# Patient Record
Sex: FEMALE | Race: WHITE | NOT HISPANIC OR LATINO | Employment: OTHER | URBAN - METROPOLITAN AREA
[De-identification: names, ages, dates, MRNs, and addresses within clinical notes are randomized per-mention and may not be internally consistent; named-entity substitution may affect disease eponyms.]

---

## 2017-04-05 ENCOUNTER — OFFICE VISIT (OUTPATIENT)
Dept: ORTHOPEDICS | Facility: CLINIC | Age: 57
End: 2017-04-05
Payer: COMMERCIAL

## 2017-04-05 VITALS
SYSTOLIC BLOOD PRESSURE: 140 MMHG | HEIGHT: 66 IN | DIASTOLIC BLOOD PRESSURE: 72 MMHG | HEART RATE: 84 BPM | WEIGHT: 180 LBS | BODY MASS INDEX: 28.93 KG/M2

## 2017-04-05 DIAGNOSIS — G89.29 CHRONIC PAIN OF LEFT KNEE: Primary | ICD-10-CM

## 2017-04-05 DIAGNOSIS — M25.562 LEFT KNEE PAIN, UNSPECIFIED CHRONICITY: Primary | ICD-10-CM

## 2017-04-05 DIAGNOSIS — M25.562 CHRONIC PAIN OF LEFT KNEE: Primary | ICD-10-CM

## 2017-04-05 PROCEDURE — 20610 DRAIN/INJ JOINT/BURSA W/O US: CPT | Mod: LT,S$GLB,, | Performed by: ORTHOPAEDIC SURGERY

## 2017-04-05 PROCEDURE — 1160F RVW MEDS BY RX/DR IN RCRD: CPT | Mod: S$GLB,,, | Performed by: ORTHOPAEDIC SURGERY

## 2017-04-05 PROCEDURE — 99213 OFFICE O/P EST LOW 20 MIN: CPT | Mod: 25,S$GLB,, | Performed by: ORTHOPAEDIC SURGERY

## 2017-04-05 RX ORDER — PRAVASTATIN SODIUM 40 MG/1
40 TABLET ORAL DAILY
COMMUNITY
End: 2018-04-09

## 2017-04-05 RX ORDER — TRIAMCINOLONE ACETONIDE 40 MG/ML
40 INJECTION, SUSPENSION INTRA-ARTICULAR; INTRAMUSCULAR
Status: DISCONTINUED | OUTPATIENT
Start: 2017-04-05 | End: 2017-04-05 | Stop reason: HOSPADM

## 2017-04-05 RX ADMIN — TRIAMCINOLONE ACETONIDE 40 MG: 40 INJECTION, SUSPENSION INTRA-ARTICULAR; INTRAMUSCULAR at 02:04

## 2017-04-05 NOTE — PROGRESS NOTES
Past Medical History:   Diagnosis Date    Arthritis     Hypertension     Thyroid disease        Past Surgical History:   Procedure Laterality Date    ANKLE SURGERY  2000    APPENDECTOMY  2015    HAND SURGERY  2012    finger     KNEE ARTHROSCOPY  2011    TONSILLECTOMY  1965       Current Outpatient Prescriptions   Medication Sig    levothyroxine (SYNTHROID) 150 MCG tablet Take 150 mcg by mouth once daily.    metoprolol succinate (TOPROL-XL) 100 MG 24 hr tablet Take 100 mg by mouth once daily.    pravastatin (PRAVACHOL) 40 MG tablet Take 40 mg by mouth once daily.    CALCIUM CARBONATE/VITAMIN D3 (VITAMIN D-3 ORAL) Take by mouth.    CITALOPRAM HYDROBROMIDE (CELEXA ORAL) Take by mouth.    DOCOSAHEXANOIC ACID/EPA (FISH OIL ORAL) Take by mouth.    FOLIC ACID ORAL Take by mouth.    PHYTONADIONE (VITAMIN K ORAL) Take by mouth.    rosuvastatin (CRESTOR) 40 MG Tab Take 10 mg by mouth every evening.    VITAMIN A ORAL Take by mouth.     No current facility-administered medications for this visit.        Review of patient's allergies indicates:  No Known Allergies    History reviewed. No pertinent family history.    Social History     Social History    Marital status:      Spouse name: N/A    Number of children: N/A    Years of education: N/A     Occupational History    Not on file.     Social History Main Topics    Smoking status: Former Smoker    Smokeless tobacco: Not on file    Alcohol use Not on file    Drug use: Not on file    Sexual activity: Not on file     Other Topics Concern    Not on file     Social History Narrative       Chief Complaint:   Chief Complaint   Patient presents with    Left Knee - Pain       Consulting Physician: No ref. provider found    History of present illness: This is a 55-year-old lady who has had knee pain on and off for the past year.  She puts her pain at a 8 out of 10.  She was scheduled for surgery but did not have  She reports popping clicking and  swelling.  She localizes her pain to the lateral aspect of the knee.  She states that is worse with activities.      Review of Systems:    Constitution: Denies chills, fever, and sweats.    HENT: Denies headaches or blurry vision.    Cardiovascular: Denies chest pain or irregular heart beat.    Respiratory: Denies cough or shortness of breath.    Gastrointestinal: Denies abdominal pain, nausea, or vomiting.    Musculoskeletal:  Denies muscle cramps.    Neurological: Denies dizziness or focal weakness.    Psychiatric/Behavioral: Normal mental status.    Hematologic/Lymphatic: Denies bleeding problem or easy bruising/bleeding.    Skin: Denies rash or suspicious lesions.      Examination:    Vital Signs:    Vitals:    04/05/17 1106   BP: (!) 140/72   Pulse: 84       Body mass index is 29.5 kg/(m^2).    This a well-developed, well nourished patient in no acute distress.    Alert and oriented and cooperative to examination.       Physical Exam: Left Knee Exam    Gait   Normal    Skin  Rash:   None  Scars:   None    Inspection  Erythema:  None  Ecchymosis:  None  Effusion:  None  Masses:  None  Lymphadenopathy: None    Range of Motion: 0 to 130° with pain    Medial Joint : Yes  Lateral Joint : Yes    Patellofemoral Tenderness: None  Patellofemoral Crepitus: None    Lachman:  Normal  Anterior Drawer: Normal  Posterior Drawer: Normal    Meghan's:  Positive  Apley's:  Positive    Varus Stress:  Stable  Valgus Stress:     Stable    Strength:  5/5    Pulses:  Palpable distal    Sensation:  Intact      Imaging: MRI shows a horizontal tear of the lateral meniscus.  It also shows an osteochondral defect underneath the anterior medial horn of the meniscus.  It appears to be a fluid-filled cyst in this area.      Assessment: Chronic pain of left knee  -     Large Joint Aspiration/Injection        Plan:  We discussed different treatment options.  She is painful in the area where she has her cyst.  She would  like to undergo a scope to do a partial lateral meniscectomy and a sub-chondroplasty to address the cyst under the cartilage on the anterior medial side.  She wants an injection today and will do surgery after her vacation.        DISCLAIMER: This note may have been dictated using voice recognition software and may contain grammatical errors.     NOTE: Consult report sent to referring provider via [employer]MR.

## 2017-04-05 NOTE — PROCEDURES
Large Joint Aspiration/Injection  Date/Time: 4/5/2017 2:30 PM  Performed by: URIEL CHRISTIE  Authorized by: URIEL CHRISTIE     Consent Done?:  Yes (Verbal)  Indications:  Pain  Timeout: Prior to procedure the correct patient, procedure, and site was verified      Location:  Knee  Site:  L knee  Prep: Patient was prepped and draped in usual sterile fashion    Approach:  Anteromedial  Medications:  40 mg triamcinolone acetonide 40 mg/mL

## 2017-05-03 ENCOUNTER — OFFICE VISIT (OUTPATIENT)
Dept: ORTHOPEDICS | Facility: CLINIC | Age: 57
End: 2017-05-03
Payer: COMMERCIAL

## 2017-05-03 VITALS
BODY MASS INDEX: 28.93 KG/M2 | HEIGHT: 66 IN | DIASTOLIC BLOOD PRESSURE: 65 MMHG | HEART RATE: 75 BPM | WEIGHT: 180 LBS | SYSTOLIC BLOOD PRESSURE: 121 MMHG

## 2017-05-03 DIAGNOSIS — S83.207D ACUTE MENISCAL TEAR OF KNEE, LEFT, SUBSEQUENT ENCOUNTER: Primary | ICD-10-CM

## 2017-05-03 PROCEDURE — 20610 DRAIN/INJ JOINT/BURSA W/O US: CPT | Mod: LT,S$GLB,, | Performed by: ORTHOPAEDIC SURGERY

## 2017-05-03 PROCEDURE — 99213 OFFICE O/P EST LOW 20 MIN: CPT | Mod: 25,S$GLB,, | Performed by: ORTHOPAEDIC SURGERY

## 2017-05-03 PROCEDURE — 1160F RVW MEDS BY RX/DR IN RCRD: CPT | Mod: S$GLB,,, | Performed by: ORTHOPAEDIC SURGERY

## 2017-05-03 RX ORDER — TRIAMCINOLONE ACETONIDE 40 MG/ML
40 INJECTION, SUSPENSION INTRA-ARTICULAR; INTRAMUSCULAR
Status: DISCONTINUED | OUTPATIENT
Start: 2017-05-03 | End: 2017-05-03 | Stop reason: HOSPADM

## 2017-05-03 RX ADMIN — TRIAMCINOLONE ACETONIDE 40 MG: 40 INJECTION, SUSPENSION INTRA-ARTICULAR; INTRAMUSCULAR at 01:05

## 2017-05-03 NOTE — PROGRESS NOTES
Past Medical History:   Diagnosis Date    Arthritis     Hypertension     Thyroid disease        Past Surgical History:   Procedure Laterality Date    ANKLE SURGERY  2000    APPENDECTOMY  2015    HAND SURGERY  2012    finger     KNEE ARTHROSCOPY  2011    TONSILLECTOMY  1965       Current Outpatient Prescriptions   Medication Sig    CALCIUM CARBONATE/VITAMIN D3 (VITAMIN D-3 ORAL) Take by mouth.    CITALOPRAM HYDROBROMIDE (CELEXA ORAL) Take by mouth.    DOCOSAHEXANOIC ACID/EPA (FISH OIL ORAL) Take by mouth.    FOLIC ACID ORAL Take by mouth.    levothyroxine (SYNTHROID) 150 MCG tablet Take 150 mcg by mouth once daily.    metoprolol succinate (TOPROL-XL) 100 MG 24 hr tablet Take 100 mg by mouth once daily.    PHYTONADIONE (VITAMIN K ORAL) Take by mouth.    pravastatin (PRAVACHOL) 40 MG tablet Take 40 mg by mouth once daily.    rosuvastatin (CRESTOR) 40 MG Tab Take 10 mg by mouth every evening.    VITAMIN A ORAL Take by mouth.     No current facility-administered medications for this visit.        Review of patient's allergies indicates:  No Known Allergies    History reviewed. No pertinent family history.    Social History     Social History    Marital status:      Spouse name: N/A    Number of children: N/A    Years of education: N/A     Occupational History    Not on file.     Social History Main Topics    Smoking status: Former Smoker    Smokeless tobacco: Not on file    Alcohol use Not on file    Drug use: Not on file    Sexual activity: Not on file     Other Topics Concern    Not on file     Social History Narrative       Chief Complaint:   Chief Complaint   Patient presents with    Left Knee - Pain       Consulting Physician: No ref. provider found    History of present illness: This is a 55-year-old lady who has had knee pain on and off for the past year.  She puts her pain at a 8 out of 10.  She was scheduled for surgery but did not have  She reports popping clicking and  swelling.  She localizes her pain to the lateral aspect of the knee.  She states that is worse with activities.      Review of Systems:    Constitution: Denies chills, fever, and sweats.    HENT: Denies headaches or blurry vision.    Cardiovascular: Denies chest pain or irregular heart beat.    Respiratory: Denies cough or shortness of breath.    Gastrointestinal: Denies abdominal pain, nausea, or vomiting.    Musculoskeletal:  Denies muscle cramps.    Neurological: Denies dizziness or focal weakness.    Psychiatric/Behavioral: Normal mental status.    Hematologic/Lymphatic: Denies bleeding problem or easy bruising/bleeding.    Skin: Denies rash or suspicious lesions.      Examination:    Vital Signs:    Vitals:    05/03/17 1102   BP: 121/65   Pulse: 75       Body mass index is 29.5 kg/(m^2).    This a well-developed, well nourished patient in no acute distress.    Alert and oriented and cooperative to examination.       Physical Exam: Left Knee Exam    Gait   Normal    Skin  Rash:   None  Scars:   None    Inspection  Erythema:  None  Ecchymosis:  None  Effusion:  None  Masses:  None  Lymphadenopathy: None    Range of Motion: 0 to 130° with pain    Medial Joint : Yes  Lateral Joint : Yes    Patellofemoral Tenderness: None  Patellofemoral Crepitus: None    Lachman:  Normal  Anterior Drawer: Normal  Posterior Drawer: Normal    Meghan's:  Positive  Apley's:  Positive    Varus Stress:  Stable  Valgus Stress:     Stable    Strength:  5/5    Pulses:  Palpable distal    Sensation:  Intact      Imaging: MRI shows a horizontal tear of the lateral meniscus.  It also shows an osteochondral defect underneath the anterior medial horn of the meniscus.  It appears to be a fluid-filled cyst in this area.      Assessment: Acute meniscal tear of knee, left, subsequent encounter  -     Large Joint Aspiration/Injection        Plan:  She wants an injection today and will do surgery after her  vacation.        DISCLAIMER: This note may have been dictated using voice recognition software and may contain grammatical errors.     NOTE: Consult report sent to referring provider via [employer] EMR.

## 2017-05-03 NOTE — PROCEDURES
Large Joint Aspiration/Injection  Date/Time: 5/3/2017 1:43 PM  Performed by: URIEL CHRISTIE  Authorized by: URIEL CHRISTIE     Consent Done?:  Yes (Verbal)  Indications:  Pain  Timeout: Prior to procedure the correct patient, procedure, and site was verified      Location:  Knee  Site:  L knee  Prep: Patient was prepped and draped in usual sterile fashion    Approach:  Anterolateral  Medications:  40 mg triamcinolone acetonide 40 mg/mL

## 2018-04-09 DIAGNOSIS — E78.5 HYPERLIPIDEMIA, UNSPECIFIED HYPERLIPIDEMIA TYPE: Primary | ICD-10-CM

## 2018-04-09 DIAGNOSIS — E03.9 HYPOTHYROIDISM, UNSPECIFIED TYPE: ICD-10-CM

## 2018-04-09 DIAGNOSIS — R00.0 TACHYCARDIA: ICD-10-CM

## 2018-04-09 RX ORDER — SIMVASTATIN 40 MG/1
40 TABLET, FILM COATED ORAL NIGHTLY
Qty: 90 TABLET | Refills: 3 | Status: SHIPPED | OUTPATIENT
Start: 2018-04-09 | End: 2018-08-06 | Stop reason: ALTCHOICE

## 2018-04-09 RX ORDER — LEVOTHYROXINE SODIUM 150 UG/1
150 TABLET ORAL DAILY
Qty: 90 TABLET | Refills: 3 | Status: SHIPPED | OUTPATIENT
Start: 2018-04-09 | End: 2018-05-08

## 2018-04-09 RX ORDER — METOPROLOL SUCCINATE 100 MG/1
100 TABLET, EXTENDED RELEASE ORAL DAILY
Qty: 90 TABLET | Refills: 3 | Status: SHIPPED | OUTPATIENT
Start: 2018-04-09 | End: 2018-05-08 | Stop reason: SDUPTHER

## 2018-05-08 DIAGNOSIS — R00.0 TACHYCARDIA: ICD-10-CM

## 2018-05-08 RX ORDER — METOPROLOL SUCCINATE 100 MG/1
100 TABLET, EXTENDED RELEASE ORAL DAILY
Qty: 90 TABLET | Refills: 3 | Status: SHIPPED | OUTPATIENT
Start: 2018-05-08 | End: 2019-07-15 | Stop reason: SDUPTHER

## 2018-05-08 RX ORDER — LEVOTHYROXINE SODIUM 137 UG/1
137 TABLET ORAL
Qty: 30 TABLET | Refills: 11 | Status: SHIPPED | OUTPATIENT
Start: 2018-05-08 | End: 2018-11-06 | Stop reason: DRUGHIGH

## 2018-06-15 DIAGNOSIS — I10 HYPERTENSION, UNSPECIFIED TYPE: Primary | ICD-10-CM

## 2018-06-15 RX ORDER — LOSARTAN POTASSIUM 50 MG/1
50 TABLET ORAL DAILY
Qty: 90 TABLET | Refills: 3 | Status: SHIPPED | OUTPATIENT
Start: 2018-06-15 | End: 2018-08-06

## 2018-07-16 ENCOUNTER — OFFICE VISIT (OUTPATIENT)
Dept: FAMILY MEDICINE | Facility: CLINIC | Age: 58
End: 2018-07-16
Payer: COMMERCIAL

## 2018-07-16 ENCOUNTER — HOSPITAL ENCOUNTER (EMERGENCY)
Facility: HOSPITAL | Age: 58
Discharge: HOME | End: 2018-07-16
Attending: EMERGENCY MEDICINE
Payer: COMMERCIAL

## 2018-07-16 VITALS
HEIGHT: 65 IN | HEART RATE: 86 BPM | SYSTOLIC BLOOD PRESSURE: 156 MMHG | RESPIRATION RATE: 18 BRPM | DIASTOLIC BLOOD PRESSURE: 100 MMHG | BODY MASS INDEX: 32.99 KG/M2 | OXYGEN SATURATION: 98 % | TEMPERATURE: 98 F | WEIGHT: 198 LBS

## 2018-07-16 VITALS
SYSTOLIC BLOOD PRESSURE: 180 MMHG | HEIGHT: 65 IN | BODY MASS INDEX: 32.99 KG/M2 | WEIGHT: 198 LBS | TEMPERATURE: 98 F | DIASTOLIC BLOOD PRESSURE: 98 MMHG | OXYGEN SATURATION: 100 % | HEART RATE: 100 BPM

## 2018-07-16 DIAGNOSIS — I10 ESSENTIAL HYPERTENSION: Primary | ICD-10-CM

## 2018-07-16 DIAGNOSIS — T78.40XA ALLERGIC REACTION, INITIAL ENCOUNTER: Primary | ICD-10-CM

## 2018-07-16 DIAGNOSIS — I10 ESSENTIAL HYPERTENSION: ICD-10-CM

## 2018-07-16 LAB
ALBUMIN SERPL BCP-MCNC: 4.6 G/DL
ALP SERPL-CCNC: 84 U/L
ALT SERPL W/O P-5'-P-CCNC: 23 U/L
ANION GAP SERPL CALC-SCNC: 12 MMOL/L
AST SERPL-CCNC: 20 U/L
BASOPHILS # BLD AUTO: 0.04 K/UL
BASOPHILS NFR BLD: 0.4 %
BILIRUB SERPL-MCNC: 0.4 MG/DL
BUN SERPL-MCNC: 29 MG/DL
CALCIUM SERPL-MCNC: 9.8 MG/DL
CHLORIDE SERPL-SCNC: 100 MMOL/L
CO2 SERPL-SCNC: 23 MMOL/L
CREAT SERPL-MCNC: 0.9 MG/DL
CRP SERPL-MCNC: 0.19 MG/DL
DIFFERENTIAL METHOD: ABNORMAL
EOSINOPHIL # BLD AUTO: 0.3 K/UL
EOSINOPHIL NFR BLD: 2.8 %
ERYTHROCYTE [DISTWIDTH] IN BLOOD BY AUTOMATED COUNT: 13.3 %
EST. GFR  (AFRICAN AMERICAN): >60 ML/MIN/1.73 M^2
EST. GFR  (NON AFRICAN AMERICAN): >60 ML/MIN/1.73 M^2
GLUCOSE SERPL-MCNC: 94 MG/DL
HCT VFR BLD AUTO: 40.6 %
HGB BLD-MCNC: 13.6 G/DL
IMM GRANULOCYTES # BLD AUTO: 0.06 K/UL
IMM GRANULOCYTES NFR BLD AUTO: 0.6 %
LYMPHOCYTES # BLD AUTO: 2.7 K/UL
LYMPHOCYTES NFR BLD: 29.1 %
MCH RBC QN AUTO: 30.4 PG
MCHC RBC AUTO-ENTMCNC: 33.5 G/DL
MCV RBC AUTO: 91 FL
MONOCYTES # BLD AUTO: 0.8 K/UL
MONOCYTES NFR BLD: 8.8 %
NEUTROPHILS # BLD AUTO: 5.5 K/UL
NEUTROPHILS NFR BLD: 58.3 %
NRBC BLD-RTO: 0 /100 WBC
PLATELET # BLD AUTO: 241 K/UL
PMV BLD AUTO: 9.7 FL
POTASSIUM SERPL-SCNC: 3.9 MMOL/L
PROT SERPL-MCNC: 7.7 G/DL
RBC # BLD AUTO: 4.47 M/UL
SODIUM SERPL-SCNC: 135 MMOL/L
WBC # BLD AUTO: 9.4 K/UL

## 2018-07-16 PROCEDURE — 3008F BODY MASS INDEX DOCD: CPT | Mod: S$GLB,ICN,, | Performed by: NURSE PRACTITIONER

## 2018-07-16 PROCEDURE — 86140 C-REACTIVE PROTEIN: CPT

## 2018-07-16 PROCEDURE — 80053 COMPREHEN METABOLIC PANEL: CPT

## 2018-07-16 PROCEDURE — 99213 OFFICE O/P EST LOW 20 MIN: CPT | Mod: S$GLB,ICN,, | Performed by: NURSE PRACTITIONER

## 2018-07-16 PROCEDURE — 85025 COMPLETE CBC W/AUTO DIFF WBC: CPT

## 2018-07-16 PROCEDURE — 25000003 PHARM REV CODE 250: Performed by: EMERGENCY MEDICINE

## 2018-07-16 PROCEDURE — 99283 EMERGENCY DEPT VISIT LOW MDM: CPT | Mod: 25

## 2018-07-16 PROCEDURE — 96374 THER/PROPH/DIAG INJ IV PUSH: CPT

## 2018-07-16 PROCEDURE — 3077F SYST BP >= 140 MM HG: CPT | Mod: S$GLB,ICN,, | Performed by: NURSE PRACTITIONER

## 2018-07-16 PROCEDURE — 63600175 PHARM REV CODE 636 W HCPCS: Performed by: EMERGENCY MEDICINE

## 2018-07-16 PROCEDURE — 3080F DIAST BP >= 90 MM HG: CPT | Mod: S$GLB,ICN,, | Performed by: NURSE PRACTITIONER

## 2018-07-16 RX ORDER — PREDNISONE 10 MG/1
30 TABLET ORAL DAILY
Qty: 9 TABLET | Refills: 0 | Status: SHIPPED | OUTPATIENT
Start: 2018-07-16 | End: 2018-07-19

## 2018-07-16 RX ORDER — DEXAMETHASONE SODIUM PHOSPHATE 10 MG/ML
INJECTION INTRAMUSCULAR; INTRAVENOUS
Status: DISCONTINUED
Start: 2018-07-16 | End: 2018-07-16 | Stop reason: HOSPADM

## 2018-07-16 RX ORDER — CLONIDINE HYDROCHLORIDE 0.1 MG/1
0.1 TABLET ORAL
Status: COMPLETED | OUTPATIENT
Start: 2018-07-16 | End: 2018-07-16

## 2018-07-16 RX ORDER — CLONIDINE HYDROCHLORIDE 0.1 MG/1
0.1 TABLET ORAL EVERY 8 HOURS PRN
Qty: 30 TABLET | Refills: 0 | Status: SHIPPED | OUTPATIENT
Start: 2018-07-16 | End: 2019-05-24

## 2018-07-16 RX ORDER — DEXAMETHASONE SODIUM PHOSPHATE 100 MG/10ML
10 INJECTION INTRAMUSCULAR; INTRAVENOUS
Status: COMPLETED | OUTPATIENT
Start: 2018-07-16 | End: 2018-07-16

## 2018-07-16 RX ADMIN — DEXAMETHASONE SODIUM PHOSPHATE 10 MG: 10 INJECTION, SOLUTION INTRAMUSCULAR; INTRAVENOUS at 11:07

## 2018-07-16 RX ADMIN — CLONIDINE HYDROCHLORIDE 0.1 MG: 0.1 TABLET ORAL at 12:07

## 2018-07-16 NOTE — DISCHARGE INSTRUCTIONS
Prednisone 3 d    Clonidine as needed    OTC Benadryl    ER if worsens     K Sharmila BRAR    [phone]

## 2018-07-18 NOTE — ED PROVIDER NOTES
Encounter Date: 7/16/2018       History     Chief Complaint   Patient presents with    Allergic Reaction     Started Thursday, was given a steroid injection by Dr. Mckenzie Haynes     58 y F with complaints of facial rash with redness and mild edema.  Symptoms again and passed a she underwent a steroid injection for treatment of symptoms and had mild improvement and then her re-emergence of the rash.    No known inflammatory state  No new medication  No known allergies to food or medications          Review of patient's allergies indicates:  No Known Allergies  Past Medical History:   Diagnosis Date    Arthritis     Hypertension     Thyroid disease      Past Surgical History:   Procedure Laterality Date    ANKLE SURGERY  2000    APPENDECTOMY  2015    HAND SURGERY  2012    finger     HYSTERECTOMY      KNEE ARTHROSCOPY  2011    TONSILLECTOMY  1965     History reviewed. No pertinent family history.  Social History   Substance Use Topics    Smoking status: Former Smoker    Smokeless tobacco: Not on file    Alcohol use Not on file     Review of Systems   Constitutional: Negative.    HENT: Positive for facial swelling. Negative for drooling and trouble swallowing.    Eyes: Negative for photophobia, pain, discharge, redness, itching and visual disturbance.   Respiratory: Negative for cough and shortness of breath.    Cardiovascular: Negative for chest pain.   Musculoskeletal: Negative.    Skin: Positive for rash.   Hematological: Negative.    All other systems reviewed and are negative.      Physical Exam     Initial Vitals [07/16/18 1031]   BP Pulse Resp Temp SpO2   (!) 160/100 79 20 97.9 °F (36.6 °C) 98 %      MAP       --         Physical Exam    Nursing note and vitals reviewed.  Constitutional: She appears well-developed and well-nourished. She is not diaphoretic. No distress.   HENT:   Head: Normocephalic and atraumatic.       Nose: Nose normal.   Mouth/Throat: Oropharynx is clear and moist. No  oropharyngeal exudate.   Eyes: Conjunctivae and EOM are normal. Pupils are equal, round, and reactive to light. Right eye exhibits no discharge. Left eye exhibits no discharge. No scleral icterus.   Neck: Normal range of motion. Neck supple.   Cardiovascular: Normal rate, regular rhythm, normal heart sounds and intact distal pulses. Exam reveals no gallop and no friction rub.    No murmur heard.  Pulmonary/Chest: Breath sounds normal. No respiratory distress. She has no wheezes. She has no rhonchi. She has no rales.   Abdominal: Soft. Bowel sounds are normal. She exhibits no distension and no mass. There is no tenderness. There is no rebound and no guarding.   Musculoskeletal: Normal range of motion. She exhibits no edema or tenderness.   Lymphadenopathy:     She has no cervical adenopathy.   Neurological: She is alert and oriented to person, place, and time. She has normal strength. No cranial nerve deficit or sensory deficit.   Skin: Skin is warm and dry. Capillary refill takes less than 2 seconds. Rash noted. There is erythema. No pallor.   Psychiatric: She has a normal mood and affect. Her behavior is normal. Judgment and thought content normal.         ED Course   Procedures  Labs Reviewed   CBC W/ AUTO DIFFERENTIAL - Abnormal; Notable for the following:        Result Value    Immature Granulocytes 0.6 (*)     Immature Grans (Abs) 0.06 (*)     All other components within normal limits   COMPREHENSIVE METABOLIC PANEL - Abnormal; Notable for the following:     Sodium 135 (*)     BUN, Bld 29 (*)     All other components within normal limits   C-REACTIVE PROTEIN          Imaging Results    None          Medical Decision Making:   Prednisone 3 d    Clonidine as needed    OTC Benadryl    ER if worsens                       Clinical Impression:   The primary encounter diagnosis was Allergic reaction, initial encounter. A diagnosis of Essential hypertension was also pertinent to this visit.                              Gabe Doll MD  07/18/18 5097

## 2018-07-19 RX ORDER — AMLODIPINE BESYLATE 5 MG/1
5 TABLET ORAL DAILY
Qty: 90 TABLET | Refills: 11 | Status: SHIPPED | OUTPATIENT
Start: 2018-07-19 | End: 2019-07-15 | Stop reason: SDUPTHER

## 2018-07-25 NOTE — PROGRESS NOTES
Chief Complaint  Chief Complaint   Patient presents with    Rash       HPI  Patrica Donato is a 58 y.o. female with medical diagnoses as listed within the medical history and problem list that presents for 3-4 day onset of facial redness and rash. Intense itching and burning. She was previously treated with IM injection of Celestone. She notes no significant improvement. She now feels that her tongue is tingling and swelling. Mouth is dry and having trouble swallowing.     PAST MEDICAL HISTORY:  Past Medical History:   Diagnosis Date    Arthritis     Hypertension     Thyroid disease        PAST SURGICAL HISTORY:  Past Surgical History:   Procedure Laterality Date    ANKLE SURGERY  2000    APPENDECTOMY  2015    HAND SURGERY  2012    finger     HYSTERECTOMY      KNEE ARTHROSCOPY  2011    TONSILLECTOMY  1965       SOCIAL HISTORY:  Social History     Social History    Marital status:      Spouse name: N/A    Number of children: N/A    Years of education: N/A     Occupational History    Not on file.     Social History Main Topics    Smoking status: Former Smoker    Smokeless tobacco: Not on file    Alcohol use Not on file    Drug use: No    Sexual activity: Not on file     Other Topics Concern    Not on file     Social History Narrative    No narrative on file       FAMILY HISTORY:  No family history on file.    ALLERGIES AND MEDICATIONS: updated and reviewed.  Review of patient's allergies indicates:  No Known Allergies  Current Outpatient Prescriptions   Medication Sig Dispense Refill    amLODIPine (NORVASC) 5 MG tablet Take 1 tablet (5 mg total) by mouth once daily. 90 tablet 11    CALCIUM CARBONATE/VITAMIN D3 (VITAMIN D-3 ORAL) Take by mouth.      CITALOPRAM HYDROBROMIDE (CELEXA ORAL) Take by mouth.      cloNIDine (CATAPRES) 0.1 MG tablet Take 1 tablet (0.1 mg total) by mouth every 8 (eight) hours as needed. 30 tablet 0    DOCOSAHEXANOIC ACID/EPA (FISH OIL ORAL) Take by mouth.    "   FOLIC ACID ORAL Take by mouth.      levothyroxine (SYNTHROID) 137 MCG Tab tablet Take 1 tablet (137 mcg total) by mouth before breakfast. 30 tablet 11    losartan (COZAAR) 50 MG tablet Take 1 tablet (50 mg total) by mouth once daily. 90 tablet 3    metoprolol succinate (TOPROL-XL) 100 MG 24 hr tablet Take 1 tablet (100 mg total) by mouth once daily. 90 tablet 3    PHYTONADIONE (VITAMIN K ORAL) Take by mouth.      simvastatin (ZOCOR) 40 MG tablet Take 1 tablet (40 mg total) by mouth every evening. 90 tablet 3    VITAMIN A ORAL Take by mouth.       No current facility-administered medications for this visit.          ROS  Review of Systems   Constitutional: Negative for chills and fever.   Respiratory: Negative for cough, shortness of breath and wheezing.    Cardiovascular: Negative for chest pain, palpitations and leg swelling.   Gastrointestinal: Negative for diarrhea, nausea and vomiting.   Musculoskeletal: Negative for back pain.   Skin: Positive for rash.   Neurological: Negative for dizziness and syncope.           PHYSICAL EXAM  Vitals:    07/16/18 1055   BP: (!) 180/98   BP Location: Left arm   Patient Position: Sitting   BP Method: Large (Automatic)   Pulse: 100   Temp: 98.3 °F (36.8 °C)   TempSrc: Tympanic   SpO2: 100%   Weight: 89.8 kg (198 lb)   Height: 5' 5" (1.651 m)    Body mass index is 32.95 kg/m².  Weight: 89.8 kg (198 lb)   Height: 5' 5" (165.1 cm)       Physical Exam   Constitutional: She is oriented to person, place, and time. She appears well-developed and well-nourished.   HENT:   Head: Normocephalic and atraumatic.   Eyes: EOM are normal. Pupils are equal, round, and reactive to light.   Neck: Normal range of motion. Neck supple.   Cardiovascular: Regular rhythm and normal heart sounds.  Tachycardia present.    No murmur heard.  Pulmonary/Chest: Effort normal and breath sounds normal.   Abdominal: Soft. There is no tenderness.   Musculoskeletal: Normal range of motion. "   Lymphadenopathy:     She has no cervical adenopathy.   Neurological: She is alert and oriented to person, place, and time.   Skin: Skin is warm. Rash noted. There is erythema.   Psychiatric: She has a normal mood and affect. Her behavior is normal. Thought content normal.         Health Maintenance       Date Due Completion Date    Hepatitis C Screening 1960 ---    Lipid Panel 1960 ---    TETANUS VACCINE 03/15/1977 ---    Mammogram 03/15/1999 ---    Colonoscopy 03/15/2009 ---    Influenza Vaccine 08/01/2018 ---               Assessment & Plan    Patrica was seen today for rash.    Diagnoses and all orders for this visit:    Essential hypertension  -     amLODIPine (NORVASC) 5 MG tablet; Take 1 tablet (5 mg total) by mouth once daily.      Sent to ER.     Follow-up: No Follow-up on file.

## 2018-08-06 DIAGNOSIS — E78.5 HYPERLIPIDEMIA, UNSPECIFIED HYPERLIPIDEMIA TYPE: Primary | ICD-10-CM

## 2018-08-06 RX ORDER — PRAVASTATIN SODIUM 20 MG/1
20 TABLET ORAL DAILY
Qty: 90 TABLET | Refills: 3 | Status: SHIPPED | OUTPATIENT
Start: 2018-08-06 | End: 2019-09-10 | Stop reason: SDUPTHER

## 2018-09-21 DIAGNOSIS — R10.13 ACUTE EPIGASTRIC PAIN: Primary | ICD-10-CM

## 2018-09-24 ENCOUNTER — HOSPITAL ENCOUNTER (OUTPATIENT)
Dept: RADIOLOGY | Facility: HOSPITAL | Age: 58
Discharge: HOME | End: 2018-09-24
Attending: SURGERY
Payer: COMMERCIAL

## 2018-09-24 DIAGNOSIS — R10.11 RUQ PAIN: Primary | ICD-10-CM

## 2018-09-24 DIAGNOSIS — R10.13 ACUTE EPIGASTRIC PAIN: ICD-10-CM

## 2018-09-24 PROCEDURE — 76705 ECHO EXAM OF ABDOMEN: CPT | Mod: TC

## 2018-09-24 PROCEDURE — 76705 ECHO EXAM OF ABDOMEN: CPT | Mod: 26,,, | Performed by: RADIOLOGY

## 2018-09-25 ENCOUNTER — HOSPITAL ENCOUNTER (OUTPATIENT)
Dept: RADIOLOGY | Facility: HOSPITAL | Age: 58
Discharge: HOME | End: 2018-09-25
Attending: SURGERY
Payer: COMMERCIAL

## 2018-09-25 DIAGNOSIS — K81.1 CHRONIC CHOLECYSTITIS: Primary | ICD-10-CM

## 2018-09-25 DIAGNOSIS — R10.11 RUQ PAIN: ICD-10-CM

## 2018-09-25 PROCEDURE — A9537 TC99M MEBROFENIN: HCPCS

## 2018-09-25 PROCEDURE — 78227 HEPATOBIL SYST IMAGE W/DRUG: CPT | Mod: 26,,, | Performed by: RADIOLOGY

## 2018-10-01 ENCOUNTER — OFFICE VISIT (OUTPATIENT)
Dept: SURGERY | Facility: CLINIC | Age: 58
End: 2018-10-01
Payer: COMMERCIAL

## 2018-10-01 VITALS
SYSTOLIC BLOOD PRESSURE: 166 MMHG | HEIGHT: 66 IN | WEIGHT: 185 LBS | DIASTOLIC BLOOD PRESSURE: 88 MMHG | OXYGEN SATURATION: 96 % | BODY MASS INDEX: 29.73 KG/M2 | HEART RATE: 82 BPM | TEMPERATURE: 97 F

## 2018-10-01 DIAGNOSIS — K81.1 CHRONIC CHOLECYSTITIS: Primary | ICD-10-CM

## 2018-10-01 PROCEDURE — 3008F BODY MASS INDEX DOCD: CPT | Mod: S$GLB,ICN,, | Performed by: SURGERY

## 2018-10-01 PROCEDURE — 3077F SYST BP >= 140 MM HG: CPT | Mod: S$GLB,ICN,, | Performed by: SURGERY

## 2018-10-01 PROCEDURE — 3079F DIAST BP 80-89 MM HG: CPT | Mod: S$GLB,ICN,, | Performed by: SURGERY

## 2018-10-01 PROCEDURE — 99203 OFFICE O/P NEW LOW 30 MIN: CPT | Mod: S$GLB,ICN,, | Performed by: SURGERY

## 2018-10-01 RX ORDER — LIDOCAINE HYDROCHLORIDE 10 MG/ML
1 INJECTION, SOLUTION EPIDURAL; INFILTRATION; INTRACAUDAL; PERINEURAL ONCE
Status: CANCELLED | OUTPATIENT
Start: 2018-10-01 | End: 2018-10-01

## 2018-10-01 RX ORDER — OXYCODONE AND ACETAMINOPHEN 10; 325 MG/1; MG/1
1 TABLET ORAL EVERY 4 HOURS PRN
Qty: 40 TABLET | Refills: 0 | Status: SHIPPED | OUTPATIENT
Start: 2018-10-01 | End: 2019-05-24

## 2018-10-01 RX ORDER — ACETAMINOPHEN 10 MG/ML
1000 INJECTION, SOLUTION INTRAVENOUS
Status: CANCELLED | OUTPATIENT
Start: 2018-10-01 | End: 2018-10-02

## 2018-10-01 RX ORDER — SODIUM CHLORIDE, SODIUM LACTATE, POTASSIUM CHLORIDE, CALCIUM CHLORIDE 600; 310; 30; 20 MG/100ML; MG/100ML; MG/100ML; MG/100ML
INJECTION, SOLUTION INTRAVENOUS CONTINUOUS
Status: CANCELLED | OUTPATIENT
Start: 2018-10-01

## 2018-10-01 RX ORDER — ONDANSETRON 4 MG/1
4 TABLET, FILM COATED ORAL EVERY 6 HOURS PRN
Qty: 30 TABLET | Refills: 0 | Status: SHIPPED | OUTPATIENT
Start: 2018-10-01 | End: 2019-05-24

## 2018-10-01 NOTE — H&P (VIEW-ONLY)
[facility] - General Surgery  General Surgery  H&P      Patient Name: Patrica Donato  MRN: [id number]     Chief Complaint: Consult and Gall Bladder Problem     HPI:   Ms. Donato presents today with complaints of right upper quadrant abdominal pain. She has had numerous episodes of similar pain over the past several weeks. Episodes are increasing in frequency and severity. This current episode began approximately 3 hr ago after eating chicken and dumplings for lunch. Pain is associated with nausea but no vomiting. No fevers, chills, jaundice, biliuria, acholia, diarrhea, constipation, melena, hematochezia, hematemesis, etc. No other associated symptoms. No other aggravating factors. No alleviating factors. Evaluation thus far has included a gallbladder ultrasound and a HIDA scan.   Gallbladder ultrasound films and report reviewed from 9/24/2018. No evidence of cholelithiasis, cholecystitis, choledocholithiasis, or dilated bile ducts. There is evidence of fatty infiltration involving the liver and pancreas. There is a hypoechoic nodule in the right lobe of the liver. HIDA scan films and report were reviewed from 9/25/2018. Gallbladder ejection fraction was calculated at 18%.     Allergies:   No Known Allergies       Current Medications          Medication Sig Dispense Refill    amLODIPine (NORVASC) 5 MG tablet Take 1 tablet (5 mg total) by mouth once daily. 90 tablet 11    CALCIUM CARBONATE/VITAMIN D3 (VITAMIN D-3 ORAL) Take by mouth.      cloNIDine (CATAPRES) 0.1 MG tablet Take 1 tablet (0.1 mg total) by mouth every 8 (eight) hours as needed. 30 tablet 0    levothyroxine (SYNTHROID) 137 MCG Tab tablet Take 1 tablet (137 mcg total) by mouth before breakfast. 30 tablet 11    metoprolol succinate (TOPROL-XL) 100 MG 24 hr tablet Take 1 tablet (100 mg total) by mouth once daily. 90 tablet 3    PHYTONADIONE (VITAMIN K ORAL) Take by mouth.      pravastatin (PRAVACHOL) 20 MG tablet Take 1 tablet  (20 mg total) by mouth once daily. 90 tablet 3    VITAMIN A ORAL Take by mouth.      ondansetron (ZOFRAN) 4 MG tablet Take 1 tablet (4 mg total) by mouth every 6 (six) hours as needed for Nausea. 30 tablet 0    oxyCODONE-acetaminophen (PERCOCET)  mg per tablet Take 1 tablet by mouth every 4 (four) hours as needed for Pain. 40 tablet 0         PMFSHx:        Past Medical History:   Diagnosis Date    Arthritis     Hypertension     Thyroid disease            Past Surgical History:   Procedure Laterality Date    ANKLE SURGERY  2000    APPENDECTOMY  2015    HAND SURGERY  2012    finger     HYSTERECTOMY      KNEE ARTHROSCOPY  2011    TONSILLECTOMY  1965     History reviewed. No pertinent family history.     Social History          Tobacco Use    Smoking status: Former Smoker   Substance Use Topics    Alcohol use: Not on file    Drug use: No     Review of Systems   Constitutional: Negative for appetite change, chills, fatigue, fever and unexpected weight change.   HENT: Negative for congestion, dental problem, ear pain, mouth sores, postnasal drip, rhinorrhea, sore throat, tinnitus, trouble swallowing and voice change.   Eyes: Negative for photophobia, pain, discharge and visual disturbance.   Respiratory: Negative for cough, chest tightness, shortness of breath and wheezing.   Cardiovascular: Negative for chest pain, palpitations and leg swelling.   Gastrointestinal: Negative for abdominal pain, blood in stool, constipation, diarrhea, nausea and vomiting.   Endocrine: Negative for cold intolerance, heat intolerance, polydipsia, polyphagia and polyuria.   Genitourinary: Negative for difficulty urinating, dysuria, flank pain, frequency, hematuria and urgency.   Musculoskeletal: Negative for arthralgias, joint swelling and myalgias.   Skin: Negative for color change and rash.   Allergic/Immunologic: Negative for immunocompromised state.   Neurological: Negative for dizziness, tremors, seizures, syncope,  speech difficulty, weakness, numbness and headaches.   Hematological: Negative for adenopathy. Does not bruise/bleed easily.   Psychiatric/Behavioral: Negative for agitation, confusion, hallucinations, self-injury and suicidal ideas. The patient is not nervous/anxious.       Physical Exam   Constitutional: She is oriented to person, place, and time. She appears well-developed and well-nourished. She is active. Non-toxic appearance. No distress.   HENT:   Head: Normocephalic and atraumatic.   Right Ear: Hearing and external ear normal. No drainage or tenderness.   Left Ear: Hearing and external ear normal. No drainage or tenderness.   Nose: Nose normal. No rhinorrhea. No epistaxis.   Mouth/Throat: Uvula is midline, oropharynx is clear and moist and mucous membranes are normal. Mucous membranes are not pale, not dry and not cyanotic. No oropharyngeal exudate.   Eyes: Conjunctivae and lids are normal. Pupils are equal, round, and reactive to light. Right eye exhibits no discharge and no exudate. Left eye exhibits no discharge and no exudate. Right conjunctiva is not injected. Right eye exhibits no nystagmus. Left eye exhibits no nystagmus.   Neck: Trachea normal, full passive range of motion without pain and phonation normal. No JVD present. Carotid bruit is not present. No tracheal deviation present. No thyroid mass and no thyromegaly present.   Cardiovascular: Normal rate, regular rhythm, S1 normal, S2 normal, normal heart sounds and intact distal pulses. PMI is not displaced. Exam reveals no gallop and no friction rub.   No murmur heard.   Pulmonary/Chest: Effort normal and breath sounds normal. No accessory muscle usage. No respiratory distress. She exhibits no mass, no tenderness and no crepitus. Right breast exhibits no inverted nipple, no mass, no nipple discharge, no skin change and no tenderness. Left breast exhibits no inverted nipple, no mass, no nipple discharge, no skin change and no tenderness. Breasts  are symmetrical.   Abdominal: Soft. Normal appearance and bowel sounds are normal. She exhibits no distension, no fluid wave, no abdominal bruit and no mass. There is no hepatosplenomegaly. There is no tenderness. There is no rebound, no guarding and negative Rust's sign. No hernia. Hernia confirmed negative in the right inguinal area and confirmed negative in the left inguinal area.   Genitourinary: Rectum normal and vagina normal. There is no rash or lesion on the right labia. There is no rash or lesion on the left labia. Right adnexum displays no mass. Left adnexum displays no mass. No vaginal discharge found.   Musculoskeletal:   Cervical back: Normal.   Thoracic back: Normal.   Lumbar back: Normal.   Right upper arm: Normal.   Left upper arm: Normal.   Right forearm: Normal.   Left forearm: Normal.   Right hand: Normal.   Left hand: Normal.   Right upper leg: Normal.   Left upper leg: Normal.   Right lower leg: Normal.   Left lower leg: Normal.   Right foot: Normal.   Left foot: Normal.   Lymphadenopathy:   Head (right side): No submental, no submandibular and no posterior auricular adenopathy present.   Head (left side): No submental, no submandibular and no posterior auricular adenopathy present.   She has no cervical adenopathy.   She has no axillary adenopathy.   Right: No inguinal and no supraclavicular adenopathy present.   Left: No inguinal and no supraclavicular adenopathy present.   Neurological: She is alert and oriented to person, place, and time. She has normal strength. No cranial nerve deficit or sensory deficit. Coordination and gait normal. GCS eye subscore is 4. GCS verbal subscore is 5. GCS motor subscore is 6.   Skin: Skin is warm, dry and intact. No rash noted. No cyanosis. Nails show no clubbing.   Psychiatric: She has a normal mood and affect. Her speech is normal. Judgment and thought content normal. Her mood appears not anxious. Her affect is not inappropriate. She is not actively  hallucinating. She does not exhibit a depressed mood.     Test Results: Pending       Assessment:     Postprandial right upper quadrant abdominal pain. Abnormal HIDA scan. Differential diagnosis includes but is not limited to cholecystitis, gallbladder dyskinesia, ampullary dysfunction, cholelithiasis, ulcer disease, gastritis, IBS, IBD, etc. Options include but not limited to laparoscopic cholecystectomy, laparotomy and cholecystectomy, endoscopy, radiologic studies, second opinion, observation, medical therapy, nonoperative therapy, symptomatic treatment, etc.       1. Chronic cholecystitis      Plan:   Chronic cholecystitis   - Case Request Operating Room: CHOLECYSTECTOMY-LAPAROSCOPIC   - Full code; Standing   - Place in Outpatient; Standing   - Vital signs; Standing   - Insert peripheral IV; Standing   - Verify beta-blocker dose taken within 24 hours if patient is prescribed beta-blocker; Standing   - Height and weight; Standing   - Verify discontinuation of antithrombotics; Standing   - Verify blood consent; Standing   - Verify consent; Standing   - Void on call to Operating Room; Standing   - Diet NPO; Standing   - Place FIGUEROA hose; Standing   - Place sequential compression device; Standing   - Comprehensive metabolic panel; Future; Expected date: 10/01/2018   - Amylase; Future; Expected date: 10/01/2018   - Lipase; Future; Expected date: 10/01/2018   - CBC auto differential; Future; Expected date: 10/01/2018   - EKG 12-lead; Future; Expected date: 10/01/2018   Other orders   - oxyCODONE-acetaminophen (PERCOCET)  mg per tablet; Take 1 tablet by mouth every 4 (four) hours as needed for Pain. Dispense: 40 tablet; Refill: 0   - ondansetron (ZOFRAN) 4 MG tablet; Take 1 tablet (4 mg total) by mouth every 6 (six) hours as needed for Nausea. Dispense: 30 tablet; Refill: 0   - lidocaine (PF) 10 mg/ml (1%) injection 10 mg; Inject 1 mL (10 mg total) into the skin once.   - lactated ringers infusion; Inject into the  vein continuous.   - IP VTE LOW RISK PATIENT; Standing   - acetaminophen (10 mg/mL) injection 1,000 mg; Inject 100 mLs (1,000 mg total) into the vein OR 1 Time.     Follow-up in about 2 weeks (around 10/15/2018) for routine postop evaluation.       Counseling/Medical Decision Making: Ms. Donato was counseled regarding the results of the evaluation thus far and the differential diagnosis including but not limited to: cholelithiasis, cholecystitis, choledocholithiasis, gastritis, duodenitis, ulcer disease, reflux disease, Sphincter of Oddi Dysfunction, pancreatitis, irritable bowel syndrome, inflammatory bowel disease, diverticular disease, neoplastic disease, infectious disease, ischemic disease, etc. We also discussed all diagnostic and therapeutic options as well as the risks, benefits, possible complications, details of, and indications for each option. Options discussed included but were not limited to: endoscopy, laparoscopic cholecystectomy, conventional cholecystectomy, radiologic studies, second opinion, observation, empiric therapy, symptomatic therapy, stone dissolving therapy, lithotripsy, referral elsewhere for treatment, etc. Possible complications of laparoscopic surgery discussed included but were not limited to: bleeding, infections, scars, chronic pain, nerve damage, internal injuries, bile duct injuries, inability to complete the operation laparoscopically / need to convert to an open technique, retained stones, persistent symptoms, need for additional operations or procedures, chronic diarrhea, weight gain, weight loss, etc. Questions were solicited and answered. [employer] publications were provided regarding endoscopy, gallbladder disease, laparoscopic surgery, etc. I read the entire consent form to her verbatim. A copy of the consent form was provided for her review outside the office and hospital prior to the procedure. Entire conversation was held in laymans terms. All unfamiliar terms defined.  At the conclusion of the conversation she voiced complete understanding of all we discussed, satisfaction that all questions were answered, and that she felt fully informed and completely capable of making an informed decision. She requests and desires to proceed with an attempted laparoscopic cholecystectomy.

## 2018-10-01 NOTE — PROGRESS NOTES
Subjective:       Patient ID: Patrica Donato is a 58 y.o. female.    Chief Complaint: Consult and Gall Bladder Problem      HPI:   Ms. Donato presents today with complaints of right upper quadrant abdominal pain. She has had numerous episodes of similar pain over the past several weeks.  Episodes are increasing in frequency and severity.  This current episode began approximately 3 hr ago after eating chicken and dumplings for lunch.  Pain is associated with nausea but no vomiting.  No fevers, chills, jaundice, biliuria, acholia, diarrhea, constipation, melena, hematochezia, hematemesis, etc.  No other associated symptoms.  No other aggravating factors.  No alleviating factors.  Evaluation thus far has included a gallbladder ultrasound and a HIDA scan.    Gallbladder ultrasound films and report reviewed from 9/24/2018.  No evidence of cholelithiasis, cholecystitis, choledocholithiasis, or dilated bile ducts.  There is evidence of fatty infiltration involving the liver and pancreas.  There is a hypoechoic nodule in the right lobe of the liver. HIDA scan films and report were reviewed from 9/25/2018.  Gallbladder ejection fraction was calculated at 18%.        Allergies & Meds:  Review of patient's allergies indicates:  No Known Allergies    Current Outpatient Medications   Medication Sig Dispense Refill    amLODIPine (NORVASC) 5 MG tablet Take 1 tablet (5 mg total) by mouth once daily. 90 tablet 11    CALCIUM CARBONATE/VITAMIN D3 (VITAMIN D-3 ORAL) Take by mouth.      cloNIDine (CATAPRES) 0.1 MG tablet Take 1 tablet (0.1 mg total) by mouth every 8 (eight) hours as needed. 30 tablet 0    levothyroxine (SYNTHROID) 137 MCG Tab tablet Take 1 tablet (137 mcg total) by mouth before breakfast. 30 tablet 11    metoprolol succinate (TOPROL-XL) 100 MG 24 hr tablet Take 1 tablet (100 mg total) by mouth once daily. 90 tablet 3    PHYTONADIONE (VITAMIN K ORAL) Take by mouth.      pravastatin (PRAVACHOL) 20 MG tablet Take  1 tablet (20 mg total) by mouth once daily. 90 tablet 3    VITAMIN A ORAL Take by mouth.      ondansetron (ZOFRAN) 4 MG tablet Take 1 tablet (4 mg total) by mouth every 6 (six) hours as needed for Nausea. 30 tablet 0    oxyCODONE-acetaminophen (PERCOCET)  mg per tablet Take 1 tablet by mouth every 4 (four) hours as needed for Pain. 40 tablet 0     No current facility-administered medications for this visit.        PMFSHx:  Past Medical History:   Diagnosis Date    Arthritis     Hypertension     Thyroid disease        Past Surgical History:   Procedure Laterality Date    ANKLE SURGERY  2000    APPENDECTOMY  2015    HAND SURGERY  2012    finger     HYSTERECTOMY      KNEE ARTHROSCOPY  2011    TONSILLECTOMY  1965       History reviewed. No pertinent family history.    Social History     Tobacco Use    Smoking status: Former Smoker   Substance Use Topics    Alcohol use: Not on file    Drug use: No       Review of Systems   Constitutional: Negative for appetite change, chills, fatigue, fever and unexpected weight change.   HENT: Negative for congestion, dental problem, ear pain, mouth sores, postnasal drip, rhinorrhea, sore throat, tinnitus, trouble swallowing and voice change.    Eyes: Negative for photophobia, pain, discharge and visual disturbance.   Respiratory: Negative for cough, chest tightness, shortness of breath and wheezing.    Cardiovascular: Negative for chest pain, palpitations and leg swelling.   Gastrointestinal: Negative for abdominal pain, blood in stool, constipation, diarrhea, nausea and vomiting.   Endocrine: Negative for cold intolerance, heat intolerance, polydipsia, polyphagia and polyuria.   Genitourinary: Negative for difficulty urinating, dysuria, flank pain, frequency, hematuria and urgency.   Musculoskeletal: Negative for arthralgias, joint swelling and myalgias.   Skin: Negative for color change and rash.   Allergic/Immunologic: Negative for immunocompromised state.    Neurological: Negative for dizziness, tremors, seizures, syncope, speech difficulty, weakness, numbness and headaches.   Hematological: Negative for adenopathy. Does not bruise/bleed easily.   Psychiatric/Behavioral: Negative for agitation, confusion, hallucinations, self-injury and suicidal ideas. The patient is not nervous/anxious.        Objective:      Physical Exam   Constitutional: She is oriented to person, place, and time. She appears well-developed and well-nourished. She is active.  Non-toxic appearance. No distress.   HENT:   Head: Normocephalic and atraumatic.   Right Ear: Hearing and external ear normal. No drainage or tenderness.   Left Ear: Hearing and external ear normal. No drainage or tenderness.   Nose: Nose normal. No rhinorrhea. No epistaxis.   Mouth/Throat: Uvula is midline, oropharynx is clear and moist and mucous membranes are normal. Mucous membranes are not pale, not dry and not cyanotic. No oropharyngeal exudate.   Eyes: Conjunctivae and lids are normal. Pupils are equal, round, and reactive to light. Right eye exhibits no discharge and no exudate. Left eye exhibits no discharge and no exudate. Right conjunctiva is not injected. Right eye exhibits no nystagmus. Left eye exhibits no nystagmus.   Neck: Trachea normal, full passive range of motion without pain and phonation normal. No JVD present. Carotid bruit is not present. No tracheal deviation present. No thyroid mass and no thyromegaly present.   Cardiovascular: Normal rate, regular rhythm, S1 normal, S2 normal, normal heart sounds and intact distal pulses. PMI is not displaced. Exam reveals no gallop and no friction rub.   No murmur heard.  Pulmonary/Chest: Effort normal and breath sounds normal. No accessory muscle usage. No respiratory distress. She exhibits no mass, no tenderness and no crepitus. Right breast exhibits no inverted nipple, no mass, no nipple discharge, no skin change and no tenderness. Left breast exhibits no  inverted nipple, no mass, no nipple discharge, no skin change and no tenderness. Breasts are symmetrical.   Abdominal: Soft. Normal appearance and bowel sounds are normal. She exhibits no distension, no fluid wave, no abdominal bruit and no mass. There is no hepatosplenomegaly. There is no tenderness. There is no rebound, no guarding and negative Rust's sign. No hernia. Hernia confirmed negative in the right inguinal area and confirmed negative in the left inguinal area.   Genitourinary: Rectum normal and vagina normal. There is no rash or lesion on the right labia. There is no rash or lesion on the left labia. Right adnexum displays no mass. Left adnexum displays no mass. No vaginal discharge found.   Musculoskeletal:        Cervical back: Normal.        Thoracic back: Normal.        Lumbar back: Normal.        Right upper arm: Normal.        Left upper arm: Normal.        Right forearm: Normal.        Left forearm: Normal.        Right hand: Normal.        Left hand: Normal.        Right upper leg: Normal.        Left upper leg: Normal.        Right lower leg: Normal.        Left lower leg: Normal.        Right foot: Normal.        Left foot: Normal.   Lymphadenopathy:        Head (right side): No submental, no submandibular and no posterior auricular adenopathy present.        Head (left side): No submental, no submandibular and no posterior auricular adenopathy present.     She has no cervical adenopathy.     She has no axillary adenopathy.        Right: No inguinal and no supraclavicular adenopathy present.        Left: No inguinal and no supraclavicular adenopathy present.   Neurological: She is alert and oriented to person, place, and time. She has normal strength. No cranial nerve deficit or sensory deficit. Coordination and gait normal. GCS eye subscore is 4. GCS verbal subscore is 5. GCS motor subscore is 6.   Skin: Skin is warm, dry and intact. No rash noted. No cyanosis. Nails show no clubbing.    Psychiatric: She has a normal mood and affect. Her speech is normal. Judgment and thought content normal. Her mood appears not anxious. Her affect is not inappropriate. She is not actively hallucinating. She does not exhibit a depressed mood.         Test Results:   Pending    Assessment:       Postprandial right upper quadrant abdominal pain.  Abnormal HIDA scan.  Differential diagnosis includes but is not limited to cholecystitis, gallbladder dyskinesia, ampullary dysfunction, cholelithiasis, ulcer disease, gastritis, IBS, IBD, etc.  Options include but not limited to laparoscopic cholecystectomy, laparotomy and cholecystectomy, endoscopy, radiologic studies, second opinion, observation, medical therapy, nonoperative therapy, symptomatic treatment, etc.      1. Chronic cholecystitis        Plan:   Chronic cholecystitis  -     Case Request Operating Room: CHOLECYSTECTOMY-LAPAROSCOPIC  -     Full code; Standing  -     Place in Outpatient; Standing  -     Vital signs; Standing  -     Insert peripheral IV; Standing  -     Verify beta-blocker dose taken within 24 hours if patient is prescribed beta-blocker; Standing  -     Height and weight; Standing  -     Verify discontinuation of antithrombotics; Standing  -     Verify blood consent; Standing  -     Verify consent; Standing  -     Void on call to Operating Room; Standing  -     Diet NPO; Standing  -     Place FIGUEROA hose; Standing  -     Place sequential compression device; Standing  -     Comprehensive metabolic panel; Future; Expected date: 10/01/2018  -     Amylase; Future; Expected date: 10/01/2018  -     Lipase; Future; Expected date: 10/01/2018  -     CBC auto differential; Future; Expected date: 10/01/2018  -     EKG 12-lead; Future; Expected date: 10/01/2018    Other orders  -     oxyCODONE-acetaminophen (PERCOCET)  mg per tablet; Take 1 tablet by mouth every 4 (four) hours as needed for Pain.  Dispense: 40 tablet; Refill: 0  -     ondansetron (ZOFRAN)  4 MG tablet; Take 1 tablet (4 mg total) by mouth every 6 (six) hours as needed for Nausea.  Dispense: 30 tablet; Refill: 0  -     lidocaine (PF) 10 mg/ml (1%) injection 10 mg; Inject 1 mL (10 mg total) into the skin once.  -     lactated ringers infusion; Inject into the vein continuous.  -     IP VTE LOW RISK PATIENT; Standing  -     acetaminophen (10 mg/mL) injection 1,000 mg; Inject 100 mLs (1,000 mg total) into the vein OR 1 Time.        Follow-up in about 2 weeks (around 10/15/2018) for routine postop evaluation.    Counseling/Medical Decision Making:  Ms. Donato was counseled regarding the results of the evaluation thus far and the differential diagnosis including but not limited to: cholelithiasis, cholecystitis, choledocholithiasis, gastritis, duodenitis, ulcer disease, reflux disease, Sphincter of Oddi Dysfunction, pancreatitis, irritable bowel syndrome, inflammatory bowel disease, diverticular disease, neoplastic disease, infectious disease, ischemic disease, etc.  We also discussed all diagnostic and therapeutic options as well as the risks, benefits, possible complications, details of, and indications for each option.  Options discussed included but were not limited to: endoscopy, laparoscopic cholecystectomy, conventional cholecystectomy, radiologic studies, second opinion, observation, empiric therapy, symptomatic therapy, stone dissolving therapy, lithotripsy, referral elsewhere for treatment, etc.  Possible complications of laparoscopic surgery discussed included but were not limited to: bleeding, infections, scars, chronic pain, nerve damage, internal injuries, bile duct injuries, inability to complete the operation laparoscopically / need to convert to an open technique, retained stones, persistent symptoms, need for additional operations or procedures, chronic diarrhea, weight gain, weight loss, etc.  Questions were solicited and answered.  [employer] publications were provided regarding endoscopy,  gallbladder disease, laparoscopic surgery, etc.  I read the entire consent form to her verbatim. A copy of the consent form was provided for her review outside the office and hospital prior to the procedure.  Entire conversation was held in laymans terms.  All unfamiliar terms defined.  At the conclusion of the conversation she voiced complete understanding of all we discussed, satisfaction that all questions were answered, and that she felt fully informed and completely capable of making an informed decision.  She requests and desires to proceed with an attempted laparoscopic cholecystectomy.

## 2018-10-01 NOTE — H&P
[facility] - General Surgery  General Surgery  H&P      Patient Name: Patrica Donato  MRN: [id number]     Chief Complaint: Consult and Gall Bladder Problem     HPI:   Ms. Donato presents today with complaints of right upper quadrant abdominal pain. She has had numerous episodes of similar pain over the past several weeks. Episodes are increasing in frequency and severity. This current episode began approximately 3 hr ago after eating chicken and dumplings for lunch. Pain is associated with nausea but no vomiting. No fevers, chills, jaundice, biliuria, acholia, diarrhea, constipation, melena, hematochezia, hematemesis, etc. No other associated symptoms. No other aggravating factors. No alleviating factors. Evaluation thus far has included a gallbladder ultrasound and a HIDA scan.   Gallbladder ultrasound films and report reviewed from 9/24/2018. No evidence of cholelithiasis, cholecystitis, choledocholithiasis, or dilated bile ducts. There is evidence of fatty infiltration involving the liver and pancreas. There is a hypoechoic nodule in the right lobe of the liver. HIDA scan films and report were reviewed from 9/25/2018. Gallbladder ejection fraction was calculated at 18%.     Allergies:   No Known Allergies       Current Medications          Medication Sig Dispense Refill    amLODIPine (NORVASC) 5 MG tablet Take 1 tablet (5 mg total) by mouth once daily. 90 tablet 11    CALCIUM CARBONATE/VITAMIN D3 (VITAMIN D-3 ORAL) Take by mouth.      cloNIDine (CATAPRES) 0.1 MG tablet Take 1 tablet (0.1 mg total) by mouth every 8 (eight) hours as needed. 30 tablet 0    levothyroxine (SYNTHROID) 137 MCG Tab tablet Take 1 tablet (137 mcg total) by mouth before breakfast. 30 tablet 11    metoprolol succinate (TOPROL-XL) 100 MG 24 hr tablet Take 1 tablet (100 mg total) by mouth once daily. 90 tablet 3    PHYTONADIONE (VITAMIN K ORAL) Take by mouth.      pravastatin (PRAVACHOL) 20 MG tablet Take 1 tablet  (20 mg total) by mouth once daily. 90 tablet 3    VITAMIN A ORAL Take by mouth.      ondansetron (ZOFRAN) 4 MG tablet Take 1 tablet (4 mg total) by mouth every 6 (six) hours as needed for Nausea. 30 tablet 0    oxyCODONE-acetaminophen (PERCOCET)  mg per tablet Take 1 tablet by mouth every 4 (four) hours as needed for Pain. 40 tablet 0         PMFSHx:        Past Medical History:   Diagnosis Date    Arthritis     Hypertension     Thyroid disease            Past Surgical History:   Procedure Laterality Date    ANKLE SURGERY  2000    APPENDECTOMY  2015    HAND SURGERY  2012    finger     HYSTERECTOMY      KNEE ARTHROSCOPY  2011    TONSILLECTOMY  1965     History reviewed. No pertinent family history.     Social History          Tobacco Use    Smoking status: Former Smoker   Substance Use Topics    Alcohol use: Not on file    Drug use: No     Review of Systems   Constitutional: Negative for appetite change, chills, fatigue, fever and unexpected weight change.   HENT: Negative for congestion, dental problem, ear pain, mouth sores, postnasal drip, rhinorrhea, sore throat, tinnitus, trouble swallowing and voice change.   Eyes: Negative for photophobia, pain, discharge and visual disturbance.   Respiratory: Negative for cough, chest tightness, shortness of breath and wheezing.   Cardiovascular: Negative for chest pain, palpitations and leg swelling.   Gastrointestinal: Negative for abdominal pain, blood in stool, constipation, diarrhea, nausea and vomiting.   Endocrine: Negative for cold intolerance, heat intolerance, polydipsia, polyphagia and polyuria.   Genitourinary: Negative for difficulty urinating, dysuria, flank pain, frequency, hematuria and urgency.   Musculoskeletal: Negative for arthralgias, joint swelling and myalgias.   Skin: Negative for color change and rash.   Allergic/Immunologic: Negative for immunocompromised state.   Neurological: Negative for dizziness, tremors, seizures, syncope,  speech difficulty, weakness, numbness and headaches.   Hematological: Negative for adenopathy. Does not bruise/bleed easily.   Psychiatric/Behavioral: Negative for agitation, confusion, hallucinations, self-injury and suicidal ideas. The patient is not nervous/anxious.       Physical Exam   Constitutional: She is oriented to person, place, and time. She appears well-developed and well-nourished. She is active. Non-toxic appearance. No distress.   HENT:   Head: Normocephalic and atraumatic.   Right Ear: Hearing and external ear normal. No drainage or tenderness.   Left Ear: Hearing and external ear normal. No drainage or tenderness.   Nose: Nose normal. No rhinorrhea. No epistaxis.   Mouth/Throat: Uvula is midline, oropharynx is clear and moist and mucous membranes are normal. Mucous membranes are not pale, not dry and not cyanotic. No oropharyngeal exudate.   Eyes: Conjunctivae and lids are normal. Pupils are equal, round, and reactive to light. Right eye exhibits no discharge and no exudate. Left eye exhibits no discharge and no exudate. Right conjunctiva is not injected. Right eye exhibits no nystagmus. Left eye exhibits no nystagmus.   Neck: Trachea normal, full passive range of motion without pain and phonation normal. No JVD present. Carotid bruit is not present. No tracheal deviation present. No thyroid mass and no thyromegaly present.   Cardiovascular: Normal rate, regular rhythm, S1 normal, S2 normal, normal heart sounds and intact distal pulses. PMI is not displaced. Exam reveals no gallop and no friction rub.   No murmur heard.   Pulmonary/Chest: Effort normal and breath sounds normal. No accessory muscle usage. No respiratory distress. She exhibits no mass, no tenderness and no crepitus. Right breast exhibits no inverted nipple, no mass, no nipple discharge, no skin change and no tenderness. Left breast exhibits no inverted nipple, no mass, no nipple discharge, no skin change and no tenderness. Breasts  are symmetrical.   Abdominal: Soft. Normal appearance and bowel sounds are normal. She exhibits no distension, no fluid wave, no abdominal bruit and no mass. There is no hepatosplenomegaly. There is no tenderness. There is no rebound, no guarding and negative Rust's sign. No hernia. Hernia confirmed negative in the right inguinal area and confirmed negative in the left inguinal area.   Genitourinary: Rectum normal and vagina normal. There is no rash or lesion on the right labia. There is no rash or lesion on the left labia. Right adnexum displays no mass. Left adnexum displays no mass. No vaginal discharge found.   Musculoskeletal:   Cervical back: Normal.   Thoracic back: Normal.   Lumbar back: Normal.   Right upper arm: Normal.   Left upper arm: Normal.   Right forearm: Normal.   Left forearm: Normal.   Right hand: Normal.   Left hand: Normal.   Right upper leg: Normal.   Left upper leg: Normal.   Right lower leg: Normal.   Left lower leg: Normal.   Right foot: Normal.   Left foot: Normal.   Lymphadenopathy:   Head (right side): No submental, no submandibular and no posterior auricular adenopathy present.   Head (left side): No submental, no submandibular and no posterior auricular adenopathy present.   She has no cervical adenopathy.   She has no axillary adenopathy.   Right: No inguinal and no supraclavicular adenopathy present.   Left: No inguinal and no supraclavicular adenopathy present.   Neurological: She is alert and oriented to person, place, and time. She has normal strength. No cranial nerve deficit or sensory deficit. Coordination and gait normal. GCS eye subscore is 4. GCS verbal subscore is 5. GCS motor subscore is 6.   Skin: Skin is warm, dry and intact. No rash noted. No cyanosis. Nails show no clubbing.   Psychiatric: She has a normal mood and affect. Her speech is normal. Judgment and thought content normal. Her mood appears not anxious. Her affect is not inappropriate. She is not actively  hallucinating. She does not exhibit a depressed mood.     Test Results: Pending       Assessment:     Postprandial right upper quadrant abdominal pain. Abnormal HIDA scan. Differential diagnosis includes but is not limited to cholecystitis, gallbladder dyskinesia, ampullary dysfunction, cholelithiasis, ulcer disease, gastritis, IBS, IBD, etc. Options include but not limited to laparoscopic cholecystectomy, laparotomy and cholecystectomy, endoscopy, radiologic studies, second opinion, observation, medical therapy, nonoperative therapy, symptomatic treatment, etc.       1. Chronic cholecystitis      Plan:   Chronic cholecystitis   - Case Request Operating Room: CHOLECYSTECTOMY-LAPAROSCOPIC   - Full code; Standing   - Place in Outpatient; Standing   - Vital signs; Standing   - Insert peripheral IV; Standing   - Verify beta-blocker dose taken within 24 hours if patient is prescribed beta-blocker; Standing   - Height and weight; Standing   - Verify discontinuation of antithrombotics; Standing   - Verify blood consent; Standing   - Verify consent; Standing   - Void on call to Operating Room; Standing   - Diet NPO; Standing   - Place FIGUEROA hose; Standing   - Place sequential compression device; Standing   - Comprehensive metabolic panel; Future; Expected date: 10/01/2018   - Amylase; Future; Expected date: 10/01/2018   - Lipase; Future; Expected date: 10/01/2018   - CBC auto differential; Future; Expected date: 10/01/2018   - EKG 12-lead; Future; Expected date: 10/01/2018   Other orders   - oxyCODONE-acetaminophen (PERCOCET)  mg per tablet; Take 1 tablet by mouth every 4 (four) hours as needed for Pain. Dispense: 40 tablet; Refill: 0   - ondansetron (ZOFRAN) 4 MG tablet; Take 1 tablet (4 mg total) by mouth every 6 (six) hours as needed for Nausea. Dispense: 30 tablet; Refill: 0   - lidocaine (PF) 10 mg/ml (1%) injection 10 mg; Inject 1 mL (10 mg total) into the skin once.   - lactated ringers infusion; Inject into the  vein continuous.   - IP VTE LOW RISK PATIENT; Standing   - acetaminophen (10 mg/mL) injection 1,000 mg; Inject 100 mLs (1,000 mg total) into the vein OR 1 Time.     Follow-up in about 2 weeks (around 10/15/2018) for routine postop evaluation.       Counseling/Medical Decision Making: Ms. Donato was counseled regarding the results of the evaluation thus far and the differential diagnosis including but not limited to: cholelithiasis, cholecystitis, choledocholithiasis, gastritis, duodenitis, ulcer disease, reflux disease, Sphincter of Oddi Dysfunction, pancreatitis, irritable bowel syndrome, inflammatory bowel disease, diverticular disease, neoplastic disease, infectious disease, ischemic disease, etc. We also discussed all diagnostic and therapeutic options as well as the risks, benefits, possible complications, details of, and indications for each option. Options discussed included but were not limited to: endoscopy, laparoscopic cholecystectomy, conventional cholecystectomy, radiologic studies, second opinion, observation, empiric therapy, symptomatic therapy, stone dissolving therapy, lithotripsy, referral elsewhere for treatment, etc. Possible complications of laparoscopic surgery discussed included but were not limited to: bleeding, infections, scars, chronic pain, nerve damage, internal injuries, bile duct injuries, inability to complete the operation laparoscopically / need to convert to an open technique, retained stones, persistent symptoms, need for additional operations or procedures, chronic diarrhea, weight gain, weight loss, etc. Questions were solicited and answered. [employer] publications were provided regarding endoscopy, gallbladder disease, laparoscopic surgery, etc. I read the entire consent form to her verbatim. A copy of the consent form was provided for her review outside the office and hospital prior to the procedure. Entire conversation was held in laymans terms. All unfamiliar terms defined.  At the conclusion of the conversation she voiced complete understanding of all we discussed, satisfaction that all questions were answered, and that she felt fully informed and completely capable of making an informed decision. She requests and desires to proceed with an attempted laparoscopic cholecystectomy.

## 2018-10-02 ENCOUNTER — ANESTHESIA EVENT (OUTPATIENT)
Dept: SURGERY | Facility: HOSPITAL | Age: 58
End: 2018-10-02
Payer: COMMERCIAL

## 2018-10-02 ENCOUNTER — HOSPITAL ENCOUNTER (OUTPATIENT)
Facility: HOSPITAL | Age: 58
Discharge: HOME | End: 2018-10-02
Attending: SURGERY | Admitting: SURGERY
Payer: COMMERCIAL

## 2018-10-02 ENCOUNTER — ANESTHESIA (OUTPATIENT)
Dept: SURGERY | Facility: HOSPITAL | Age: 58
End: 2018-10-02
Payer: COMMERCIAL

## 2018-10-02 VITALS
TEMPERATURE: 99 F | RESPIRATION RATE: 10 BRPM | SYSTOLIC BLOOD PRESSURE: 129 MMHG | DIASTOLIC BLOOD PRESSURE: 63 MMHG | HEIGHT: 66 IN | OXYGEN SATURATION: 95 % | WEIGHT: 185 LBS | HEART RATE: 76 BPM | BODY MASS INDEX: 29.73 KG/M2

## 2018-10-02 DIAGNOSIS — E03.9 ACQUIRED HYPOTHYROIDISM: Primary | ICD-10-CM

## 2018-10-02 DIAGNOSIS — Z01.818 PREOP TESTING: ICD-10-CM

## 2018-10-02 DIAGNOSIS — K81.1 CHRONIC CHOLECYSTITIS: ICD-10-CM

## 2018-10-02 LAB
ALBUMIN SERPL BCP-MCNC: 4.5 G/DL
ALP SERPL-CCNC: 76 U/L
ALT SERPL W/O P-5'-P-CCNC: 30 U/L
AMYLASE SERPL-CCNC: 30 U/L
ANION GAP SERPL CALC-SCNC: 9 MMOL/L
AST SERPL-CCNC: 28 U/L
BASOPHILS # BLD AUTO: 0.01 K/UL
BASOPHILS NFR BLD: 0.2 %
BILIRUB SERPL-MCNC: 0.6 MG/DL
BUN SERPL-MCNC: 23 MG/DL
CALCIUM SERPL-MCNC: 9.2 MG/DL
CHLORIDE SERPL-SCNC: 100 MMOL/L
CO2 SERPL-SCNC: 27 MMOL/L
CREAT SERPL-MCNC: 0.7 MG/DL
DIFFERENTIAL METHOD: NORMAL
EOSINOPHIL # BLD AUTO: 0.1 K/UL
EOSINOPHIL NFR BLD: 2.2 %
ERYTHROCYTE [DISTWIDTH] IN BLOOD BY AUTOMATED COUNT: 12.7 %
EST. GFR  (AFRICAN AMERICAN): >60 ML/MIN/1.73 M^2
EST. GFR  (NON AFRICAN AMERICAN): >60 ML/MIN/1.73 M^2
GLUCOSE SERPL-MCNC: 105 MG/DL
HCT VFR BLD AUTO: 38.7 %
HGB BLD-MCNC: 12.7 G/DL
IMM GRANULOCYTES # BLD AUTO: 0.02 K/UL
IMM GRANULOCYTES NFR BLD AUTO: 0.4 %
LIPASE SERPL-CCNC: 22 U/L
LYMPHOCYTES # BLD AUTO: 1.5 K/UL
LYMPHOCYTES NFR BLD: 26.6 %
MCH RBC QN AUTO: 30.4 PG
MCHC RBC AUTO-ENTMCNC: 32.8 G/DL
MCV RBC AUTO: 93 FL
MONOCYTES # BLD AUTO: 0.3 K/UL
MONOCYTES NFR BLD: 6.1 %
NEUTROPHILS # BLD AUTO: 3.6 K/UL
NEUTROPHILS NFR BLD: 64.5 %
NRBC BLD-RTO: 0 /100 WBC
PLATELET # BLD AUTO: 198 K/UL
PMV BLD AUTO: 9.5 FL
POTASSIUM SERPL-SCNC: 4.1 MMOL/L
PROT SERPL-MCNC: 7.6 G/DL
RBC # BLD AUTO: 4.18 M/UL
SODIUM SERPL-SCNC: 136 MMOL/L
T3FREE SERPL-MCNC: 2.3 PG/ML
T4 FREE SERPL-MCNC: 0.71 NG/DL
TSH SERPL DL<=0.005 MIU/L-ACNC: 6.35 UIU/ML
WBC # BLD AUTO: 5.57 K/UL

## 2018-10-02 PROCEDURE — 80053 COMPREHEN METABOLIC PANEL: CPT

## 2018-10-02 PROCEDURE — 71000039 HC RECOVERY, EACH ADD'L HOUR: Performed by: SURGERY

## 2018-10-02 PROCEDURE — 37000009 HC ANESTHESIA EA ADD 15 MINS: Performed by: SURGERY

## 2018-10-02 PROCEDURE — 71000033 HC RECOVERY, INTIAL HOUR: Performed by: SURGERY

## 2018-10-02 PROCEDURE — 84481 FREE ASSAY (FT-3): CPT

## 2018-10-02 PROCEDURE — 85025 COMPLETE CBC W/AUTO DIFF WBC: CPT

## 2018-10-02 PROCEDURE — 36000708 HC OR TIME LEV III 1ST 15 MIN: Performed by: SURGERY

## 2018-10-02 PROCEDURE — 88304 TISSUE EXAM BY PATHOLOGIST: CPT | Performed by: PATHOLOGY

## 2018-10-02 PROCEDURE — 71000015 HC POSTOP RECOV 1ST HR: Performed by: SURGERY

## 2018-10-02 PROCEDURE — 36000709 HC OR TIME LEV III EA ADD 15 MIN: Performed by: SURGERY

## 2018-10-02 PROCEDURE — 27201423 OPTIME MED/SURG SUP & DEVICES STERILE SUPPLY: Performed by: SURGERY

## 2018-10-02 PROCEDURE — D9220A PRA ANESTHESIA: Mod: CRNA,,, | Performed by: NURSE ANESTHETIST, CERTIFIED REGISTERED

## 2018-10-02 PROCEDURE — 47562 LAPAROSCOPIC CHOLECYSTECTOMY: CPT | Mod: ,,, | Performed by: SURGERY

## 2018-10-02 PROCEDURE — 84439 ASSAY OF FREE THYROXINE: CPT

## 2018-10-02 PROCEDURE — 63600175 PHARM REV CODE 636 W HCPCS: Performed by: SURGERY

## 2018-10-02 PROCEDURE — 25000003 PHARM REV CODE 250: Performed by: SURGERY

## 2018-10-02 PROCEDURE — 83690 ASSAY OF LIPASE: CPT

## 2018-10-02 PROCEDURE — 25000003 PHARM REV CODE 250: Performed by: NURSE ANESTHETIST, CERTIFIED REGISTERED

## 2018-10-02 PROCEDURE — 36415 COLL VENOUS BLD VENIPUNCTURE: CPT

## 2018-10-02 PROCEDURE — 82150 ASSAY OF AMYLASE: CPT

## 2018-10-02 PROCEDURE — D9220A PRA ANESTHESIA: Mod: ANES,,, | Performed by: ANESTHESIOLOGY

## 2018-10-02 PROCEDURE — 84443 ASSAY THYROID STIM HORMONE: CPT

## 2018-10-02 PROCEDURE — 88304 TISSUE EXAM BY PATHOLOGIST: CPT | Mod: 26,,, | Performed by: PATHOLOGY

## 2018-10-02 PROCEDURE — 63600175 PHARM REV CODE 636 W HCPCS: Performed by: NURSE ANESTHETIST, CERTIFIED REGISTERED

## 2018-10-02 PROCEDURE — 37000008 HC ANESTHESIA 1ST 15 MINUTES: Performed by: SURGERY

## 2018-10-02 PROCEDURE — 63600175 PHARM REV CODE 636 W HCPCS: Performed by: ANESTHESIOLOGY

## 2018-10-02 RX ORDER — MIDAZOLAM HYDROCHLORIDE 1 MG/ML
INJECTION INTRAMUSCULAR; INTRAVENOUS
Status: DISCONTINUED
Start: 2018-10-02 | End: 2018-10-02 | Stop reason: HOSPADM

## 2018-10-02 RX ORDER — KETOROLAC TROMETHAMINE 30 MG/ML
15 INJECTION, SOLUTION INTRAMUSCULAR; INTRAVENOUS ONCE
Status: DISCONTINUED | OUTPATIENT
Start: 2018-10-02 | End: 2018-10-02 | Stop reason: HOSPADM

## 2018-10-02 RX ORDER — KETOROLAC TROMETHAMINE 30 MG/ML
INJECTION, SOLUTION INTRAMUSCULAR; INTRAVENOUS
Status: DISCONTINUED | OUTPATIENT
Start: 2018-10-02 | End: 2018-10-02

## 2018-10-02 RX ORDER — ONDANSETRON 2 MG/ML
INJECTION INTRAMUSCULAR; INTRAVENOUS
Status: DISCONTINUED | OUTPATIENT
Start: 2018-10-02 | End: 2018-10-02

## 2018-10-02 RX ORDER — ONDANSETRON 2 MG/ML
4 INJECTION INTRAMUSCULAR; INTRAVENOUS DAILY PRN
Status: DISCONTINUED | OUTPATIENT
Start: 2018-10-02 | End: 2018-10-02 | Stop reason: HOSPADM

## 2018-10-02 RX ORDER — GLYCOPYRROLATE 0.2 MG/ML
INJECTION INTRAMUSCULAR; INTRAVENOUS
Status: DISCONTINUED | OUTPATIENT
Start: 2018-10-02 | End: 2018-10-02

## 2018-10-02 RX ORDER — MIDAZOLAM HYDROCHLORIDE 1 MG/ML
2 INJECTION INTRAMUSCULAR; INTRAVENOUS ONCE
Status: DISCONTINUED | OUTPATIENT
Start: 2018-10-02 | End: 2018-10-02 | Stop reason: HOSPADM

## 2018-10-02 RX ORDER — SODIUM CHLORIDE, SODIUM LACTATE, POTASSIUM CHLORIDE, CALCIUM CHLORIDE 600; 310; 30; 20 MG/100ML; MG/100ML; MG/100ML; MG/100ML
INJECTION, SOLUTION INTRAVENOUS CONTINUOUS
Status: DISCONTINUED | OUTPATIENT
Start: 2018-10-02 | End: 2018-10-02 | Stop reason: HOSPADM

## 2018-10-02 RX ORDER — PROPOFOL 10 MG/ML
VIAL (ML) INTRAVENOUS
Status: DISCONTINUED | OUTPATIENT
Start: 2018-10-02 | End: 2018-10-02

## 2018-10-02 RX ORDER — ACETAMINOPHEN 10 MG/ML
1000 INJECTION, SOLUTION INTRAVENOUS
Status: COMPLETED | OUTPATIENT
Start: 2018-10-02 | End: 2018-10-02

## 2018-10-02 RX ORDER — SODIUM CHLORIDE, SODIUM LACTATE, POTASSIUM CHLORIDE, CALCIUM CHLORIDE 600; 310; 30; 20 MG/100ML; MG/100ML; MG/100ML; MG/100ML
125 INJECTION, SOLUTION INTRAVENOUS CONTINUOUS
Status: DISCONTINUED | OUTPATIENT
Start: 2018-10-02 | End: 2018-10-02 | Stop reason: HOSPADM

## 2018-10-02 RX ORDER — ERTAPENEM 1 G/1
INJECTION, POWDER, LYOPHILIZED, FOR SOLUTION INTRAMUSCULAR; INTRAVENOUS
Status: COMPLETED
Start: 2018-10-02 | End: 2018-10-02

## 2018-10-02 RX ORDER — LIDOCAINE HYDROCHLORIDE 10 MG/ML
1 INJECTION, SOLUTION EPIDURAL; INFILTRATION; INTRACAUDAL; PERINEURAL ONCE
Status: DISCONTINUED | OUTPATIENT
Start: 2018-10-02 | End: 2018-10-02 | Stop reason: HOSPADM

## 2018-10-02 RX ORDER — BUPIVACAINE HYDROCHLORIDE AND EPINEPHRINE 5; 5 MG/ML; UG/ML
INJECTION, SOLUTION EPIDURAL; INTRACAUDAL; PERINEURAL
Status: DISCONTINUED | OUTPATIENT
Start: 2018-10-02 | End: 2018-10-02 | Stop reason: HOSPADM

## 2018-10-02 RX ORDER — SUCCINYLCHOLINE CHLORIDE 20 MG/ML
INJECTION INTRAMUSCULAR; INTRAVENOUS
Status: DISCONTINUED | OUTPATIENT
Start: 2018-10-02 | End: 2018-10-02

## 2018-10-02 RX ORDER — MORPHINE SULFATE 4 MG/ML
2 INJECTION, SOLUTION INTRAMUSCULAR; INTRAVENOUS EVERY 5 MIN PRN
Status: DISCONTINUED | OUTPATIENT
Start: 2018-10-02 | End: 2018-10-02 | Stop reason: HOSPADM

## 2018-10-02 RX ORDER — DIPHENHYDRAMINE HYDROCHLORIDE 50 MG/ML
12.5 INJECTION INTRAMUSCULAR; INTRAVENOUS
Status: DISCONTINUED | OUTPATIENT
Start: 2018-10-02 | End: 2018-10-02 | Stop reason: HOSPADM

## 2018-10-02 RX ORDER — CISATRACURIUM BESYLATE 2 MG/ML
INJECTION, SOLUTION INTRAVENOUS
Status: DISCONTINUED | OUTPATIENT
Start: 2018-10-02 | End: 2018-10-02

## 2018-10-02 RX ORDER — MEPERIDINE HYDROCHLORIDE 50 MG/ML
INJECTION INTRAMUSCULAR; INTRAVENOUS; SUBCUTANEOUS
Status: DISCONTINUED | OUTPATIENT
Start: 2018-10-02 | End: 2018-10-02

## 2018-10-02 RX ORDER — METOCLOPRAMIDE HYDROCHLORIDE 5 MG/ML
10 INJECTION INTRAMUSCULAR; INTRAVENOUS ONCE
Status: COMPLETED | OUTPATIENT
Start: 2018-10-02 | End: 2018-10-02

## 2018-10-02 RX ADMIN — GLYCOPYRROLATE 0.4 MG: 0.2 INJECTION INTRAMUSCULAR; INTRAVENOUS at 12:10

## 2018-10-02 RX ADMIN — ACETAMINOPHEN 1000 MG: 10 INJECTION, SOLUTION INTRAVENOUS at 01:10

## 2018-10-02 RX ADMIN — CISATRACURIUM BESYLATE 3 MG: 2 INJECTION INTRAVENOUS at 12:10

## 2018-10-02 RX ADMIN — KETOROLAC TROMETHAMINE 30 MG: 30 INJECTION, SOLUTION INTRAMUSCULAR at 01:10

## 2018-10-02 RX ADMIN — SODIUM CHLORIDE, POTASSIUM CHLORIDE, SODIUM LACTATE AND CALCIUM CHLORIDE: 600; 310; 30; 20 INJECTION, SOLUTION INTRAVENOUS at 11:10

## 2018-10-02 RX ADMIN — METOCLOPRAMIDE HYDROCHLORIDE 10 MG: 10 INJECTION, SOLUTION INTRAMUSCULAR; INTRAVENOUS at 11:10

## 2018-10-02 RX ADMIN — PROPOFOL 70 MG: 10 INJECTION, EMULSION INTRAVENOUS at 01:10

## 2018-10-02 RX ADMIN — ONDANSETRON 4 MG: 2 INJECTION INTRAMUSCULAR; INTRAVENOUS at 01:10

## 2018-10-02 RX ADMIN — MEPERIDINE HYDROCHLORIDE 25 MG: 50 INJECTION INTRAMUSCULAR; INTRAVENOUS; SUBCUTANEOUS at 12:10

## 2018-10-02 RX ADMIN — ERTAPENEM SODIUM 1 G: 1 INJECTION, POWDER, LYOPHILIZED, FOR SOLUTION INTRAMUSCULAR; INTRAVENOUS at 12:10

## 2018-10-02 RX ADMIN — PROPOFOL 130 MG: 10 INJECTION, EMULSION INTRAVENOUS at 12:10

## 2018-10-02 RX ADMIN — SUCCINYLCHOLINE CHLORIDE 120 MG: 20 INJECTION, SOLUTION INTRAMUSCULAR; INTRAVENOUS at 12:10

## 2018-10-02 RX ADMIN — MEPERIDINE HYDROCHLORIDE 25 MG: 50 INJECTION INTRAMUSCULAR; INTRAVENOUS; SUBCUTANEOUS at 01:10

## 2018-10-02 RX ADMIN — CISATRACURIUM BESYLATE 2 MG: 2 INJECTION INTRAVENOUS at 12:10

## 2018-10-02 NOTE — PLAN OF CARE
Dr. Carrillo at bedside spoke to pt about surgery and outcome. Pt verbalize understanding. Pt ride called. Pt up to bathroom dressed, voided x1. No signs of distress noted. Pt up at bedside talking on phone. Will continue to monitor. FRIEDA ORTA

## 2018-10-02 NOTE — TRANSFER OF CARE
"Anesthesia Transfer of Care Note    Patient: Patrica Donato    Procedure(s) Performed: Procedure(s) (LRB):  CHOLECYSTECTOMY-LAPAROSCOPIC (N/A)    Patient location: PACU    Anesthesia Type: general    Transport from OR: Transported from OR on room air with adequate spontaneous ventilation    Post pain: adequate analgesia    Post assessment: no apparent anesthetic complications and tolerated procedure well    Post vital signs: stable    Level of consciousness: awake, alert and oriented    Nausea/Vomiting: no nausea/vomiting    Complications: none    Transfer of care protocol was followed      Last vitals:   Visit Vitals  /62   Pulse 92   Temp 36.6 °C (97.8 °F) (Oral)   Resp 16   Ht 5' 6" (1.676 m)   Wt 83.9 kg (185 lb)   SpO2 (!) 85%   Breastfeeding? No   BMI 29.86 kg/m²     "

## 2018-10-02 NOTE — DISCHARGE SUMMARY
[facility]  Discharge Note  Short Stay    Admit Date: 10/2/2018    Discharge Date and Time: No discharge date for patient encounter.     Attending Physician: Ovidio Carrillo MD     Discharge Provider: Ovidio Carrillo    Diagnoses:  Active Hospital Problems    Diagnosis  POA    *Chronic cholecystitis [K81.1]  Yes      Resolved Hospital Problems   No resolved problems to display.       Discharged Condition: good    Hospital Course: Patient was admitted for an outpatient procedure and tolerated the procedure well with no complications.    Final Diagnoses: Same as principal problem.    Disposition: Home or Self Care    Follow up/Patient Instructions:    Medications:  Reconciled Home Medications:      Medication List      CONTINUE taking these medications    amLODIPine 5 MG tablet  Commonly known as:  NORVASC  Take 1 tablet (5 mg total) by mouth once daily.     cloNIDine 0.1 MG tablet  Commonly known as:  CATAPRES  Take 1 tablet (0.1 mg total) by mouth every 8 (eight) hours as needed.     levothyroxine 137 MCG Tab tablet  Commonly known as:  SYNTHROID  Take 1 tablet (137 mcg total) by mouth before breakfast.     metoprolol succinate 100 MG 24 hr tablet  Commonly known as:  TOPROL-XL  Take 1 tablet (100 mg total) by mouth once daily.     ondansetron 4 MG tablet  Commonly known as:  ZOFRAN  Take 1 tablet (4 mg total) by mouth every 6 (six) hours as needed for Nausea.     oxyCODONE-acetaminophen  mg per tablet  Commonly known as:  PERCOCET  Take 1 tablet by mouth every 4 (four) hours as needed for Pain.     pravastatin 20 MG tablet  Commonly known as:  PRAVACHOL  Take 1 tablet (20 mg total) by mouth once daily.     VITAMIN A ORAL  Take by mouth.     VITAMIN D-3 ORAL  Take by mouth.     VITAMIN K ORAL  Take by mouth.          Discharge Procedure Orders   Diet Adult Regular   Scheduling Instructions: Low Fat [place]     Other restrictions (specify):   Scheduling Instructions: No bending, lifting, pushing,  pulling, climbing, driving, etc.  Nothing even the least bit exertional or strenuous.     Follow-up Information     Ovidio Carrillo MD. Schedule an appointment as soon as possible for a visit in 2 weeks.    Specialty:  General Surgery  Why:  Routine Postop Visit  Contact information:  [address]  [place] [facility] [address] [phone]                   Discharge Procedure Orders (must include Diet, Follow-up, Activity):   Discharge Procedure Orders (must include Diet, Follow-up, Activity)   Diet Adult Regular   Scheduling Instructions: Low Fat [place]     Other restrictions (specify):   Scheduling Instructions: No bending, lifting, pushing, pulling, climbing, driving, etc.  Nothing even the least bit exertional or strenuous.

## 2018-10-02 NOTE — INTERVAL H&P NOTE
The patient has been examined and the H&P has been reviewed:    I concur with the findings and no changes have occurred since H&P was written.     Lab results were reviewed from 10/2/2018.  CBC showed no evidence of leukocytosis, anemia, or thrombocytopenia.  Electrolytes were all in the range of normal.  BUN and creatinine showed a mild pre renal azotemia with a BUN of 23 mg/dL, creatinine of 0.7 mg/dL and an estimated GFR greater than 60 mL/min.  Liver profile showed no evidence of hepatocellular disease or biliary obstruction. Glucose showed no suspicion diabetes.  Amylase and lipase showed no evidence of pancreatitis.  TSH was slightly elevated at 6.35.  T4 was at the lower limits of normal, 0.71.  Free T3 is pending.    EKG was reviewed from 10/2/2018.  Tracing showed a normal sinus rhythm and no evidence of any ischemic changes      Surgery risks, benefits and alternative options discussed and understood by patient/family.          Active Hospital Problems    Diagnosis  POA    Chronic cholecystitis [K81.1]  Yes      Resolved Hospital Problems   No resolved problems to display.

## 2018-10-02 NOTE — PLAN OF CARE
Pt A, A, OX3. PT SITTING TALKING ON PHONE. C/O TRAP AIR, NO SIGNS OF DISTRESS. WILL CONTINUE TO MONITOR. FRIEDA ORTA

## 2018-10-02 NOTE — ANESTHESIA PREPROCEDURE EVALUATION
10/02/2018  Patrica Donato is a 58 y.o., female.    Pre-op Assessment    I have reviewed the Patient Summary Reports.    I have reviewed the Nursing Notes.   I have reviewed the Medications.     Review of Systems  Anesthesia Hx:  Neg history of prior surgery. Denies Family Hx of Anesthesia complications.   Denies Personal Hx of Anesthesia complications.   Hematology/Oncology:  Hematology Normal   Oncology Normal     EENT/Dental:EENT/Dental Normal   Cardiovascular:   Hypertension    Pulmonary:  Pulmonary Normal    Renal/:  Renal/ Normal     Hepatic/GI:   GERD, well controlled    Musculoskeletal:  Musculoskeletal Normal    Neurological:  Neurology Normal    Endocrine:   Hypothyroidism    Dermatological:  Skin Normal    Psych:  Psychiatric Normal           Physical Exam  General:  Well nourished    Airway/Jaw/Neck:  AIRWAY FINDINGS: Normal      Eyes/Ears/Nose:  EYES/EARS/NOSE FINDINGS: Normal   Dental:  DENTAL FINDINGS: Normal   Chest/Lungs:  Chest/Lungs Clear    Heart/Vascular:  Heart Findings: Normal Heart murmur: negative Vascular Findings: Normal    Abdomen:  Abdomen Findings: Normal    Musculoskeletal:  Musculoskeletal Findings: Normal   Skin:  Skin Findings: Normal         Anesthesia Plan  Type of Anesthesia, risks & benefits discussed:  Anesthesia Type:  general  Patient's Preference:   Intra-op Monitoring Plan: standard ASA monitors  Intra-op Monitoring Plan Comments:   Post Op Pain Control Plan:   Post Op Pain Control Plan Comments:   Induction:   IV  Beta Blocker:  Patient is on a Beta-Blocker and has received one dose within the past 24 hours (No further documentation required).       Informed Consent: Patient understands risks and agrees with Anesthesia plan.  Questions answered. Anesthesia consent signed with patient.  ASA Score: 2     Day of Surgery Review of History & Physical:    H&P  update referred to the provider.

## 2018-10-02 NOTE — DISCHARGE INSTRUCTIONS
Discharge Instructions: After Your Surgery  Youve just had surgery. During surgery, you were given medicine called anesthesia to keep you relaxed and free of pain. After surgery, you may have some pain or nausea. This is common. Here are some tips for feeling better and getting well after surgery.     Stay on schedule with your medicine.   Going home  Your healthcare provider will show you how to take care of yourself when you go home. He or she will also answer your questions. Have an adult family member or friend drive you home. For the first 24 hours after your surgery:  · Do not drive or use heavy equipment.  · Do not make important decisions or sign legal papers.  · Do not drink alcohol.  · Have someone stay with you, if needed. He or she can watch for problems and help keep you safe.  Be sure to go to all follow-up visits with your healthcare provider. And rest after your surgery for as long as your healthcare provider tells you to.  Coping with pain  If you have pain after surgery, pain medicine will help you feel better. Take it as told, before pain becomes severe. Also, ask your healthcare provider or pharmacist about other ways to control pain. This might be with heat, ice, or relaxation. And follow any other instructions your surgeon or nurse gives you.  Tips for taking pain medicine  To get the best relief possible, remember these points:  · Pain medicines can upset your stomach. Taking them with a little food may help.  · Most pain relievers taken by mouth need at least 20 to 30 minutes to start to work.  · Taking medicine on a schedule can help you remember to take it. Try to time your medicine so that you can take it before starting an activity. This might be before you get dressed, go for a walk, or sit down for dinner.  · Constipation is a common side effect of pain medicines. Call your healthcare provider before taking any medicines such as laxatives or stool softeners to help ease constipation.  Also ask if you should skip any foods. Drinking lots of fluids and eating foods such as fruits and vegetables that are high in fiber can also help. Remember, do not take laxatives unless your surgeon has prescribed them.  · Drinking alcohol and taking pain medicine can cause dizziness and slow your breathing. It can even be deadly. Do not drink alcohol while taking pain medicine.  · Pain medicine can make you react more slowly to things. Do not drive or run machinery while taking pain medicine.  Your healthcare provider may tell you to take acetaminophen to help ease your pain. Ask him or her how much you are supposed to take each day. Acetaminophen or other pain relievers may interact with your prescription medicines or other over-the-counter (OTC) medicines. Some prescription medicines have acetaminophen and other ingredients. Using both prescription and OTC acetaminophen for pain can cause you to overdose. Read the labels on your OTC medicines with care. This will help you to clearly know the list of ingredients, how much to take, and any warnings. It may also help you not take too much acetaminophen. If you have questions or do not understand the information, ask your pharmacist or healthcare provider to explain it to you before you take the OTC medicine.  Managing nausea  Some people have an upset stomach after surgery. This is often because of anesthesia, pain, or pain medicine, or the stress of surgery. These tips will help you handle nausea and eat healthy foods as you get better. If you were on a special food plan before surgery, ask your healthcare provider if you should follow it while you get better. These tips may help:  · Do not push yourself to eat. Your body will tell you when to eat and how much.  · Start off with clear liquids and soup. They are easier to digest.  · Next try semi-solid foods, such as mashed potatoes, applesauce, and gelatin, as you feel ready.  · Slowly move to solid foods. Dont  eat fatty, rich, or spicy foods at first.  · Do not force yourself to have 3 large meals a day. Instead eat smaller amounts more often.  · Take pain medicines with a small amount of solid food, such as crackers or toast, to avoid nausea.     Call your surgeon if  · You still have pain an hour after taking medicine. The medicine may not be strong enough.  · You feel too sleepy, dizzy, or groggy. The medicine may be too strong.  · You have side effects like nausea, vomiting, or skin changes, such as rash, itching, or hives.       If you have obstructive sleep apnea  You were given anesthesia medicine during surgery to keep you comfortable and free of pain. After surgery, you may have more apnea spells because of this medicine and other medicines you were given. The spells may last longer than usual.   At home:  · Keep using the continuous positive airway pressure (CPAP) device when you sleep. Unless your healthcare provider tells you not to, use it when you sleep, day or night. CPAP is a common device used to treat obstructive sleep apnea.  · Talk with your provider before taking any pain medicine, muscle relaxants, or sedatives. Your provider will tell you about the possible dangers of taking these medicines.  Date Last Reviewed: 12/1/2016 © 2000-2017 [employer]. [address] [place] [address]. All rights reserved. This information is not intended as a substitute for professional medical care. Always follow your healthcare professional's instructions.        Cholecystectomy     Clips close off the duct connecting the gallbladder to the bile duct. The gallbladder is then removed.     Youve had painful attacks caused by gallstones. To treat the problem, your healthcare provider wants to remove your gallbladder. This surgery is called cholecystectomy. Removing the gallbladder can relieve pain. It will also prevent future attacks. You can live a healthy life without your gallbladder. You may  also be able to go back to eating foods you enjoyed before your gallbladder problems started.  Before your surgery  Be prepared:  · Tell your provider what medicines you take. Include those bought over the counter. Also include herbs or supplements. Be sure to mention if you take prescription blood thinners. This includes warfarin, clopidogrel, and aspirin.  · Have any tests your provider asks for, such as blood tests.  · Dont eat or drink after midnight, the night before your surgery. This includes water, coffee, and mints. However, you may need to take some medicine with sips of water--talk with your healthcare provider.  The day of surgery  When you arrive, you will prepare for surgery:  · An IV line will be put into a vein in your arm or hand. This gives you fluids and medicine.  · An anesthesiologist will talk with you about anesthesia. This is medicine used to prevent pain. You will receive general anesthesia. This puts you into a state like deep sleep through the procedure.  During surgery  There are 2 methods for removing the gallbladder. Your healthcare provider will choose which method is best for you:  · Laparoscopic cholecystectomy. This is most common. During surgery, 2 to 4 small incisions are made. A thin tube with a camera is used. This is called a laparoscope. The scope is put through one of the incisions. It sends images to a video screen. Surgical tools are put through other incisions. The gallbladder is removed using the scope and these tools.  · Open cholecystectomy. One larger incision is made. The surgeon sees and works through this incision. Open surgery is most often used when scarring or other factors make it a better choice for you.  In some cases, safety requires a change from laparoscopic to open surgery during the procedure.  After surgery  You will be sent to a room to wake up from the anesthesia. You will likely go home the same day. In some cases, an overnight stay is needed. If you  had open cholecystectomy, you may need to stay in the hospital for a few days. When you are released to go home, have a family member or friend ready to drive you.  Risks and possible complications of gallbladder surgery  All surgeries have risks. The risks of gallbladder surgery include:  · Bleeding  · Infection  · Injury to the common bile duct or nearby organs  · Blood clots in the legs  · Bile leaks  · Hernia at incision site  · Pnemonia   Date Last Reviewed: 7/1/2016 © 2000-2017 [employer]. [address] [place] [address], [place] [address]. All rights reserved. This information is not intended as a substitute for professional medical care. Always follow your healthcare professional's instructions.

## 2018-10-02 NOTE — OP NOTE
DATE OF PROCEDURE:  10/02/2018.    SURGEON:  Ovidio Carrillo M.D.    ASSISTANT:  None.    PREOPERATIVE DIAGNOSIS:  Chronic cholecystitis.    POSTOPERATIVE DIAGNOSIS:  Chronic cholecystitis.    SURGICAL PROCEDURE PERFORMED:  Laparoscopic cholecystectomy.    ANESTHESIA:  General endotracheal.    INDICATIONS FOR PROCEDURE:  A 58-year-old female with postprandial right  upper quadrant abdominal pain associated with nausea.  HIDA scan showed a  diminished ejection fraction.    SURGICAL FINDINGS:  The patient had moderate chronic cholecystitis with an  enlarged thick walled gallbladder with mild fibrosis in the gallbladder bed  and triangle of Calot.  The cystic duct, common bile duct, common hepatic  duct, right hepatic artery were all anatomically unremarkable.  She did  have a medial and lateral branch of the cystic artery.  No other abdominal  pathology was encountered.    PROCEDURE IN DETAILS:  Informed consent process was reviewed again in  preoperative holding including the risks, benefits, possible complications  of, details of, and indications for each option. The patient again  verbalized understanding and consent. Preoperative IV antibiotics were  administered. The patient was taken to the operating room. A Time Out was  completed. The correct patient, procedure, operative site, position,  consent, allergies, and administration of antibiotics were confirmed by all  members of the operating team. General anesthesia was then induced without  difficulty or complications. The abdomen was then prepped and draped in the  usual sterile fashion with Betadine scrub and paint solution and sterile  towels and drapes. Marcaine 0.5% with epinephrine was infiltrated into each  port site as each port was placed for postoperative analgesia. A total of  20 mL was used. A 1 cm periumbilical skin incision was made with a #15  blade. Subcutaneous tissue was dissected bluntly. Anterior surface of the  abdominal fascia was exposed.  Traction sutures were placed on either side  of the fascial midline. The fascia was then incised in the midline with a  #15 blade. Peritoneum was entered bluntly. Inner surface of the anterior  abdominal wall was examined digitally. No adhesions or other pathology  encountered. A 12 mm blunt-tipped Rachel trocar was introduced without  difficulty or resistance. Pneumoperitoneum was established in a slow,  gentle, stepwise, sequential fashion to a maximum of 15 mmHg. Pauses were  made at the 5 mm and 10 mm levels to ensure hemodynamic stability.  Telescope was inserted, and the abdominal cavity was inspected. There were  no evident intraabdominal or retroperitoneal injuries. Three 5 mm trocars  were then inserted through separate skin incisions, all under laparoscopic  visualization, without difficulty, complications, or resistance (1 in the  epigastrium, 2 in the right upper quadrant). Anterior and cephalad traction  was applied to the dome of the gallbladder. Adhesions were lysed as  necessary with blunt and electrocautery dissection. Inferior and lateral  traction was applied to the infundibulum. Blunt dissection was carried out  in the triangle of Calot to identify the cystic duct, common bile duct,  common hepatic duct, cystic artery, and the right hepatic artery. After all  these structures were clearly identified, the cystic duct was clipped twice  proximally and once distally. The cystic artery was clipped twice  proximally and once distally. These structures were then divided between  the proximal and distal clips. Gallbladder was then freed from its  attachments to the liver with blunt and electrocautery dissection. Just  prior to completing the amputation of the gallbladder, tension was released  on the gallbladder and hemostasis of the gallbladder bed was ensured.  Gallbladder was then completely freed from the liver. Camera was moved to  the epigastric port. Gallbladder was delivered through the  umbilical port.  Umbilical port was replaced. Pneumoperitoneum was reestablished. Camera was  moved back to the umbilical port. Abdomen was irrigated copiously. All  irrigation solution was evacuated. Hemostasis was ensured throughout. The  three 5 mm ports were removed, and hemostasis was ensured at these sites by  careful inspection of the internal and external surfaces. Pneumoperitoneum  was then completely evacuated, and the umbilical port was removed.  Hemostasis was ensured at this site by careful inspection. Peritoneum at  the umbilical site was closed with 3-0 Vicryl, fascia with 0 Vicryl, and  subcutaneous tissue with 3-0. Skin was then closed at all sites with 4-0  undyed subcuticular Vicryl suture. Sterile Dermabond dressings were  applied. The patient was emerged from the anesthetic without difficulty or  complications and transported to the recovery room in good condition. All  counts were correct. No evident surgical complications. Estimated blood  loss as noted above.    SPECIMEN:  Gallbladder.    BLOOD LOSS:  Less than 5 mL    DISPOSITION:  PACU, stable.        ROXANNE/KAN dd: 10/02/2018 13:58:52 (CDT)   td: 10/02/2018 14:36:32 (CDT)  Doc ID #[id number]   Job ID #[id number]    CC:

## 2018-10-02 NOTE — PLAN OF CARE
Patient awake and alert. VSS. Tolerating ice chips and water well. Ride contacted and told that the patient would be ready to go home at 4:30.

## 2018-10-02 NOTE — ANESTHESIA POSTPROCEDURE EVALUATION
"Anesthesia Post Evaluation    Patient: Patrica Donato    Procedure(s) Performed: Procedure(s) (LRB):  CHOLECYSTECTOMY-LAPAROSCOPIC (N/A)    Final Anesthesia Type: general  Patient location during evaluation: PACU  Patient participation: Yes- Able to Participate  Level of consciousness: awake and awake and alert  Post-procedure vital signs: reviewed and stable  Pain management: adequate  Airway patency: patent  PONV status at discharge: No PONV  Anesthetic complications: no      Cardiovascular status: blood pressure returned to baseline  Respiratory status: unassisted and spontaneous ventilation  Hydration status: euvolemic  Follow-up not needed.        Visit Vitals  /70   Pulse 81   Temp 36.6 °C (97.9 °F) (Axillary)   Resp 10   Ht 5' 6" (1.676 m)   Wt 83.9 kg (185 lb)   SpO2 98%   Breastfeeding? No   BMI 29.86 kg/m²       Pain/Omkar Score: Pain Assessment Performed: Yes (10/2/2018 11:41 AM)  Presence of Pain: denies (10/2/2018 11:41 AM)  Omkar Score: 10 (10/2/2018  3:00 PM)        "

## 2018-11-06 DIAGNOSIS — E03.9 HYPOTHYROIDISM, UNSPECIFIED TYPE: Primary | ICD-10-CM

## 2018-11-06 RX ORDER — LEVOTHYROXINE SODIUM 75 UG/1
75 TABLET ORAL DAILY
Qty: 30 TABLET | Refills: 3 | Status: SHIPPED | OUTPATIENT
Start: 2018-11-06 | End: 2018-12-20 | Stop reason: DRUGHIGH

## 2018-11-06 NOTE — PROGRESS NOTES
Dose adjustment of Synthroid.  Ms. Donato was symptomatically hyperthyroid.  She decreased her levothyroxine to every 3 days.  About 5 days later TFTs were collected.  TSH indicates she is mildly hypothyroid.  Free T3 and free T4 are both at the very lower limits of normal.  She was taking 137 mcg of levothyroxine daily when she felt hyperthyroid with severe tachycardia and palpitations as well as tremors and diarrhea.    It is likely that she was hyperthyroid at the previous dose but is rapidly reapproaching hypothyroidism biochemically.  Dose adjustment is warranted.    Levothyroxine will be decreased to 75 mcg daily.  Repeat TFTs in 6 weeks.

## 2018-12-11 DIAGNOSIS — L85.3 DRY SKIN DERMATITIS: Primary | ICD-10-CM

## 2018-12-19 ENCOUNTER — LAB VISIT (OUTPATIENT)
Dept: LAB | Facility: HOSPITAL | Age: 58
End: 2018-12-19
Attending: SURGERY
Payer: COMMERCIAL

## 2018-12-19 DIAGNOSIS — E03.9 HYPOTHYROIDISM, UNSPECIFIED TYPE: ICD-10-CM

## 2018-12-19 LAB
T3FREE SERPL-MCNC: 1.8 PG/ML
T4 FREE SERPL-MCNC: 0.72 NG/DL
TSH SERPL DL<=0.005 MIU/L-ACNC: 17.71 UIU/ML

## 2018-12-19 PROCEDURE — 36415 COLL VENOUS BLD VENIPUNCTURE: CPT

## 2018-12-19 PROCEDURE — 84439 ASSAY OF FREE THYROXINE: CPT

## 2018-12-19 PROCEDURE — 84443 ASSAY THYROID STIM HORMONE: CPT

## 2018-12-19 PROCEDURE — 84481 FREE ASSAY (FT-3): CPT

## 2018-12-20 DIAGNOSIS — E03.9 ACQUIRED HYPOTHYROIDISM: Primary | ICD-10-CM

## 2018-12-20 RX ORDER — LEVOTHYROXINE SODIUM 100 UG/1
100 TABLET ORAL DAILY
Qty: 30 TABLET | Refills: 11 | Status: SHIPPED | OUTPATIENT
Start: 2018-12-20 | End: 2019-05-24 | Stop reason: SDUPTHER

## 2018-12-20 NOTE — PROGRESS NOTES
Repeat TFTs obtained yesterday.  TFTs indicate significant hypothyroidism.  Currently taking 75 mcg levothyroxine daily.  Experiencing intermittent palpitations and insomnia.  Levothyroxine will be increased to 100 mcg daily.  Previously she was taking 137 mcg daily, at that dose she was experiencing tachycardia, flushing, and diarrhea.  Laboratory studies at that dose also indicated hyperthyroidism.  Current symptoms will be monitored.  Insomnia will be addressed if persists in 2 weeks.  Palpitations will be evaluated if exacerbated by increased dose.  Further management will depend upon clinical course.  Repeat TFTs in early March.

## 2019-04-10 ENCOUNTER — LAB VISIT (OUTPATIENT)
Dept: LAB | Facility: HOSPITAL | Age: 59
End: 2019-04-10
Attending: SURGERY
Payer: COMMERCIAL

## 2019-04-10 DIAGNOSIS — E03.9 ACQUIRED HYPOTHYROIDISM: ICD-10-CM

## 2019-04-10 LAB
T3FREE SERPL-MCNC: 1.6 PG/ML (ref 2.3–4.2)
T4 FREE SERPL-MCNC: 0.69 NG/DL (ref 0.71–1.51)
TSH SERPL DL<=0.005 MIU/L-ACNC: 16.54 UIU/ML (ref 0.34–5.6)

## 2019-04-10 PROCEDURE — 84481 FREE ASSAY (FT-3): CPT

## 2019-04-10 PROCEDURE — 36415 COLL VENOUS BLD VENIPUNCTURE: CPT

## 2019-04-10 PROCEDURE — 84443 ASSAY THYROID STIM HORMONE: CPT

## 2019-04-10 PROCEDURE — 84439 ASSAY OF FREE THYROXINE: CPT

## 2019-04-16 RX ORDER — LEVOTHYROXINE SODIUM 112 UG/1
112 TABLET ORAL
Qty: 30 TABLET | Refills: 3 | Status: SHIPPED | OUTPATIENT
Start: 2019-04-16 | End: 2019-06-03 | Stop reason: SDUPTHER

## 2019-04-16 NOTE — PROGRESS NOTES
Repeat TFTs were collected on 4/10/2019.  At this time Patrica is taking 100 mcg of levothyroxine daily.  She has been completely compliant with therapy.  She is not experiencing any palpitations or tachycardia at this time.  Insomnia has resolved.  No symptoms of hypothyroidism.  No symptoms of hyperthyroidism.  TFTs on 4/10/2019 indicates she is still hypothyroid.  Medication will be increased slightly and slowly in an effort to establish a euthyroid state without precipitating palpitations or tachycardia.  She will begin taking levothyroxine 100 mcg daily for 2 days and 112 mcg every third day.  Medication will be adjusted in 1 month based upon symptomatology.  If she is not experiencing any palpitations, tachycardia or symptoms of hyperthyroidism her dose will be increased to an every other day alternating regimen of 100 mcg and 112 mcg.    TFTs will be repeated again 6 weeks after a final new regimen has been selected and implemented.

## 2019-05-24 ENCOUNTER — OFFICE VISIT (OUTPATIENT)
Dept: PODIATRY | Facility: CLINIC | Age: 59
End: 2019-05-24
Payer: COMMERCIAL

## 2019-05-24 VITALS
WEIGHT: 180 LBS | DIASTOLIC BLOOD PRESSURE: 73 MMHG | HEART RATE: 81 BPM | RESPIRATION RATE: 19 BRPM | SYSTOLIC BLOOD PRESSURE: 127 MMHG | HEIGHT: 66 IN | BODY MASS INDEX: 28.93 KG/M2

## 2019-05-24 DIAGNOSIS — D36.10 NEUROMA: Primary | ICD-10-CM

## 2019-05-24 PROCEDURE — 3074F SYST BP LT 130 MM HG: CPT | Mod: S$GLB,,, | Performed by: PODIATRIST

## 2019-05-24 PROCEDURE — 3078F DIAST BP <80 MM HG: CPT | Mod: S$GLB,,, | Performed by: PODIATRIST

## 2019-05-24 PROCEDURE — 64455 PR INJECT ANES/STEROID PLANTAR COMMON DIGITAL NERVE: ICD-10-PCS | Mod: RT,S$GLB,, | Performed by: PODIATRIST

## 2019-05-24 PROCEDURE — 64455 NJX AA&/STRD PLTR COM DG NRV: CPT | Mod: RT,S$GLB,, | Performed by: PODIATRIST

## 2019-05-24 PROCEDURE — 3078F PR MOST RECENT DIASTOLIC BLOOD PRESSURE < 80 MM HG: ICD-10-PCS | Mod: S$GLB,,, | Performed by: PODIATRIST

## 2019-05-24 PROCEDURE — 3074F PR MOST RECENT SYSTOLIC BLOOD PRESSURE < 130 MM HG: ICD-10-PCS | Mod: S$GLB,,, | Performed by: PODIATRIST

## 2019-05-24 PROCEDURE — 99999 PR PBB SHADOW E&M-EST. PATIENT-LVL III: ICD-10-PCS | Mod: PBBFAC,,, | Performed by: PODIATRIST

## 2019-05-24 PROCEDURE — 99999 PR PBB SHADOW E&M-EST. PATIENT-LVL III: CPT | Mod: PBBFAC,,, | Performed by: PODIATRIST

## 2019-05-24 PROCEDURE — 99211 OFF/OP EST MAY X REQ PHY/QHP: CPT | Mod: 25,S$GLB,, | Performed by: PODIATRIST

## 2019-05-24 PROCEDURE — 99211 PR OFFICE/OUTPT VISIT, EST, LEVL I: ICD-10-PCS | Mod: 25,S$GLB,, | Performed by: PODIATRIST

## 2019-05-24 RX ORDER — METHYLPREDNISOLONE ACETATE 80 MG/ML
80 INJECTION, SUSPENSION INTRA-ARTICULAR; INTRALESIONAL; INTRAMUSCULAR; SOFT TISSUE ONCE
Status: COMPLETED | OUTPATIENT
Start: 2019-05-24 | End: 2019-05-24

## 2019-05-24 RX ORDER — DEXAMETHASONE SODIUM PHOSPHATE 4 MG/ML
4 INJECTION, SOLUTION INTRA-ARTICULAR; INTRALESIONAL; INTRAMUSCULAR; INTRAVENOUS; SOFT TISSUE ONCE
Status: COMPLETED | OUTPATIENT
Start: 2019-05-24 | End: 2019-05-24

## 2019-05-24 RX ORDER — LIDOCAINE HYDROCHLORIDE 10 MG/ML
2 INJECTION INFILTRATION; PERINEURAL
Status: COMPLETED | OUTPATIENT
Start: 2019-05-24 | End: 2019-05-24

## 2019-05-24 RX ADMIN — DEXAMETHASONE SODIUM PHOSPHATE 4 MG: 4 INJECTION, SOLUTION INTRA-ARTICULAR; INTRALESIONAL; INTRAMUSCULAR; INTRAVENOUS; SOFT TISSUE at 12:05

## 2019-05-24 RX ADMIN — LIDOCAINE HYDROCHLORIDE 2 ML: 10 INJECTION INFILTRATION; PERINEURAL at 12:05

## 2019-05-24 RX ADMIN — METHYLPREDNISOLONE ACETATE 80 MG: 80 INJECTION, SUSPENSION INTRA-ARTICULAR; INTRALESIONAL; INTRAMUSCULAR; SOFT TISSUE at 12:05

## 2019-05-24 NOTE — LETTER
May 27, 2019      Ovidio Carrillo MD  [address]  [facility] [place] [address]           [facility]  [address] [facility] [place] [address]  Phone: [phone]  Fax: [phone]          Patient: Patrica Donato   MR Number: [id number]   YOB: 1960   Date of Visit: 5/24/2019       Dear Dr. Ovidio Carrillo:    Thank you for referring Patrica Donato to me for evaluation. Attached you will find relevant portions of my assessment and plan of care.    If you have questions, please do not hesitate to call me. I look forward to following Patrica Donato along with you.    Sincerely,    Elaine Khanna  CC:  No Recipients    If you would like to receive this communication electronically, please contact [email or web address] or [phone] to request more information on [facility] Link access.    For providers and/or their staff who would like to refer a patient to [facility], please contact us through our one-stop-shop provider referral line, [facility], at [phone].    If you feel you have received this communication in error or would no longer like to receive these types of communications, please e-mail [email or web address]

## 2019-06-02 NOTE — PROGRESS NOTES
Subjective:      Patient ID: Patrica Donato is a 59 y.o. female.    Chief Complaint: Foot Problem  Patient presents with complaint of pain involving 2nd digit right foot.  She feels this is a neuroma, she has had neuromas in the past, had neuroma surgery  Of the 3rd web space right foot.  Reports this is gradually been progressing, and pain now so significant she is limping.  Patient has activity planned with [email or web address] where she will be to do a lot of walking, inquires if injection can be done due to pain level.    ROS     Constitutional    Pleasant, well-nourished, no distress    Cardiovascular          No chest pain, no shortness of breath    Respiratory           No cough, no congestion     Musculoskeletal        No muscle aches, no arthralgias, no swelling in the extremities          Objective:      Physical Exam  Vascular         Arterial Pulses Right: posterior tibialis 2/4, dorsalis pedis 2/4, normal CFT   Arterial Pulses Left: posterior tibialis 2/4, dorsalis pedis 2/4, normal CFT   No lower extremity edema bilateral   Pedal skin temperature and color are normal bilateral     Integumentary   Subtle pedal edema right forefoot.   No erythema or calor  Longitudinal scar over 3rd webspace right foot consistent with previous neuroma surgery        Two fairly large, firm, nontender plantar fibroma medial band left arch    Neurological         Gross sensation intact, pain upon dorsal to plantar compression 2nd webspace right foot consistent with neuroma    Musculoskeletal   Muscle Strength/Testing and Tone:  Intact, normal tone bilateral   Joints, Bones, and Muscles: Normal with normal ROM        Tennis shoes         Antalgic gait         Assessment:       Encounter Diagnosis   Name Primary?    Neuroma - Right Foot Yes         Plan:       Patrica was seen today for foot problem.    Diagnoses and all orders for this visit:    Neuroma - Right Foot    Other orders  -     dexamethasone injection 4 mg  -      methylPREDNISolone acetate injection 80 mg  -     lidocaine HCL 10 mg/ml (1%) injection 2 mL      Reviewed neuroma with patient.  We discussed cortisone injection, effectiveness of medication  reducing inflammation, potential side effects, discomfort following injection, length of time injection may be beneficial.  Instructed patient to utilize ice /cool therapy as tolerated for post-injection pain.  Patient was in understanding and agreement with treatment plan and did want to pursue injection today.  Patient was administered an injection 2nd common plantar digital nerve right foot.  Patient tolerated well  I counseled the patient on her conditions, their implications and medical management.  Instructed patient to contact the office with any changes, questions, concerns, worsening of symptoms, if not much improved in 3-4 days are not resolved in 7 days.  Patient verbalized understanding.   Total face to face time, exam, assessment, treatment, discussion, documentation 10 minutes, more than half this time spent on consultation and coordination of care.   Follow up as needed    This note was created using [employer] voice recognition software that occasionally misinterpreted phrases or words.

## 2019-06-03 RX ORDER — LEVOTHYROXINE SODIUM 112 UG/1
112 TABLET ORAL
Qty: 30 TABLET | Refills: 3 | Status: SHIPPED | OUTPATIENT
Start: 2019-06-03 | End: 2019-07-15 | Stop reason: SDUPTHER

## 2019-07-15 DIAGNOSIS — I10 ESSENTIAL HYPERTENSION: ICD-10-CM

## 2019-07-15 DIAGNOSIS — R00.0 TACHYCARDIA: ICD-10-CM

## 2019-07-15 RX ORDER — METOPROLOL SUCCINATE 100 MG/1
100 TABLET, EXTENDED RELEASE ORAL DAILY
Qty: 90 TABLET | Refills: 0 | Status: SHIPPED | OUTPATIENT
Start: 2019-07-15 | End: 2019-09-17 | Stop reason: SDUPTHER

## 2019-07-15 RX ORDER — AMLODIPINE BESYLATE 5 MG/1
5 TABLET ORAL DAILY
Qty: 90 TABLET | Refills: 0 | Status: SHIPPED | OUTPATIENT
Start: 2019-07-15 | End: 2019-09-17 | Stop reason: SDUPTHER

## 2019-07-15 RX ORDER — LEVOTHYROXINE SODIUM 112 UG/1
112 TABLET ORAL
Qty: 30 TABLET | Refills: 0 | Status: SHIPPED | OUTPATIENT
Start: 2019-07-15 | End: 2019-09-17 | Stop reason: SDUPTHER

## 2019-07-15 NOTE — PROGRESS NOTES
Ms. Donato is well known to me.  She is currently in transition between PCPs.  She is out of her routine medications for hypertension and hypothyroidism.  She has been taking both of these medications for years without adverse effects.  TFTs were recently obtained in indicated that she is currently under treated however she is asymptomatic and higher doses of levothyroxine have precipitated palpitations.

## 2019-09-10 DIAGNOSIS — E78.5 HYPERLIPIDEMIA, UNSPECIFIED HYPERLIPIDEMIA TYPE: ICD-10-CM

## 2019-09-10 RX ORDER — PRAVASTATIN SODIUM 20 MG/1
20 TABLET ORAL DAILY
Qty: 90 TABLET | Refills: 3 | Status: SHIPPED | OUTPATIENT
Start: 2019-09-10 | End: 2020-06-12 | Stop reason: SDUPTHER

## 2019-09-17 DIAGNOSIS — I10 ESSENTIAL HYPERTENSION: ICD-10-CM

## 2019-09-17 DIAGNOSIS — E03.9 ACQUIRED HYPOTHYROIDISM: Primary | ICD-10-CM

## 2019-09-17 DIAGNOSIS — R00.0 TACHYCARDIA: ICD-10-CM

## 2019-09-17 RX ORDER — LEVOTHYROXINE SODIUM 112 UG/1
112 TABLET ORAL
Qty: 30 TABLET | Refills: 0 | Status: SHIPPED | OUTPATIENT
Start: 2019-09-17 | End: 2019-12-06 | Stop reason: SDUPTHER

## 2019-09-17 RX ORDER — METOPROLOL SUCCINATE 100 MG/1
100 TABLET, EXTENDED RELEASE ORAL DAILY
Qty: 90 TABLET | Refills: 0 | Status: SHIPPED | OUTPATIENT
Start: 2019-09-17 | End: 2019-12-20 | Stop reason: SDUPTHER

## 2019-09-17 RX ORDER — AMLODIPINE BESYLATE 5 MG/1
5 TABLET ORAL DAILY
Qty: 90 TABLET | Refills: 0 | Status: SHIPPED | OUTPATIENT
Start: 2019-09-17 | End: 2019-12-20 | Stop reason: SDUPTHER

## 2019-09-17 NOTE — TELEPHONE ENCOUNTER
Ms. Donato is followed for hypothyroidism.  Thyroid replacement therapy has proven volatile.  She also has hyperlipidemia and essential hypertension.  She is due for follow-up laboratory studies.  She is currently nearly out of several medications.  Medications will be prescribed.  Laboratory studies ordered.  Follow-up arranged.

## 2019-12-06 RX ORDER — LEVOTHYROXINE SODIUM 112 UG/1
112 TABLET ORAL
Qty: 10 TABLET | Refills: 0 | Status: SHIPPED | OUTPATIENT
Start: 2019-12-06 | End: 2019-12-09 | Stop reason: SDUPTHER

## 2019-12-06 NOTE — TELEPHONE ENCOUNTER
----- Message from Maurice Harry sent at 12/6/2019 10:32 AM CST -----  Contact: Tammy Nguyen  [phone]  ..............Type:  RX Refill Request    Who Called:  Tammy Nguyen  [phone]    Refill or New Rx:  Refill    RX Name and Strength:  levothyroxine (SYNTHROID) 112 MCG tablet 30 tablet 0 9/17/2019 12/16/2019   Sig - Route: Take 1 tablet (112 mcg total) by mouth Every 3 (three) days. Take the 100 mcg tablet on all other days - Oral   Sent to pharmacy as: levothyroxine (SYNTHROID) 112 MCG tablet     How is the patient currently taking it? (ex. 1XDay):  See above    Is this a 30 day or 90 day RX: 90 day     Preferred Pharmacy with phone number:      [facility] [place] - [address] [address]  [address] [address]  [facility] [address]  Phone: [phone] Fax: [phone]    Local or Mail Order:  Local    Ordering Provider:  Dr Ovidio Mei Call Back Number:  Tammy Nguyen  [phone]    Additional Information:  Advised the ptnt states that the directions are incorrect, please call to confirm.

## 2019-12-09 DIAGNOSIS — M25.561 RIGHT KNEE PAIN, UNSPECIFIED CHRONICITY: Primary | ICD-10-CM

## 2019-12-09 DIAGNOSIS — I10 ESSENTIAL HYPERTENSION: Primary | ICD-10-CM

## 2019-12-09 DIAGNOSIS — E03.9 HYPOTHYROIDISM, UNSPECIFIED TYPE: ICD-10-CM

## 2019-12-09 RX ORDER — LEVOTHYROXINE SODIUM 112 UG/1
112 TABLET ORAL
Qty: 10 TABLET | Refills: 0 | Status: SHIPPED | OUTPATIENT
Start: 2019-12-09 | End: 2019-12-20 | Stop reason: DRUGHIGH

## 2019-12-10 ENCOUNTER — LAB VISIT (OUTPATIENT)
Dept: LAB | Facility: HOSPITAL | Age: 59
End: 2019-12-10
Attending: SURGERY
Payer: COMMERCIAL

## 2019-12-10 DIAGNOSIS — E03.9 ACQUIRED HYPOTHYROIDISM: ICD-10-CM

## 2019-12-10 LAB
T4 FREE SERPL-MCNC: <0.5 NG/DL (ref 0.71–1.51)
TSH SERPL DL<=0.005 MIU/L-ACNC: 20.54 UIU/ML (ref 0.34–5.6)

## 2019-12-10 PROCEDURE — 84443 ASSAY THYROID STIM HORMONE: CPT

## 2019-12-10 PROCEDURE — 84481 FREE ASSAY (FT-3): CPT

## 2019-12-10 PROCEDURE — 36415 COLL VENOUS BLD VENIPUNCTURE: CPT

## 2019-12-10 PROCEDURE — 84439 ASSAY OF FREE THYROXINE: CPT

## 2019-12-11 LAB — T3FREE SERPL-MCNC: 2.2 PG/ML (ref 2.3–4.2)

## 2019-12-13 ENCOUNTER — HOSPITAL ENCOUNTER (OUTPATIENT)
Dept: RADIOLOGY | Facility: HOSPITAL | Age: 59
Discharge: HOME | End: 2019-12-13
Attending: ORTHOPAEDIC SURGERY
Payer: COMMERCIAL

## 2019-12-13 ENCOUNTER — OFFICE VISIT (OUTPATIENT)
Dept: ORTHOPEDICS | Facility: CLINIC | Age: 59
End: 2019-12-13
Payer: COMMERCIAL

## 2019-12-13 VITALS
DIASTOLIC BLOOD PRESSURE: 84 MMHG | SYSTOLIC BLOOD PRESSURE: 144 MMHG | BODY MASS INDEX: 28.93 KG/M2 | HEART RATE: 86 BPM | HEIGHT: 66 IN | WEIGHT: 180 LBS

## 2019-12-13 DIAGNOSIS — M17.11 PRIMARY OSTEOARTHRITIS OF RIGHT KNEE: ICD-10-CM

## 2019-12-13 DIAGNOSIS — M25.561 RIGHT KNEE PAIN, UNSPECIFIED CHRONICITY: ICD-10-CM

## 2019-12-13 DIAGNOSIS — M71.21 BAKER CYST, RIGHT: ICD-10-CM

## 2019-12-13 DIAGNOSIS — M23.203 DEGENERATIVE TEAR OF MEDIAL MENISCUS, RIGHT: Primary | ICD-10-CM

## 2019-12-13 PROCEDURE — 3079F DIAST BP 80-89 MM HG: CPT | Mod: S$GLB,,, | Performed by: ORTHOPAEDIC SURGERY

## 2019-12-13 PROCEDURE — 3008F PR BODY MASS INDEX (BMI) DOCUMENTED: ICD-10-PCS | Mod: S$GLB,,, | Performed by: ORTHOPAEDIC SURGERY

## 2019-12-13 PROCEDURE — 3077F PR MOST RECENT SYSTOLIC BLOOD PRESSURE >= 140 MM HG: ICD-10-PCS | Mod: S$GLB,,, | Performed by: ORTHOPAEDIC SURGERY

## 2019-12-13 PROCEDURE — 20610 LARGE JOINT ASPIRATION/INJECTION: R KNEE: ICD-10-PCS | Mod: RT,S$GLB,, | Performed by: ORTHOPAEDIC SURGERY

## 2019-12-13 PROCEDURE — 3077F SYST BP >= 140 MM HG: CPT | Mod: S$GLB,,, | Performed by: ORTHOPAEDIC SURGERY

## 2019-12-13 PROCEDURE — 73562 XR KNEE 3 VIEW RIGHT: ICD-10-PCS | Mod: 26,RT,, | Performed by: RADIOLOGY

## 2019-12-13 PROCEDURE — 73562 X-RAY EXAM OF KNEE 3: CPT | Mod: 26,RT,, | Performed by: RADIOLOGY

## 2019-12-13 PROCEDURE — 73562 X-RAY EXAM OF KNEE 3: CPT | Mod: TC,PN,RT

## 2019-12-13 PROCEDURE — 99999 PR PBB SHADOW E&M-EST. PATIENT-LVL III: CPT | Mod: PBBFAC,,, | Performed by: ORTHOPAEDIC SURGERY

## 2019-12-13 PROCEDURE — 3008F BODY MASS INDEX DOCD: CPT | Mod: S$GLB,,, | Performed by: ORTHOPAEDIC SURGERY

## 2019-12-13 PROCEDURE — 3079F PR MOST RECENT DIASTOLIC BLOOD PRESSURE 80-89 MM HG: ICD-10-PCS | Mod: S$GLB,,, | Performed by: ORTHOPAEDIC SURGERY

## 2019-12-13 PROCEDURE — 99204 OFFICE O/P NEW MOD 45 MIN: CPT | Mod: 25,S$GLB,, | Performed by: ORTHOPAEDIC SURGERY

## 2019-12-13 PROCEDURE — 20610 DRAIN/INJ JOINT/BURSA W/O US: CPT | Mod: RT,S$GLB,, | Performed by: ORTHOPAEDIC SURGERY

## 2019-12-13 PROCEDURE — 99999 PR PBB SHADOW E&M-EST. PATIENT-LVL III: ICD-10-PCS | Mod: PBBFAC,,, | Performed by: ORTHOPAEDIC SURGERY

## 2019-12-13 PROCEDURE — 99204 PR OFFICE/OUTPT VISIT, NEW, LEVL IV, 45-59 MIN: ICD-10-PCS | Mod: 25,S$GLB,, | Performed by: ORTHOPAEDIC SURGERY

## 2019-12-13 RX ORDER — TRIAMCINOLONE ACETONIDE 40 MG/ML
40 INJECTION, SUSPENSION INTRA-ARTICULAR; INTRAMUSCULAR
Status: DISCONTINUED | OUTPATIENT
Start: 2019-12-13 | End: 2019-12-13 | Stop reason: HOSPADM

## 2019-12-13 RX ADMIN — TRIAMCINOLONE ACETONIDE 40 MG: 40 INJECTION, SUSPENSION INTRA-ARTICULAR; INTRAMUSCULAR at 09:12

## 2019-12-13 NOTE — PROCEDURES
Large Joint Aspiration/Injection: R knee  Date/Time: 12/13/2019 9:30 AM  Performed by: Edson Russ DO  Authorized by: Edson Russ, DO     Consent Done?:  Yes (Verbal)  Indications:  Pain, joint swelling and diagnostic evaluation  Procedure site marked: Yes    Timeout: Prior to procedure the correct patient, procedure, and site was verified      Location:  Knee  Site:  R knee  Prep: Patient was prepped and draped in usual sterile fashion    Needle size:  22 G  Ultrasonic Guidance for needle placement: No  Approach:  Anterolateral  Medications:  40 mg triamcinolone acetonide 40 mg/mL  Patient tolerance:  Patient tolerated the procedure well with no immediate complications

## 2019-12-13 NOTE — PROGRESS NOTES
Subjective:      Patient ID: Patrica Donato is a 59 y.o. female.    Chief Complaint: Knee Pain (R knee pain )    Referring Provider: Chris Self  No address on file    HPI:  Ms. Donato is a 5-year-old lady who presented today for evaluation of 1 month of right knee pain which began after she began to exercise a get ready for a vacation trip and then while on vacation she was ambulating a point of interest.  She had swelling but denies giving way and locking.  Traversing stairs increases her symptoms while propping her knee with a pillow behind it decreases it. She has not taken NSAIDs, worn a brace, done physical therapy, nor had injections.    Past Medical History:   Diagnosis Date    Arthritis     Hypertension      *  *  *  * Thyroid disease  Hyperlipidemia  Seasonal allergies  GERD  Exam      Past Surgical History:   Procedure Laterality Date    ANKLE SURGERY ORIF right ankle      APPENDECTOMY      CHOLECYSTECTOMY      HAND SURGERY pin left middle finger     * RK BILATERAL EYES    HYSTERECTOMY      KNEE ARTHROSCOPY left knee arthroscopy      LAPAROSCOPIC CHOLECYSTECTOMY N/A 10/2/2018    Procedure: CHOLECYSTECTOMY-LAPAROSCOPIC;  Surgeon: Ovidio Carrillo MD;  Location: [facility] OR;  Service: General;  Laterality: N/A;     * TONSILLECTOMY  TYMPANOSTOMY TUBES BILATERAL EARS  1965       Review of patient's allergies indicates:   Allergen Reactions    Cerave [ceramides 1,3,6-11]        Social History     Occupational History       Tobacco Use    Smoking status: Former Smoker     Last attempt to quit: 2015     Years since quittin.9    Smokeless tobacco: Never Used   Substance and Sexual Activity    Alcohol use: Yes     Alcohol/week: 2.0 standard drinks     Types: 2 Glasses of wine per week    Drug use: Never    Sexual activity: Yes     Partners: Male     Birth control/protection: See Surgical Hx      Family History   Problem Relation Age of Onset     Dementia Mother     Cancer Father        Previous Hospitalizations:  Childbirth, ORIF right ankle.    ROS:   Review of Systems   Constitution: Negative for chills and fever.   Eyes: Negative for blurred vision.   Cardiovascular: Negative for chest pain.   Respiratory: Negative for cough.    Endocrine: Negative for polydipsia.   Hematologic/Lymphatic: Negative for adenopathy.   Skin: Positive for rash.   Musculoskeletal: Positive for joint pain and joint swelling. Negative for gout.   Gastrointestinal: Positive for heartburn. Negative for constipation and diarrhea.   Genitourinary: Negative for nocturia.   Neurological: Negative for headaches and seizures.   Psychiatric/Behavioral: Negative for depression and substance abuse. The patient is not nervous/anxious.    Allergic/Immunologic: Positive for environmental allergies.           Objective:      Physical Exam:   General: AAOx3.  No acute distress  HEENT: Normocephalic, PEARLA EOMI, Good Dentition  Neck: Supple, No JVD  Chest: Symetric, equal excursion on inspiration  Abdomen: Soft NTND  Vascular:  Pulses intact and equal bilaterally.  Capillary refill less than 3 seconds and equal bilaterally  Neurologic:  Pinprick and soft touch intact and equal bilaterally  Integment:  No ecchymosis, no errythema  Extremity:  Knee:  Extension/flexion equal bilaterally 0/122 degrees. Mild effusion right knee. Crepitus with motion both knees.  Small Baker cyst right knee. Negative patellar load/compression both knees.  Negative patella apprehension/relocation both knees.  Varus/valgus stressing equal bilaterally with good endpoint.  Mild varus alignment both knees.  Lachman's/drawer equal bilaterally with endpoint.  Positive joint line tenderness right knee.  Meghan negative both knees.  Isabella positive right knee.  Nontender at the anserine insertion both knees.  No swelling at the anserine insertion both knees.  Radiography:  Personally reviewed x-rays of the right knee  completed on 12/13/2019 showed early tricompartmental arthritic changes with osteophytes.      Assessment:       Impression:      1. Degenerative tear of medial meniscus, right    2.  3. Primary osteoarthritis of right knee   Baker cyst, right knee.         Plan:       1.  Discussed physical examination and radiographic findings with the patient. Patrica understands that she has a degenerative tear of her medial meniscus and arthritis in her knee with conservative management she could improve and if she fails conservative care then she could consider surgical intervention.  2.  Offered a steroid injection to the right knee, she elected to proceed.  3.  Recommend the patient purchase a sleeve brace and wear it to see if it helps with support and decrease her effusion.  4.  Take NSAIDs as tolerated allowed by PCM.  5.  Home exercises to include quadriceps and hamstring strengthening were shown discussed.  6.  Discussed possible referral to physical therapy declined for now.  7.  [facility] portal was discussed with the patient and information was given.  The patient was encouraged to use the portal for future encounters.  8.  Follow

## 2019-12-20 ENCOUNTER — OFFICE VISIT (OUTPATIENT)
Dept: FAMILY MEDICINE | Facility: CLINIC | Age: 59
End: 2019-12-20
Payer: COMMERCIAL

## 2019-12-20 VITALS
SYSTOLIC BLOOD PRESSURE: 137 MMHG | RESPIRATION RATE: 18 BRPM | WEIGHT: 185 LBS | HEART RATE: 76 BPM | HEIGHT: 66 IN | OXYGEN SATURATION: 98 % | DIASTOLIC BLOOD PRESSURE: 80 MMHG | BODY MASS INDEX: 29.73 KG/M2

## 2019-12-20 DIAGNOSIS — R00.0 TACHYCARDIA: ICD-10-CM

## 2019-12-20 DIAGNOSIS — E55.9 VITAMIN D DEFICIENCY: ICD-10-CM

## 2019-12-20 DIAGNOSIS — E03.9 HYPOTHYROIDISM, UNSPECIFIED TYPE: Primary | ICD-10-CM

## 2019-12-20 DIAGNOSIS — I10 ESSENTIAL HYPERTENSION: ICD-10-CM

## 2019-12-20 PROCEDURE — 3075F SYST BP GE 130 - 139MM HG: CPT | Mod: S$GLB,,, | Performed by: NURSE PRACTITIONER

## 2019-12-20 PROCEDURE — 3079F DIAST BP 80-89 MM HG: CPT | Mod: S$GLB,,, | Performed by: NURSE PRACTITIONER

## 2019-12-20 PROCEDURE — 3079F PR MOST RECENT DIASTOLIC BLOOD PRESSURE 80-89 MM HG: ICD-10-PCS | Mod: S$GLB,,, | Performed by: NURSE PRACTITIONER

## 2019-12-20 PROCEDURE — 3008F PR BODY MASS INDEX (BMI) DOCUMENTED: ICD-10-PCS | Mod: S$GLB,,, | Performed by: NURSE PRACTITIONER

## 2019-12-20 PROCEDURE — 3075F PR MOST RECENT SYSTOLIC BLOOD PRESS GE 130-139MM HG: ICD-10-PCS | Mod: S$GLB,,, | Performed by: NURSE PRACTITIONER

## 2019-12-20 PROCEDURE — 99214 PR OFFICE/OUTPT VISIT, EST, LEVL IV, 30-39 MIN: ICD-10-PCS | Mod: S$GLB,,, | Performed by: NURSE PRACTITIONER

## 2019-12-20 PROCEDURE — 3008F BODY MASS INDEX DOCD: CPT | Mod: S$GLB,,, | Performed by: NURSE PRACTITIONER

## 2019-12-20 PROCEDURE — 99214 OFFICE O/P EST MOD 30 MIN: CPT | Mod: S$GLB,,, | Performed by: NURSE PRACTITIONER

## 2019-12-20 RX ORDER — METOPROLOL SUCCINATE 100 MG/1
100 TABLET, EXTENDED RELEASE ORAL DAILY
Qty: 90 TABLET | Refills: 3 | Status: SHIPPED | OUTPATIENT
Start: 2019-12-20 | End: 2020-06-12 | Stop reason: SDUPTHER

## 2019-12-20 RX ORDER — LEVOTHYROXINE SODIUM 137 UG/1
137 TABLET ORAL
Qty: 30 TABLET | Refills: 3 | Status: SHIPPED | OUTPATIENT
Start: 2019-12-20 | End: 2020-04-21 | Stop reason: SDUPTHER

## 2019-12-20 RX ORDER — AMLODIPINE BESYLATE 5 MG/1
5 TABLET ORAL DAILY
Qty: 90 TABLET | Refills: 3 | Status: SHIPPED | OUTPATIENT
Start: 2019-12-20 | End: 2020-06-12 | Stop reason: SDUPTHER

## 2019-12-20 NOTE — PROGRESS NOTES
Chief Complaint  Chief Complaint   Patient presents with    [facility]       HPI:  Patrica Donato is a 59 y.o. female with medical diagnoses as listed and reviewed within the medical history and problem list that presents to establish care.   History of hypertension controlled on amlodipine. She also takes metoprolol for tachycardia.   History of hypothyroid. Last 3 TSH are elevated. Currently taking 112 mcg and reports compliance. Has been unable to lose weight and feels sluggish with more headaches than normal. Used to take 137 mcg. Will increase dose and re-check in 4 weeks.   History of Arthritis.    PAST MEDICAL HISTORY:  Past Medical History:   Diagnosis Date    Arthritis     Hypertension     Thyroid disease        PAST SURGICAL HISTORY:  Past Surgical History:   Procedure Laterality Date    ANKLE SURGERY      APPENDECTOMY      CHOLECYSTECTOMY      HAND SURGERY      finger     HYSTERECTOMY      KNEE ARTHROSCOPY      LAPAROSCOPIC CHOLECYSTECTOMY N/A 10/2/2018    Procedure: CHOLECYSTECTOMY-LAPAROSCOPIC;  Surgeon: Ovidio Carrillo MD;  [facility];  Service: General;  Laterality: N/A;    TONSILLECTOMY         SOCIAL HISTORY:  Social History     Socioeconomic History    Marital status:      Spouse name: Not on file    Number of children: Not on file    Years of education: Not on file    Highest education level: Not on file   Occupational History    Not on file   Social Needs    Financial resource strain: Not on file    Food insecurity:     Worry: Not on file     Inability: Not on file    Transportation needs:     Medical: Not on file     Non-medical: Not on file   Tobacco Use    Smoking status: Former Smoker     Last attempt to quit: 2015     Years since quittin.9    Smokeless tobacco: Never Used   Substance and Sexual Activity    Alcohol use: Yes     Alcohol/week: 2.0 standard drinks     Types: 2 Glasses of wine per week    Drug use: Never     Sexual activity: Yes     Partners: Male     Birth control/protection: See Surgical Hx   Lifestyle    Physical activity:     Days per week: Not on file     Minutes per session: Not on file    Stress: Not on file   Relationships    Social connections:     Talks on phone: Not on file     Gets together: Not on file     Attends Hoahaoism service: Not on file     Active member of club or organization: Not on file     Attends meetings of clubs or organizations: Not on file     Relationship status: Not on file   Other Topics Concern    Not on file   Social History Narrative    Not on file       FAMILY HISTORY:  Family History   Problem Relation Age of Onset    Dementia Mother     Cancer Father        ALLERGIES AND MEDICATIONS: updated and reviewed.  Review of patient's allergies indicates:   Allergen Reactions    Cerave [ceramides 1,3,6-11]      Current Outpatient Medications   Medication Sig Dispense Refill    amLODIPine (NORVASC) 5 MG tablet Take 1 tablet (5 mg total) by mouth once daily. 90 tablet 3    CALCIUM CARBONATE/VITAMIN D3 (VITAMIN D-3 ORAL) Take by mouth.      levothyroxine (SYNTHROID) 137 MCG Tab tablet Take 1 tablet (137 mcg total) by mouth before breakfast. 30 tablet 3    metoprolol succinate (TOPROL-XL) 100 MG 24 hr tablet Take 1 tablet (100 mg total) by mouth once daily. 90 tablet 3    pravastatin (PRAVACHOL) 20 MG tablet Take 1 tablet (20 mg total) by mouth once daily. 90 tablet 3     No current facility-administered medications for this visit.          ROS  Review of Systems   Constitutional: Positive for fatigue. Negative for appetite change, chills and fever.   HENT: Negative for congestion, postnasal drip, rhinorrhea and sore throat.    Respiratory: Negative for cough, chest tightness, shortness of breath and wheezing.    Cardiovascular: Negative for chest pain and palpitations.   Gastrointestinal: Negative for abdominal distention, abdominal pain, constipation, diarrhea, nausea and  "vomiting.   Genitourinary: Negative for dysuria.   Musculoskeletal: Positive for arthralgias and myalgias.   Skin: Negative for color change and rash.   Neurological: Positive for headaches. Negative for dizziness and weakness.   Psychiatric/Behavioral: Negative for confusion and sleep disturbance. The patient is not nervous/anxious.            PHYSICAL EXAM  Vitals:    12/20/19 1307   BP: 137/80   BP Location: Right arm   Patient Position: Sitting   Pulse: 76   Resp: 18   SpO2: 98%   Weight: 83.9 kg (185 lb)   Height: 5' 6" (1.676 m)    Body mass index is 29.86 kg/m².  Weight: 83.9 kg (185 lb)   Height: 5' 6" (167.6 cm)       Physical Exam   Constitutional: She is oriented to person, place, and time. She appears well-developed and well-nourished.   HENT:   Head: Normocephalic.   Eyes: Pupils are equal, round, and reactive to light.   Neck: Normal range of motion. Neck supple.   Cardiovascular: Normal rate, regular rhythm, S1 normal and S2 normal.   No murmur heard.  Pulmonary/Chest: Effort normal and breath sounds normal. She has no wheezes.   Abdominal: Soft. Normal appearance and bowel sounds are normal. She exhibits no distension. There is no tenderness.   Musculoskeletal: Normal range of motion.   Neurological: She is alert and oriented to person, place, and time.   Skin: Skin is warm and dry. Capillary refill takes less than 2 seconds.   Psychiatric: She has a normal mood and affect. Her speech is normal and behavior is normal. Thought content normal. Cognition and memory are normal.   Vitals reviewed.        Health Maintenance       Date Due Completion Date    Hepatitis C Screening 1960 ---    TETANUS VACCINE 03/15/1977 ---    Mammogram 03/15/1999 ---    Shingles Vaccine (1 of 2) 03/15/2009 ---    Colonoscopy 03/15/2009 ---    Lipid Panel 12/13/2024 12/13/2019               Assessment & Plan    Patrica was seen today for establish care.    Diagnoses and all orders for this visit:    Hypothyroidism, " unspecified type  -     levothyroxine (SYNTHROID) 137 MCG Tab tablet; Take 1 tablet (137 mcg total) by mouth before breakfast.  -     TSH; Future  -     CBC auto differential; Future  -     Comprehensive metabolic panel; Future  -     Hemoglobin A1c; Future    Essential hypertension  -     amLODIPine (NORVASC) 5 MG tablet; Take 1 tablet (5 mg total) by mouth once daily.  -     CBC auto differential; Future  -     Comprehensive metabolic panel; Future  -     Hemoglobin A1c; Future    Tachycardia  -     metoprolol succinate (TOPROL-XL) 100 MG 24 hr tablet; Take 1 tablet (100 mg total) by mouth once daily.    Vitamin D deficiency  -     Vitamin D; Future        Follow-up: Follow up in about 4 weeks (around 1/17/2020).      Risks, benefits, and side effects were discussed with the patient. All questions were answered to the fullest satisfaction of the patient, and pt verbalized understanding and agreement to treatment plan. Pt was to call with any new or worsening symptoms, or present to the ER.

## 2019-12-26 DIAGNOSIS — Z12.11 COLON CANCER SCREENING: ICD-10-CM

## 2019-12-26 DIAGNOSIS — Z11.59 NEED FOR HEPATITIS C SCREENING TEST: ICD-10-CM

## 2020-02-07 DIAGNOSIS — D36.10 NEUROMA: Primary | ICD-10-CM

## 2020-02-07 RX ORDER — CELECOXIB 100 MG/1
100 CAPSULE ORAL DAILY
Qty: 30 CAPSULE | Refills: 5 | Status: SHIPPED | OUTPATIENT
Start: 2020-02-07 | End: 2020-03-08

## 2020-03-20 DIAGNOSIS — J32.9 SINUSITIS, UNSPECIFIED CHRONICITY, UNSPECIFIED LOCATION: Primary | ICD-10-CM

## 2020-03-20 RX ORDER — CETIRIZINE HYDROCHLORIDE, PSEUDOEPHEDRINE HYDROCHLORIDE 5; 120 MG/1; MG/1
1 TABLET, FILM COATED, EXTENDED RELEASE ORAL EVERY 12 HOURS
Qty: 60 TABLET | Refills: 0 | Status: SHIPPED | OUTPATIENT
Start: 2020-03-20 | End: 2020-03-30

## 2020-03-20 RX ORDER — AZITHROMYCIN 500 MG/1
500 TABLET, FILM COATED ORAL DAILY
Qty: 7 TABLET | Refills: 0 | Status: SHIPPED | OUTPATIENT
Start: 2020-03-20 | End: 2020-03-27

## 2020-04-21 DIAGNOSIS — E03.9 HYPOTHYROIDISM, UNSPECIFIED TYPE: ICD-10-CM

## 2020-04-21 DIAGNOSIS — Z01.84 ANTIBODY RESPONSE EXAMINATION: ICD-10-CM

## 2020-04-21 RX ORDER — LEVOTHYROXINE SODIUM 137 UG/1
137 TABLET ORAL
Qty: 90 TABLET | Refills: 3 | Status: SHIPPED | OUTPATIENT
Start: 2020-04-21 | End: 2020-07-06 | Stop reason: SDUPTHER

## 2020-05-21 DIAGNOSIS — Z01.84 ANTIBODY RESPONSE EXAMINATION: ICD-10-CM

## 2020-06-01 DIAGNOSIS — Z12.39 BREAST CANCER SCREENING: ICD-10-CM

## 2020-06-12 DIAGNOSIS — I10 ESSENTIAL HYPERTENSION: ICD-10-CM

## 2020-06-12 DIAGNOSIS — R00.0 TACHYCARDIA: ICD-10-CM

## 2020-06-12 DIAGNOSIS — E78.5 HYPERLIPIDEMIA, UNSPECIFIED HYPERLIPIDEMIA TYPE: ICD-10-CM

## 2020-06-12 RX ORDER — PRAVASTATIN SODIUM 20 MG/1
20 TABLET ORAL DAILY
Qty: 90 TABLET | Refills: 3 | Status: SHIPPED | OUTPATIENT
Start: 2020-06-12 | End: 2021-07-06

## 2020-06-12 RX ORDER — METOPROLOL SUCCINATE 100 MG/1
100 TABLET, EXTENDED RELEASE ORAL DAILY
Qty: 90 TABLET | Refills: 3 | Status: SHIPPED | OUTPATIENT
Start: 2020-06-12 | End: 2021-07-06

## 2020-06-12 RX ORDER — AMLODIPINE BESYLATE 5 MG/1
5 TABLET ORAL DAILY
Qty: 90 TABLET | Refills: 3 | Status: SHIPPED | OUTPATIENT
Start: 2020-06-12 | End: 2021-07-06

## 2020-06-20 DIAGNOSIS — Z01.84 ANTIBODY RESPONSE EXAMINATION: ICD-10-CM

## 2020-06-29 ENCOUNTER — PATIENT OUTREACH (OUTPATIENT)
Dept: ADMINISTRATIVE | Facility: HOSPITAL | Age: 60
End: 2020-06-29

## 2020-06-29 NOTE — PROGRESS NOTES
Population Health Outreach.  Following up with pt about the Fit Kit mailed a week ago. She stated she did get and is planning on doing it. Educated her on how to use the Fit Kit. Verbalized understanding at this time.

## 2020-07-06 DIAGNOSIS — E03.9 HYPOTHYROIDISM, UNSPECIFIED TYPE: ICD-10-CM

## 2020-07-06 RX ORDER — LEVOTHYROXINE SODIUM 137 UG/1
137 TABLET ORAL
Qty: 90 TABLET | Refills: 3 | Status: SHIPPED | OUTPATIENT
Start: 2020-07-06 | End: 2021-07-06

## 2020-07-14 ENCOUNTER — LAB VISIT (OUTPATIENT)
Dept: LAB | Facility: HOSPITAL | Age: 60
End: 2020-07-14
Attending: NURSE PRACTITIONER
Payer: COMMERCIAL

## 2020-07-14 DIAGNOSIS — E03.9 HYPOTHYROIDISM, UNSPECIFIED TYPE: ICD-10-CM

## 2020-07-14 LAB
BASOPHILS # BLD AUTO: 0.02 K/UL (ref 0–0.2)
BASOPHILS NFR BLD: 0.4 % (ref 0–1.9)
DIFFERENTIAL METHOD: NORMAL
EOSINOPHIL # BLD AUTO: 0.1 K/UL (ref 0–0.5)
EOSINOPHIL NFR BLD: 2.4 % (ref 0–8)
ERYTHROCYTE [DISTWIDTH] IN BLOOD BY AUTOMATED COUNT: 13.6 % (ref 11.5–14.5)
HCT VFR BLD AUTO: 39.1 % (ref 37–48.5)
HGB BLD-MCNC: 12.7 G/DL (ref 12–16)
IMM GRANULOCYTES # BLD AUTO: 0.02 K/UL (ref 0–0.04)
IMM GRANULOCYTES NFR BLD AUTO: 0.4 % (ref 0–0.5)
LYMPHOCYTES # BLD AUTO: 2.3 K/UL (ref 1–4.8)
LYMPHOCYTES NFR BLD: 45.3 % (ref 18–48)
MCH RBC QN AUTO: 29.7 PG (ref 27–31)
MCHC RBC AUTO-ENTMCNC: 32.5 G/DL (ref 32–36)
MCV RBC AUTO: 91 FL (ref 82–98)
MONOCYTES # BLD AUTO: 0.4 K/UL (ref 0.3–1)
MONOCYTES NFR BLD: 8.5 % (ref 4–15)
NEUTROPHILS # BLD AUTO: 2.2 K/UL (ref 1.8–7.7)
NEUTROPHILS NFR BLD: 43 % (ref 38–73)
NRBC BLD-RTO: 0 /100 WBC
PLATELET # BLD AUTO: 217 K/UL (ref 150–350)
PMV BLD AUTO: 9.8 FL (ref 9.2–12.9)
RBC # BLD AUTO: 4.28 M/UL (ref 4–5.4)
TSH SERPL DL<=0.005 MIU/L-ACNC: 0.42 UIU/ML (ref 0.34–5.6)
WBC # BLD AUTO: 5.05 K/UL (ref 3.9–12.7)

## 2020-07-14 PROCEDURE — 85025 COMPLETE CBC W/AUTO DIFF WBC: CPT

## 2020-07-14 PROCEDURE — 84443 ASSAY THYROID STIM HORMONE: CPT

## 2020-07-14 PROCEDURE — 36415 COLL VENOUS BLD VENIPUNCTURE: CPT

## 2020-07-20 DIAGNOSIS — Z01.84 ANTIBODY RESPONSE EXAMINATION: ICD-10-CM

## 2020-08-19 DIAGNOSIS — Z01.84 ANTIBODY RESPONSE EXAMINATION: ICD-10-CM

## 2020-08-24 ENCOUNTER — LAB VISIT (OUTPATIENT)
Dept: URGENT CARE | Facility: CLINIC | Age: 60
End: 2020-08-24
Payer: COMMERCIAL

## 2020-08-24 ENCOUNTER — TELEPHONE (OUTPATIENT)
Dept: PRIMARY CARE CLINIC | Facility: OTHER | Age: 60
End: 2020-08-24

## 2020-08-24 DIAGNOSIS — R50.9 LOW GRADE FEVER: ICD-10-CM

## 2020-08-24 DIAGNOSIS — R51.9 COMPLAINT OF HEADACHE: ICD-10-CM

## 2020-08-24 DIAGNOSIS — J02.9 SORE THROAT: ICD-10-CM

## 2020-08-24 DIAGNOSIS — J02.9 SORE THROAT: Primary | ICD-10-CM

## 2020-08-24 DIAGNOSIS — M54.9 BACK PAIN, UNSPECIFIED BACK LOCATION, UNSPECIFIED BACK PAIN LATERALITY, UNSPECIFIED CHRONICITY: ICD-10-CM

## 2020-08-24 LAB
CTP QC/QA: YES
SARS-COV-2 RDRP RESP QL NAA+PROBE: NEGATIVE

## 2020-08-24 PROCEDURE — U0002: ICD-10-PCS | Mod: S$GLB,,, | Performed by: INTERNAL MEDICINE

## 2020-08-24 PROCEDURE — U0002 COVID-19 LAB TEST NON-CDC: HCPCS | Mod: S$GLB,,, | Performed by: INTERNAL MEDICINE

## 2020-08-31 ENCOUNTER — OFFICE VISIT (OUTPATIENT)
Dept: SURGERY | Facility: CLINIC | Age: 60
End: 2020-08-31
Payer: COMMERCIAL

## 2020-08-31 VITALS
OXYGEN SATURATION: 96 % | SYSTOLIC BLOOD PRESSURE: 191 MMHG | BODY MASS INDEX: 30.33 KG/M2 | HEIGHT: 66 IN | RESPIRATION RATE: 16 BRPM | WEIGHT: 188.69 LBS | HEART RATE: 104 BPM | DIASTOLIC BLOOD PRESSURE: 112 MMHG | TEMPERATURE: 98 F

## 2020-08-31 DIAGNOSIS — K21.9 GASTROESOPHAGEAL REFLUX DISEASE, ESOPHAGITIS PRESENCE NOT SPECIFIED: ICD-10-CM

## 2020-08-31 DIAGNOSIS — Z12.11 ENCOUNTER FOR SCREENING COLONOSCOPY: Primary | ICD-10-CM

## 2020-08-31 DIAGNOSIS — R10.84 GENERALIZED ABDOMINAL PAIN: ICD-10-CM

## 2020-08-31 PROCEDURE — 3077F SYST BP >= 140 MM HG: CPT | Mod: S$GLB,,, | Performed by: SURGERY

## 2020-08-31 PROCEDURE — 3008F BODY MASS INDEX DOCD: CPT | Mod: S$GLB,,, | Performed by: SURGERY

## 2020-08-31 PROCEDURE — 3077F PR MOST RECENT SYSTOLIC BLOOD PRESSURE >= 140 MM HG: ICD-10-PCS | Mod: S$GLB,,, | Performed by: SURGERY

## 2020-08-31 PROCEDURE — 3008F PR BODY MASS INDEX (BMI) DOCUMENTED: ICD-10-PCS | Mod: S$GLB,,, | Performed by: SURGERY

## 2020-08-31 PROCEDURE — 3080F PR MOST RECENT DIASTOLIC BLOOD PRESSURE >= 90 MM HG: ICD-10-PCS | Mod: S$GLB,,, | Performed by: SURGERY

## 2020-08-31 PROCEDURE — 3080F DIAST BP >= 90 MM HG: CPT | Mod: S$GLB,,, | Performed by: SURGERY

## 2020-08-31 PROCEDURE — 99213 PR OFFICE/OUTPT VISIT, EST, LEVL III, 20-29 MIN: ICD-10-PCS | Mod: S$GLB,,, | Performed by: SURGERY

## 2020-08-31 PROCEDURE — 99213 OFFICE O/P EST LOW 20 MIN: CPT | Mod: S$GLB,,, | Performed by: SURGERY

## 2020-08-31 RX ORDER — LIDOCAINE HYDROCHLORIDE 10 MG/ML
1 INJECTION, SOLUTION EPIDURAL; INFILTRATION; INTRACAUDAL; PERINEURAL ONCE
Status: CANCELLED | OUTPATIENT
Start: 2020-08-31 | End: 2020-08-31

## 2020-08-31 RX ORDER — BACLOFEN 10 MG/1
10 TABLET ORAL 3 TIMES DAILY
Qty: 90 TABLET | Refills: 0 | Status: ON HOLD | OUTPATIENT
Start: 2020-08-31 | End: 2021-09-15

## 2020-08-31 RX ORDER — SODIUM CHLORIDE, SODIUM LACTATE, POTASSIUM CHLORIDE, CALCIUM CHLORIDE 600; 310; 30; 20 MG/100ML; MG/100ML; MG/100ML; MG/100ML
INJECTION, SOLUTION INTRAVENOUS CONTINUOUS
Status: CANCELLED | OUTPATIENT
Start: 2020-08-31

## 2020-08-31 RX ORDER — SODIUM, POTASSIUM,MAG SULFATES 17.5-3.13G
SOLUTION, RECONSTITUTED, ORAL ORAL
Qty: 2 BOTTLE | Refills: 0 | Status: ON HOLD | OUTPATIENT
Start: 2020-08-31 | End: 2020-09-24 | Stop reason: HOSPADM

## 2020-08-31 RX ORDER — PANTOPRAZOLE SODIUM 40 MG/1
40 TABLET, DELAYED RELEASE ORAL DAILY
Qty: 30 TABLET | Refills: 11 | Status: ON HOLD | OUTPATIENT
Start: 2020-08-31 | End: 2021-09-15

## 2020-08-31 NOTE — PROGRESS NOTES
Subjective:       Patient ID: Patrica Donato     Chief Complaint:  Follow-up (stomach issues)      HPI:  Ms. Donato presents today with a 2 month history of right upper quadrant abdominal pain that radiates through to her thoracolumbar back.  She is also experiencing epigastric burning pain that radiates retrosternally with regurgitation of bitter fluid.  No dysphagia.  No weight loss.  No fever or chills.  No coughing or choking.  No melena, hematochezia, hematemesis.  No change in bowel habits or stool characteristics.  No weight loss.  No other associated symptoms.  She is 11 years overdue for a screening colonoscopy.  No family history of colon cancer.  She has a history of hypothyroidism.  Most recent laboratory studies indicate adequate thyroid replacement therapy.      Allergies & Meds:  Review of patient's allergies indicates:   Allergen Reactions    Cerave [ceramides 1,3,6-11]        Current Outpatient Medications   Medication Sig Dispense Refill    amLODIPine (NORVASC) 5 MG tablet Take 1 tablet (5 mg total) by mouth once daily. 90 tablet 3    CALCIUM CARBONATE/VITAMIN D3 (VITAMIN D-3 ORAL) Take by mouth.      levothyroxine (SYNTHROID) 137 MCG Tab tablet Take 1 tablet (137 mcg total) by mouth before breakfast. 90 tablet 3    metoprolol succinate (TOPROL-XL) 100 MG 24 hr tablet Take 1 tablet (100 mg total) by mouth once daily. 90 tablet 3    pravastatin (PRAVACHOL) 20 MG tablet Take 1 tablet (20 mg total) by mouth once daily. 90 tablet 3    sodium,potassium,mag sulfates (SUPREP BOWEL PREP KIT) 17.5-3.13-1.6 gram SolR Follow written instructions provided by Clinic 2 Bottle 0     No current facility-administered medications for this visit.        PMFSHx:  Past Medical History:   Diagnosis Date    Arthritis     Hypertension     Thyroid disease        Past Surgical History:   Procedure Laterality Date    ANKLE SURGERY  2000    APPENDECTOMY  2015    CHOLECYSTECTOMY      HAND SURGERY  2012     finger     HYSTERECTOMY      KNEE ARTHROSCOPY      LAPAROSCOPIC CHOLECYSTECTOMY N/A 10/2/2018    Procedure: CHOLECYSTECTOMY-LAPAROSCOPIC;  Surgeon: Ovidio Carrillo MD;  Location: [facility] OR;  Service: General;  Laterality: N/A;    TONSILLECTOMY  1965       Family History   Problem Relation Age of Onset    Dementia Mother     Cancer Father        Social History     Tobacco Use    Smoking status: Former Smoker     Quit date: 2015     Years since quittin.6    Smokeless tobacco: Never Used   Substance Use Topics    Alcohol use: Yes     Alcohol/week: 2.0 standard drinks     Types: 2 Glasses of wine per week    Drug use: Never       Review of Systems   Constitutional: Negative for appetite change, chills, fatigue, fever and unexpected weight change.   HENT: Negative for congestion, dental problem, ear pain, mouth sores, postnasal drip, rhinorrhea, sore throat, tinnitus, trouble swallowing and voice change.    Eyes: Negative for photophobia, pain, discharge and visual disturbance.   Respiratory: Negative for cough, chest tightness, shortness of breath and wheezing.    Cardiovascular: Negative for chest pain, palpitations and leg swelling.   Gastrointestinal: Negative for abdominal distention, anal bleeding, blood in stool, constipation, diarrhea, nausea, rectal pain and vomiting.   Endocrine: Negative for cold intolerance, heat intolerance, polydipsia, polyphagia and polyuria.   Genitourinary: Negative for difficulty urinating, dysuria, flank pain, frequency, hematuria and urgency.   Musculoskeletal: Negative for arthralgias, joint swelling and myalgias.   Skin: Negative for color change and rash.   Allergic/Immunologic: Negative for immunocompromised state.   Neurological: Negative for dizziness, tremors, seizures, syncope, speech difficulty, weakness, numbness and headaches.   Hematological: Negative for adenopathy. Does not bruise/bleed easily.   Psychiatric/Behavioral: Negative for agitation,  confusion, hallucinations, self-injury and suicidal ideas. The patient is not nervous/anxious.             Physical Exam  Constitutional:       General: She is not in acute distress.     Appearance: Normal appearance. She is well-developed. She is not toxic-appearing.      Comments: Body mass index is 30.46 kg/m².     HENT:      Head: Normocephalic and atraumatic.      Right Ear: Hearing and external ear normal. No drainage or tenderness.      Left Ear: Hearing and external ear normal. No drainage or tenderness.      Nose: Nose normal. No rhinorrhea.      Mouth/Throat:      Mouth: Mucous membranes are not pale, not dry and not cyanotic.      Pharynx: Uvula midline. No oropharyngeal exudate.   Eyes:      General: Lids are normal.         Right eye: No discharge.         Left eye: No discharge.      Extraocular Movements:      Right eye: No nystagmus.      Left eye: No nystagmus.      Conjunctiva/sclera: Conjunctivae normal.      Right eye: Right conjunctiva is not injected. No exudate.     Left eye: No exudate.     Pupils: Pupils are equal, round, and reactive to light.   Neck:      Musculoskeletal: Full passive range of motion without pain.      Thyroid: No thyroid mass or thyromegaly.      Vascular: No carotid bruit or JVD.      Trachea: Trachea and phonation normal. No tracheal deviation.   Cardiovascular:      Rate and Rhythm: Normal rate and regular rhythm.      Chest Wall: PMI is not displaced.      Heart sounds: Normal heart sounds, S1 normal and S2 normal. No murmur. No friction rub. No gallop.    Pulmonary:      Effort: Pulmonary effort is normal. No accessory muscle usage or respiratory distress.      Breath sounds: Normal breath sounds.   Chest:      Chest wall: No mass, tenderness or crepitus.      Breasts: Breasts are symmetrical.         Right: No inverted nipple, mass, nipple discharge, skin change or tenderness.         Left: No inverted nipple, mass, nipple discharge, skin change or tenderness.    Abdominal:      General: Bowel sounds are normal. There is no distension or abdominal bruit.      Palpations: Abdomen is soft. There is no fluid wave or mass.      Tenderness: There is no abdominal tenderness. There is no guarding or rebound. Negative signs include Rust's sign.      Hernia: No hernia is present. There is no hernia in the left inguinal area.   Genitourinary:     Labia:         Right: No rash or lesion.         Left: No rash or lesion.       Vagina: Normal. No vaginal discharge.      Adnexa:         Right: No mass.          Left: No mass.        Rectum: Normal.   Musculoskeletal:      Cervical back: Normal.      Thoracic back: Normal.      Lumbar back: Normal.      Right upper arm: Normal.      Left upper arm: Normal.      Right forearm: Normal.      Left forearm: Normal.      Right hand: Normal.      Left hand: Normal.      Right upper leg: Normal.      Left upper leg: Normal.      Right lower leg: Normal.      Left lower leg: Normal.      Right foot: Normal.      Left foot: Normal.   Lymphadenopathy:      Head:      Right side of head: No submental, submandibular or posterior auricular adenopathy.      Left side of head: No submental, submandibular or posterior auricular adenopathy.      Cervical: No cervical adenopathy.      Upper Body:      Right upper body: No supraclavicular adenopathy.      Left upper body: No supraclavicular adenopathy.   Skin:     General: Skin is warm and dry.      Findings: No rash.      Nails: There is no clubbing.     Neurological:      Mental Status: She is alert and oriented to person, place, and time.      GCS: GCS eye subscore is 4. GCS verbal subscore is 5. GCS motor subscore is 6.      Cranial Nerves: No cranial nerve deficit.      Sensory: No sensory deficit.      Coordination: Coordination normal.      Gait: Gait normal.   Psychiatric:         Mood and Affect: Mood is not anxious or depressed. Affect is not inappropriate.         Speech: Speech normal.          Thought Content: Thought content normal.         Judgment: Judgment normal.             Medical Records Review:  Pending    Assessment:       Multifocal/generalized abdominal pain.  Gastroesophageal reflux disease.  Due for screening colonoscopy.  Right upper quadrant abdominal pain.  Previous cholecystectomy.  Differential diagnosis includes but is not limited to esophageal neoplasm, esophagitis, esophageal ulcer, gastroesophageal reflux disease, gastritis, gastric ulcer, gastric cancer, duodenitis, duodenal ulcer, inflammatory bowel disease, diverticulosis, hemorrhoids, gastrointestinal angiodysplasias, benign gastrointestinal neoplasm, colon cancer, etc.  Options at this time include but are not limited to endoscopy, second opinion, capsule endoscopy, radiologic studies, observation, etc.      1. Generalized abdominal pain    2. Gastroesophageal reflux disease, esophagitis presence not specified    3. Encounter for screening colonoscopy          Plan:     Generalized abdominal pain  -     CBC auto differential; Future; Expected date: 08/31/2020  -     Comprehensive metabolic panel; Future; Expected date: 08/31/2020  -     Amylase; Future; Expected date: 08/31/2020  -     Lipase; Future; Expected date: 08/31/2020  -     Sedimentation rate; Future; Expected date: 08/31/2020  -     C-Reactive Protein; Future; Expected date: 08/31/2020  -     CT Abdomen Pelvis With Contrast; Future; Expected date: 08/31/2020    Gastroesophageal reflux disease, esophagitis presence not specified    Encounter for screening colonoscopy  -     Case Request Operating Room: COLONOSCOPY - screening, high risk screening, or diagnostic.  (See H&P), ESOPHAGOGASTRODUODENOSCOPY (EGD)  -     EKG 12-lead; Future; Expected date: 09/21/2020  -     COVID-19 Routine Screening    Other orders  -     sodium,potassium,mag sulfates (SUPREP BOWEL PREP KIT) 17.5-3.13-1.6 gram SolR; Follow written instructions provided by Clinic  Dispense: 2 Bottle; Refill:  0        Follow up in about 1 month (around 9/30/2020) for routine post-endosocpy visit.    Counseling/Medical Decision Making:  Patrica Donato was counseled regarding the results of the evaluation thus far and the differential diagnosis.  Diagnostic and therapeutic options were discussed in detail along with the risks, benefits, possible complications, details of, and indications for each option.  Diagnoses discussed included but were not limited to: GB disease, GERD, hiatal hernia, PUD, gastritis, duodenitis, Sphincter of Oddi dysfunction, hemorrhoids, diverticulosis, cancer, IBD, IBS, benign tumors, angiodysplasias, ischemic disease.  Options discussed included but were not limited to: EGD, colonoscopy, proctoscopy, anoscopy, sigmoidoscopy, radiologic studies, PillCam, empiric therapy, second opinion, etc. Possible complications of endoscopy discussed included but were not limited to: bleeding, infections, endocarditis, injury to internal organs, incomplete exam, failure to diagnose cancer or other serious condition, need for emergency surgery, need for a temporary colostomy, need for additional operations or procedures, etc.  Entire conversation was held in layman's terms.  All unfamiliar terms were defined.  Questions solicited and answered.  I read the consent form to her verbatim.  [facility] booklets were provided on EGD, GERD, GB disease / surgery, colonoscopy, polyps, diverticulosis, hemorrhoids, cancer, etc.  A copy of the consent form was provided for her review outside the office / hospital prior to the procedures.  At the conclusion of the conversation she voiced complete understanding of all we discussed and satisfaction that all of her questions had been answered to her full understanding.  She stated that she felt fully informed and completely capable of making an informed decision.  She requests and desires to proceed with EGD and colonoscopy.    Total face-to-face encounter time was 60 minutes, 40  minutes spent counseling as outlined/summarized above.

## 2020-08-31 NOTE — H&P
[facility] - General Surgery  General Surgery  H&P      Patient Name: Patrica Donato  MRN: [id number]     Chief Complaint:  I am here for a screening/wellness colonoscopy and an EGD      HPI:  Ms. Donato presents today to proceed with a screening colonoscopy and a EGD.  She was seen in the Surgery Clinic on 8/31/2020  with a 2 month history of right upper quadrant abdominal pain that radiates through to her thoracolumbar back.  She is also experiencing epigastric burning pain that radiates retrosternally with regurgitation of bitter fluid.  No dysphagia.  No weight loss.  No fever or chills.  No coughing or choking.  No melena, hematochezia, hematemesis.  No change in bowel habits or stool characteristics.  No weight loss.  No other associated symptoms.  She is 11 years overdue for a screening colonoscopy.  No family history of colon cancer.  She has a history of hypothyroidism.  Most recent laboratory studies indicate adequate thyroid replacement therapy.      Allergies & Meds:     Allergen Reactions    Cerave [ceramides 1,3,6-11]        Current Outpatient Medications   Medication Sig Dispense Refill    amLODIPine (NORVASC) 5 MG tablet Take 1 tablet (5 mg total) by mouth once daily. 90 tablet 3    CALCIUM CARBONATE/VITAMIN D3 (VITAMIN D-3 ORAL) Take by mouth.      levothyroxine (SYNTHROID) 137 MCG Tab tablet Take 1 tablet (137 mcg total) by mouth before breakfast. 90 tablet 3    metoprolol succinate (TOPROL-XL) 100 MG 24 hr tablet Take 1 tablet (100 mg total) by mouth once daily. 90 tablet 3    pravastatin (PRAVACHOL) 20 MG tablet Take 1 tablet (20 mg total) by mouth once daily. 90 tablet 3    sodium,potassium,mag sulfates (SUPREP BOWEL PREP KIT) 17.5-3.13-1.6 gram SolR Follow written instructions provided by Clinic 2 Bottle 0         PMFSHx:  Past Medical History:   Diagnosis Date    Arthritis     Hypertension     Thyroid disease        Past Surgical History:   Procedure  Laterality Date    ANKLE SURGERY      APPENDECTOMY      CHOLECYSTECTOMY      HAND SURGERY      finger     HYSTERECTOMY      KNEE ARTHROSCOPY      LAPAROSCOPIC CHOLECYSTECTOMY N/A 10/2/2018    Procedure: CHOLECYSTECTOMY-LAPAROSCOPIC;  Surgeon: Ovidio Carrillo MD;  Location: [facility];  Service: General;  Laterality: N/A;    TONSILLECTOMY  1965       Family History   Problem Relation Age of Onset    Dementia Mother     Cancer Father        Social History     Tobacco Use    Smoking status: Former Smoker     Quit date: 2015     Years since quittin.6    Smokeless tobacco: Never Used   Substance Use Topics    Alcohol use: Yes     Alcohol/week: 2.0 standard drinks     Types: 2 Glasses of wine per week    Drug use: Never       Review of Systems   Constitutional: Negative for appetite change, chills, fatigue, fever and unexpected weight change.   HENT: Negative for congestion, dental problem, ear pain, mouth sores, postnasal drip, rhinorrhea, sore throat, tinnitus, trouble swallowing and voice change.    Eyes: Negative for photophobia, pain, discharge and visual disturbance.   Respiratory: Negative for cough, chest tightness, shortness of breath and wheezing.    Cardiovascular: Negative for chest pain, palpitations and leg swelling.   Gastrointestinal: Negative for abdominal distention, anal bleeding, blood in stool, constipation, diarrhea, nausea, rectal pain and vomiting.   Endocrine: Negative for cold intolerance, heat intolerance, polydipsia, polyphagia and polyuria.   Genitourinary: Negative for difficulty urinating, dysuria, flank pain, frequency, hematuria and urgency.   Musculoskeletal: Negative for arthralgias, joint swelling and myalgias.   Skin: Negative for color change and rash.   Allergic/Immunologic: Negative for immunocompromised state.   Neurological: Negative for dizziness, tremors, seizures, syncope, speech difficulty, weakness, numbness and headaches.   Hematological:  Negative for adenopathy. Does not bruise/bleed easily.   Psychiatric/Behavioral: Negative for agitation, confusion, hallucinations, self-injury and suicidal ideas. The patient is not nervous/anxious.             Physical Exam  Constitutional:       General: She is not in acute distress.     Appearance: Normal appearance. She is well-developed. She is not toxic-appearing.      Comments: Body mass index is 30.46 kg/m².   HENT:      Head: Normocephalic and atraumatic.      Right Ear: Hearing and external ear normal. No drainage or tenderness.      Left Ear: Hearing and external ear normal. No drainage or tenderness.      Nose: Nose normal. No rhinorrhea.      Mouth/Throat:      Mouth: Mucous membranes are not pale, not dry and not cyanotic.      Pharynx: Uvula midline. No oropharyngeal exudate.   Eyes:      General: Lids are normal.         Right eye: No discharge.         Left eye: No discharge.      Extraocular Movements:      Right eye: No nystagmus.      Left eye: No nystagmus.      Conjunctiva/sclera: Conjunctivae normal.      Right eye: Right conjunctiva is not injected. No exudate.     Left eye: No exudate.     Pupils: Pupils are equal, round, and reactive to light.   Neck:      Musculoskeletal: Full passive range of motion without pain.      Thyroid: No thyroid mass or thyromegaly.      Vascular: No carotid bruit or JVD.      Trachea: Trachea and phonation normal. No tracheal deviation.   Cardiovascular:      Rate and Rhythm: Normal rate and regular rhythm.      Chest Wall: PMI is not displaced.      Heart sounds: Normal heart sounds, S1 normal and S2 normal. No murmur. No friction rub. No gallop.    Pulmonary:      Effort: Pulmonary effort is normal. No accessory muscle usage or respiratory distress.      Breath sounds: Normal breath sounds.   Chest:      Chest wall: No mass, tenderness or crepitus.      Breasts: Breasts are symmetrical.         Right: No inverted nipple, mass, nipple discharge, skin change or  tenderness.         Left: No inverted nipple, mass, nipple discharge, skin change or tenderness.   Abdominal:      General: Bowel sounds are normal. There is no distension or abdominal bruit.      Palpations: Abdomen is soft. There is no fluid wave or mass.      Tenderness: There is no abdominal tenderness. There is no guarding or rebound. Negative signs include Rust's sign.      Hernia: No hernia is present. There is no hernia in the left inguinal area.   Genitourinary:     Labia:         Right: No rash or lesion.         Left: No rash or lesion.       Vagina: Normal. No vaginal discharge.      Adnexa:         Right: No mass.          Left: No mass.        Rectum: Normal.   Musculoskeletal:      Cervical back: Normal.      Thoracic back: Normal.      Lumbar back: Normal.      Right upper arm: Normal.      Left upper arm: Normal.      Right forearm: Normal.      Left forearm: Normal.      Right hand: Normal.      Left hand: Normal.      Right upper leg: Normal.      Left upper leg: Normal.      Right lower leg: Normal.      Left lower leg: Normal.      Right foot: Normal.      Left foot: Normal.   Lymphadenopathy:      Head:      Right side of head: No submental, submandibular or posterior auricular adenopathy.      Left side of head: No submental, submandibular or posterior auricular adenopathy.      Cervical: No cervical adenopathy.      Upper Body:      Right upper body: No supraclavicular adenopathy.      Left upper body: No supraclavicular adenopathy.   Skin:     General: Skin is warm and dry.      Findings: No rash.      Nails: There is no clubbing.   Neurological:      Mental Status: She is alert and oriented to person, place, and time.      GCS: GCS eye subscore is 4. GCS verbal subscore is 5. GCS motor subscore is 6.      Cranial Nerves: No cranial nerve deficit.      Sensory: No sensory deficit.      Coordination: Coordination normal.      Gait: Gait normal.   Psychiatric:         Mood and Affect: Mood  is not anxious or depressed. Affect is not inappropriate.         Speech: Speech normal.         Thought Content: Thought content normal.         Judgment: Judgment normal.             Medical Records Review:  Pending    Assessment:       Multifocal/generalized abdominal pain.  Gastroesophageal reflux disease.  Due for screening colonoscopy.  Right upper quadrant abdominal pain.  Previous cholecystectomy.  Differential diagnosis includes but is not limited to esophageal neoplasm, esophagitis, esophageal ulcer, gastroesophageal reflux disease, gastritis, gastric ulcer, gastric cancer, duodenitis, duodenal ulcer, inflammatory bowel disease, diverticulosis, hemorrhoids, gastrointestinal angiodysplasias, benign gastrointestinal neoplasm, colon cancer, etc.  Options at this time include but are not limited to endoscopy, second opinion, capsule endoscopy, radiologic studies, observation, etc.      1. Generalized abdominal pain    2. Gastroesophageal reflux disease, esophagitis presence not specified    3. Encounter for screening colonoscopy          Plan:     Generalized abdominal pain  -     CBC auto differential; Future; Expected date: 08/31/2020  -     Comprehensive metabolic panel; Future; Expected date: 08/31/2020  -     Amylase; Future; Expected date: 08/31/2020  -     Lipase; Future; Expected date: 08/31/2020  -     Sedimentation rate; Future; Expected date: 08/31/2020  -     C-Reactive Protein; Future; Expected date: 08/31/2020  -     CT Abdomen Pelvis With Contrast; Future; Expected date: 08/31/2020    Gastroesophageal reflux disease, esophagitis presence not specified    Encounter for screening colonoscopy  -     Case Request Operating Room: COLONOSCOPY - screening, high risk screening, or diagnostic.  (See H&P), ESOPHAGOGASTRODUODENOSCOPY (EGD)  -     EKG 12-lead; Future; Expected date: 09/21/2020  -     COVID-19 Routine Screening    Other orders  -     sodium,potassium,mag sulfates (SUPREP BOWEL PREP KIT)  17.5-3.13-1.6 gram SolR; Follow written instructions provided by Clinic  Dispense: 2 Bottle; Refill: 0        Follow up around 9/30/2020 for routine post-endosocpy visit.    Counseling/Medical Decision Making:  Patrica Donato was counseled regarding the results of the evaluation thus far and the differential diagnosis.  Diagnostic and therapeutic options were discussed in detail along with the risks, benefits, possible complications, details of, and indications for each option.  Diagnoses discussed included but were not limited to: GB disease, GERD, hiatal hernia, PUD, gastritis, duodenitis, Sphincter of Oddi dysfunction, hemorrhoids, diverticulosis, cancer, IBD, IBS, benign tumors, angiodysplasias, ischemic disease.  Options discussed included but were not limited to: EGD, colonoscopy, proctoscopy, anoscopy, sigmoidoscopy, radiologic studies, PillCam, empiric therapy, second opinion, etc. Possible complications of endoscopy discussed included but were not limited to: bleeding, infections, endocarditis, injury to internal organs, incomplete exam, failure to diagnose cancer or other serious condition, need for emergency surgery, need for a temporary colostomy, need for additional operations or procedures, etc.  Entire conversation was held in layman's terms.  All unfamiliar terms were defined.  Questions solicited and answered.  I read the consent form to her verbatim.  [facility] booklets were provided on EGD, GERD, GB disease / surgery, colonoscopy, polyps, diverticulosis, hemorrhoids, cancer, etc.  A copy of the consent form was provided for her review outside the office / hospital prior to the procedures.  At the conclusion of the conversation she voiced complete understanding of all we discussed and satisfaction that all of her questions had been answered to her full understanding.  She stated that she felt fully informed and completely capable of making an informed decision.  She requests and desires to proceed  with EGD and colonoscopy.

## 2020-09-02 ENCOUNTER — HOSPITAL ENCOUNTER (OUTPATIENT)
Facility: HOSPITAL | Age: 60
Discharge: HOME | End: 2020-09-05
Attending: FAMILY MEDICINE | Admitting: FAMILY MEDICINE
Payer: COMMERCIAL

## 2020-09-02 ENCOUNTER — OFFICE VISIT (OUTPATIENT)
Dept: SURGERY | Facility: CLINIC | Age: 60
End: 2020-09-02
Payer: COMMERCIAL

## 2020-09-02 VITALS
HEIGHT: 66 IN | OXYGEN SATURATION: 96 % | RESPIRATION RATE: 16 BRPM | SYSTOLIC BLOOD PRESSURE: 175 MMHG | DIASTOLIC BLOOD PRESSURE: 87 MMHG | TEMPERATURE: 98 F | BODY MASS INDEX: 30.22 KG/M2 | HEART RATE: 84 BPM | WEIGHT: 188 LBS

## 2020-09-02 DIAGNOSIS — R10.9 LEFT FLANK PAIN: ICD-10-CM

## 2020-09-02 DIAGNOSIS — K57.92 DIVERTICULITIS: ICD-10-CM

## 2020-09-02 DIAGNOSIS — R10.9 RIGHT SIDED ABDOMINAL PAIN: Primary | ICD-10-CM

## 2020-09-02 DIAGNOSIS — R10.32 LEFT LOWER QUADRANT PAIN: Primary | ICD-10-CM

## 2020-09-02 DIAGNOSIS — Z01.810 PREOP CARDIOVASCULAR EXAM: ICD-10-CM

## 2020-09-02 PROBLEM — M54.50 ACUTE RIGHT-SIDED LOW BACK PAIN WITHOUT SCIATICA: Status: ACTIVE | Noted: 2020-09-02

## 2020-09-02 LAB
ALBUMIN SERPL BCP-MCNC: 4.8 G/DL (ref 3.5–5.2)
ALP SERPL-CCNC: 84 U/L (ref 55–135)
ALT SERPL W/O P-5'-P-CCNC: 30 U/L (ref 10–44)
ANION GAP SERPL CALC-SCNC: 12 MMOL/L (ref 8–16)
AST SERPL-CCNC: 21 U/L (ref 10–40)
BASOPHILS # BLD AUTO: 0.02 K/UL (ref 0–0.2)
BASOPHILS NFR BLD: 0.3 % (ref 0–1.9)
BILIRUB SERPL-MCNC: 0.8 MG/DL (ref 0.1–1)
BILIRUB UR QL STRIP: NEGATIVE
BUN SERPL-MCNC: 12 MG/DL (ref 8–23)
CALCIUM SERPL-MCNC: 9.6 MG/DL (ref 8.7–10.5)
CHLORIDE SERPL-SCNC: 103 MMOL/L (ref 95–110)
CLARITY UR: CLEAR
CO2 SERPL-SCNC: 24 MMOL/L (ref 23–29)
COLOR UR: YELLOW
CREAT SERPL-MCNC: 0.6 MG/DL (ref 0.5–1.4)
CRP SERPL-MCNC: 0.27 MG/DL (ref 0–0.75)
DIFFERENTIAL METHOD: NORMAL
EOSINOPHIL # BLD AUTO: 0.1 K/UL (ref 0–0.5)
EOSINOPHIL NFR BLD: 2.1 % (ref 0–8)
ERYTHROCYTE [DISTWIDTH] IN BLOOD BY AUTOMATED COUNT: 12.8 % (ref 11.5–14.5)
ERYTHROCYTE [SEDIMENTATION RATE] IN BLOOD BY WESTERGREN METHOD: 10 MM/HR (ref 0–20)
EST. GFR  (AFRICAN AMERICAN): >60 ML/MIN/1.73 M^2
EST. GFR  (NON AFRICAN AMERICAN): >60 ML/MIN/1.73 M^2
GLUCOSE SERPL-MCNC: 94 MG/DL (ref 70–110)
GLUCOSE UR QL STRIP: NEGATIVE
HCT VFR BLD AUTO: 41.4 % (ref 37–48.5)
HGB BLD-MCNC: 13.8 G/DL (ref 12–16)
HGB UR QL STRIP: ABNORMAL
IMM GRANULOCYTES # BLD AUTO: 0.01 K/UL (ref 0–0.04)
IMM GRANULOCYTES NFR BLD AUTO: 0.2 % (ref 0–0.5)
KETONES UR QL STRIP: NEGATIVE
LEUKOCYTE ESTERASE UR QL STRIP: NEGATIVE
LIPASE SERPL-CCNC: 22 U/L (ref 4–60)
LYMPHOCYTES # BLD AUTO: 1.9 K/UL (ref 1–4.8)
LYMPHOCYTES NFR BLD: 33.7 % (ref 18–48)
MCH RBC QN AUTO: 29.9 PG (ref 27–31)
MCHC RBC AUTO-ENTMCNC: 33.3 G/DL (ref 32–36)
MCV RBC AUTO: 90 FL (ref 82–98)
MONOCYTES # BLD AUTO: 0.4 K/UL (ref 0.3–1)
MONOCYTES NFR BLD: 6.4 % (ref 4–15)
NEUTROPHILS # BLD AUTO: 3.3 K/UL (ref 1.8–7.7)
NEUTROPHILS NFR BLD: 57.3 % (ref 38–73)
NITRITE UR QL STRIP: NEGATIVE
NRBC BLD-RTO: 0 /100 WBC
PH UR STRIP: 6 [PH] (ref 5–8)
PLATELET # BLD AUTO: 229 K/UL (ref 150–350)
PMV BLD AUTO: 9.7 FL (ref 9.2–12.9)
POTASSIUM SERPL-SCNC: 3.6 MMOL/L (ref 3.5–5.1)
PROT SERPL-MCNC: 7.7 G/DL (ref 6–8.4)
PROT UR QL STRIP: NEGATIVE
RBC # BLD AUTO: 4.61 M/UL (ref 4–5.4)
SARS-COV-2 RDRP RESP QL NAA+PROBE: NEGATIVE
SODIUM SERPL-SCNC: 139 MMOL/L (ref 136–145)
SP GR UR STRIP: 1.02 (ref 1–1.03)
URN SPEC COLLECT METH UR: ABNORMAL
UROBILINOGEN UR STRIP-ACNC: NEGATIVE EU/DL
WBC # BLD AUTO: 5.76 K/UL (ref 3.9–12.7)

## 2020-09-02 PROCEDURE — U0002 COVID-19 LAB TEST NON-CDC: HCPCS

## 2020-09-02 PROCEDURE — 3008F PR BODY MASS INDEX (BMI) DOCUMENTED: ICD-10-PCS | Mod: S$GLB,,, | Performed by: SURGERY

## 2020-09-02 PROCEDURE — 74177 CT ABDOMEN PELVIS WITH CONTRAST: ICD-10-PCS | Mod: 26,,, | Performed by: RADIOLOGY

## 2020-09-02 PROCEDURE — 3077F PR MOST RECENT SYSTOLIC BLOOD PRESSURE >= 140 MM HG: ICD-10-PCS | Mod: S$GLB,,, | Performed by: SURGERY

## 2020-09-02 PROCEDURE — S0030 INJECTION, METRONIDAZOLE: HCPCS | Performed by: FAMILY MEDICINE

## 2020-09-02 PROCEDURE — 74177 CT ABD & PELVIS W/CONTRAST: CPT | Mod: TC

## 2020-09-02 PROCEDURE — 99285 EMERGENCY DEPT VISIT HI MDM: CPT | Mod: 25

## 2020-09-02 PROCEDURE — 85025 COMPLETE CBC W/AUTO DIFF WBC: CPT

## 2020-09-02 PROCEDURE — 3079F PR MOST RECENT DIASTOLIC BLOOD PRESSURE 80-89 MM HG: ICD-10-PCS | Mod: S$GLB,,, | Performed by: SURGERY

## 2020-09-02 PROCEDURE — 99219 PR INITIAL OBSERVATION CARE,LEVL II: ICD-10-PCS | Mod: ,,, | Performed by: FAMILY MEDICINE

## 2020-09-02 PROCEDURE — 3079F DIAST BP 80-89 MM HG: CPT | Mod: S$GLB,,, | Performed by: SURGERY

## 2020-09-02 PROCEDURE — 74177 CT ABD & PELVIS W/CONTRAST: CPT | Mod: 26,,, | Performed by: RADIOLOGY

## 2020-09-02 PROCEDURE — 71045 X-RAY EXAM CHEST 1 VIEW: CPT | Mod: TC,FY

## 2020-09-02 PROCEDURE — 99215 OFFICE O/P EST HI 40 MIN: CPT | Mod: S$GLB,,, | Performed by: SURGERY

## 2020-09-02 PROCEDURE — G0378 HOSPITAL OBSERVATION PER HR: HCPCS

## 2020-09-02 PROCEDURE — 25500020 PHARM REV CODE 255: Performed by: FAMILY MEDICINE

## 2020-09-02 PROCEDURE — 25000003 PHARM REV CODE 250: Performed by: FAMILY MEDICINE

## 2020-09-02 PROCEDURE — 99215 PR OFFICE/OUTPT VISIT, EST, LEVL V, 40-54 MIN: ICD-10-PCS | Mod: S$GLB,,, | Performed by: SURGERY

## 2020-09-02 PROCEDURE — 96365 THER/PROPH/DIAG IV INF INIT: CPT

## 2020-09-02 PROCEDURE — 3077F SYST BP >= 140 MM HG: CPT | Mod: S$GLB,,, | Performed by: SURGERY

## 2020-09-02 PROCEDURE — 63600175 PHARM REV CODE 636 W HCPCS: Performed by: SURGERY

## 2020-09-02 PROCEDURE — 96375 TX/PRO/DX INJ NEW DRUG ADDON: CPT

## 2020-09-02 PROCEDURE — 83690 ASSAY OF LIPASE: CPT

## 2020-09-02 PROCEDURE — 36415 COLL VENOUS BLD VENIPUNCTURE: CPT

## 2020-09-02 PROCEDURE — 85651 RBC SED RATE NONAUTOMATED: CPT

## 2020-09-02 PROCEDURE — 86140 C-REACTIVE PROTEIN: CPT

## 2020-09-02 PROCEDURE — 71045 X-RAY EXAM CHEST 1 VIEW: CPT | Mod: 26,,, | Performed by: RADIOLOGY

## 2020-09-02 PROCEDURE — 96361 HYDRATE IV INFUSION ADD-ON: CPT

## 2020-09-02 PROCEDURE — 71045 XR CHEST 1 VIEW: ICD-10-PCS | Mod: 26,,, | Performed by: RADIOLOGY

## 2020-09-02 PROCEDURE — 25000003 PHARM REV CODE 250: Performed by: SURGERY

## 2020-09-02 PROCEDURE — 81003 URINALYSIS AUTO W/O SCOPE: CPT

## 2020-09-02 PROCEDURE — 93005 ELECTROCARDIOGRAM TRACING: CPT

## 2020-09-02 PROCEDURE — A9698 NON-RAD CONTRAST MATERIALNOC: HCPCS | Performed by: FAMILY MEDICINE

## 2020-09-02 PROCEDURE — 80053 COMPREHEN METABOLIC PANEL: CPT

## 2020-09-02 PROCEDURE — 99219 PR INITIAL OBSERVATION CARE,LEVL II: CPT | Mod: ,,, | Performed by: FAMILY MEDICINE

## 2020-09-02 PROCEDURE — 3008F BODY MASS INDEX DOCD: CPT | Mod: S$GLB,,, | Performed by: SURGERY

## 2020-09-02 RX ORDER — HYDROCODONE BITARTRATE AND ACETAMINOPHEN 10; 325 MG/1; MG/1
TABLET ORAL
Status: COMPLETED
Start: 2020-09-02 | End: 2020-09-02

## 2020-09-02 RX ORDER — ONDANSETRON 2 MG/ML
4 INJECTION INTRAMUSCULAR; INTRAVENOUS EVERY 6 HOURS PRN
Status: DISCONTINUED | OUTPATIENT
Start: 2020-09-02 | End: 2020-09-05 | Stop reason: HOSPADM

## 2020-09-02 RX ORDER — ACETAMINOPHEN 325 MG/1
650 TABLET ORAL EVERY 4 HOURS PRN
Status: DISCONTINUED | OUTPATIENT
Start: 2020-09-02 | End: 2020-09-05 | Stop reason: HOSPADM

## 2020-09-02 RX ORDER — ONDANSETRON 2 MG/ML
4 INJECTION INTRAMUSCULAR; INTRAVENOUS EVERY 6 HOURS PRN
Status: CANCELLED | OUTPATIENT
Start: 2020-09-02

## 2020-09-02 RX ORDER — MORPHINE SULFATE 4 MG/ML
2 INJECTION, SOLUTION INTRAMUSCULAR; INTRAVENOUS
Status: DISCONTINUED | OUTPATIENT
Start: 2020-09-02 | End: 2020-09-03

## 2020-09-02 RX ORDER — METRONIDAZOLE 500 MG/100ML
500 INJECTION, SOLUTION INTRAVENOUS
Status: DISCONTINUED | OUTPATIENT
Start: 2020-09-02 | End: 2020-09-05 | Stop reason: HOSPADM

## 2020-09-02 RX ORDER — HYDROCODONE BITARTRATE AND ACETAMINOPHEN 10; 325 MG/1; MG/1
2 TABLET ORAL EVERY 4 HOURS PRN
Status: DISCONTINUED | OUTPATIENT
Start: 2020-09-02 | End: 2020-09-03

## 2020-09-02 RX ORDER — MORPHINE SULFATE 4 MG/ML
4 INJECTION, SOLUTION INTRAMUSCULAR; INTRAVENOUS
Status: DISCONTINUED | OUTPATIENT
Start: 2020-09-02 | End: 2020-09-05 | Stop reason: HOSPADM

## 2020-09-02 RX ORDER — METOPROLOL SUCCINATE 25 MG/1
100 TABLET, EXTENDED RELEASE ORAL DAILY
Status: DISCONTINUED | OUTPATIENT
Start: 2020-09-03 | End: 2020-09-05 | Stop reason: HOSPADM

## 2020-09-02 RX ORDER — SODIUM CHLORIDE 9 MG/ML
INJECTION, SOLUTION INTRAVENOUS CONTINUOUS
Status: DISCONTINUED | OUTPATIENT
Start: 2020-09-02 | End: 2020-09-05

## 2020-09-02 RX ORDER — AMLODIPINE BESYLATE 2.5 MG/1
5 TABLET ORAL DAILY
Status: DISCONTINUED | OUTPATIENT
Start: 2020-09-03 | End: 2020-09-05 | Stop reason: HOSPADM

## 2020-09-02 RX ORDER — MORPHINE SULFATE 10 MG/ML
2 INJECTION, SOLUTION INTRAMUSCULAR; INTRAVENOUS
Status: CANCELLED | OUTPATIENT
Start: 2020-09-02

## 2020-09-02 RX ORDER — MORPHINE SULFATE 10 MG/ML
4 INJECTION, SOLUTION INTRAMUSCULAR; INTRAVENOUS
Status: CANCELLED | OUTPATIENT
Start: 2020-09-02

## 2020-09-02 RX ORDER — HYDROCODONE BITARTRATE AND ACETAMINOPHEN 10; 325 MG/1; MG/1
1 TABLET ORAL EVERY 4 HOURS PRN
Status: DISCONTINUED | OUTPATIENT
Start: 2020-09-02 | End: 2020-09-05 | Stop reason: HOSPADM

## 2020-09-02 RX ORDER — METRONIDAZOLE 500 MG/100ML
500 INJECTION, SOLUTION INTRAVENOUS
Status: CANCELLED | OUTPATIENT
Start: 2020-09-02

## 2020-09-02 RX ORDER — METRONIDAZOLE 500 MG/100ML
500 INJECTION, SOLUTION INTRAVENOUS
Status: DISCONTINUED | OUTPATIENT
Start: 2020-09-02 | End: 2020-09-02

## 2020-09-02 RX ORDER — CIPROFLOXACIN 2 MG/ML
400 INJECTION, SOLUTION INTRAVENOUS
Status: DISCONTINUED | OUTPATIENT
Start: 2020-09-02 | End: 2020-09-05 | Stop reason: HOSPADM

## 2020-09-02 RX ORDER — SODIUM CHLORIDE, SODIUM LACTATE, POTASSIUM CHLORIDE, CALCIUM CHLORIDE 600; 310; 30; 20 MG/100ML; MG/100ML; MG/100ML; MG/100ML
INJECTION, SOLUTION INTRAVENOUS CONTINUOUS
Status: CANCELLED | OUTPATIENT
Start: 2020-09-02

## 2020-09-02 RX ORDER — PANTOPRAZOLE SODIUM 40 MG/1
40 TABLET, DELAYED RELEASE ORAL DAILY
Status: DISCONTINUED | OUTPATIENT
Start: 2020-09-03 | End: 2020-09-05 | Stop reason: HOSPADM

## 2020-09-02 RX ORDER — ACETAMINOPHEN 325 MG/1
650 TABLET ORAL EVERY 4 HOURS PRN
Status: CANCELLED | OUTPATIENT
Start: 2020-09-02

## 2020-09-02 RX ORDER — BACLOFEN 10 MG/1
10 TABLET ORAL 3 TIMES DAILY
Status: DISCONTINUED | OUTPATIENT
Start: 2020-09-02 | End: 2020-09-05 | Stop reason: HOSPADM

## 2020-09-02 RX ORDER — SODIUM CHLORIDE, SODIUM LACTATE, POTASSIUM CHLORIDE, CALCIUM CHLORIDE 600; 310; 30; 20 MG/100ML; MG/100ML; MG/100ML; MG/100ML
INJECTION, SOLUTION INTRAVENOUS CONTINUOUS
Status: DISCONTINUED | OUTPATIENT
Start: 2020-09-02 | End: 2020-09-02

## 2020-09-02 RX ADMIN — METRONIDAZOLE 500 MG: 500 INJECTION, SOLUTION INTRAVENOUS at 07:09

## 2020-09-02 RX ADMIN — HYDROCODONE BITARTRATE AND ACETAMINOPHEN 1 TABLET: 10; 325 TABLET ORAL at 09:09

## 2020-09-02 RX ADMIN — BACLOFEN 10 MG: 10 TABLET ORAL at 09:09

## 2020-09-02 RX ADMIN — CIPROFLOXACIN 400 MG: 2 INJECTION, SOLUTION INTRAVENOUS at 04:09

## 2020-09-02 RX ADMIN — IOHEXOL 1000 ML: 9 SOLUTION ORAL at 06:09

## 2020-09-02 RX ADMIN — HYDROCODONE BITARTRATE AND ACETAMINOPHEN 1 TABLET: 10; 325 TABLET ORAL at 11:09

## 2020-09-02 RX ADMIN — IOHEXOL 75 ML: 350 INJECTION, SOLUTION INTRAVENOUS at 06:09

## 2020-09-02 RX ADMIN — SODIUM CHLORIDE: 0.9 INJECTION, SOLUTION INTRAVENOUS at 04:09

## 2020-09-02 RX ADMIN — MORPHINE SULFATE 2 MG: 4 INJECTION INTRAVENOUS at 04:09

## 2020-09-02 NOTE — HPI
Patient is a 60-year-old female with past medical history of arthritis, hypertension, hypothyroid who presents as a direct admit from general surgery clinic for left lower quadrant abdominal pain.  Patient reports that for the last 2 weeks she has had intermittent abdominal pain.  States that is periumbilical and higher but is also been in the left lower quadrant and in the right lower quadrant.  She has had an appendectomy and cholecystectomy.  Reports a 1 day history of diarrhea but otherwise no changes in bowel movements including hematochezia or melena.  Denies any fevers.  Denies any nausea or vomiting.  She also reports acute onset of right lower back pain just above the hip.  Pain worsens with movement but she can make it go away if she gets into a certain position or stay still.  Worse with bending.  She received a Toradol shot which she thought helped for 1 day but then the pain came back and was much worse which prompted her to seek care.  She was seen by Dr. Carrillo who called hospital team for admission.  We greatly appreciate the opportunity to be involved in this case.

## 2020-09-02 NOTE — ED PROVIDER NOTES
Please note that my documentation in this Electronic Healthcare Record was produced using speech recognition software and therefore may contain errors related to that software.These could include grammar, punctuation and spelling errors or the inclusion/ exclusion of phrases that were not intended. Please contact myself for any clarification, questions or concerns.    HPI: Patient is a 60 y.o. female who presents with the chief complaint of sent by Dr. Carrillo for admission.  Patient states she was sent for expedited CT imaging of her abdomen.  Complains of right lower abdominal pain for about 3-4 weeks now.  Has had a couple episodes of diarrhea last evening.  Denies any nausea, vomiting, fever chills, urinary complaints.  Does have some right lower back pain but denies any dysuria, urgency, frequency, saddle anesthesia, extremity weakness.  Slight numbness on the proximal thighs bilaterally.  Has not taken much medication for her symptoms.  Known history of a cholecystectomy and appendectomy.  History of hypertension and arthritis.    REVIEW OF SYSTEMS - 10 systems were independently reviewed and are otherwise negative with the exception of those items previously documented in the HPI and nursing notes.    Allergy: Cerave [ceramides 1,3,6-11]    Past medical history:   Past Medical History:   Diagnosis Date    Arthritis     Hypertension     Thyroid disease        Surgical History:   Past Surgical History:   Procedure Laterality Date    ANKLE SURGERY  2000    APPENDECTOMY  2015    CHOLECYSTECTOMY      HAND SURGERY  2012    finger     HYSTERECTOMY      KNEE ARTHROSCOPY  2011    LAPAROSCOPIC CHOLECYSTECTOMY N/A 10/2/2018    Procedure: CHOLECYSTECTOMY-LAPAROSCOPIC;  Surgeon: Ovidio Carrillo MD;  Location: [facility];  Service: General;  Laterality: N/A;    TONSILLECTOMY  1965       Social history:   Social History     Socioeconomic History    Marital status:      Spouse name: Not on file    Number  "of children: Not on file    Years of education: Not on file    Highest education level: Not on file   Occupational History    Not on file   Social Needs    Financial resource strain: Not on file    Food insecurity     Worry: Not on file     Inability: Not on file    Transportation needs     Medical: Not on file     Non-medical: Not on file   Tobacco Use    Smoking status: Former Smoker     Quit date: 2015     Years since quittin.6    Smokeless tobacco: Never Used   Substance and Sexual Activity    Alcohol use: Yes     Alcohol/week: 2.0 standard drinks     Types: 2 Glasses of wine per week    Drug use: Never    Sexual activity: Yes     Partners: Male     Birth control/protection: See Surgical Hx   Lifestyle    Physical activity     Days per week: Not on file     Minutes per session: Not on file    Stress: Not on file   Relationships    Social connections     Talks on phone: Not on file     Gets together: Not on file     Attends Hinduism service: Not on file     Active member of club or organization: Not on file     Attends meetings of clubs or organizations: Not on file     Relationship status: Not on file   Other Topics Concern    Not on file   Social History Narrative    Not on file       Family history: non-contributory    EHR: reviewed    Vitals: BP (!) 170/96 (BP Location: Left arm, Patient Position: Sitting)   Pulse 90   Temp 98.6 °F (37 °C)   Resp 20   Ht 5' 6" (1.676 m)   Wt 84 kg (185 lb 3 oz)   LMP  (LMP Unknown)   SpO2 98%   BMI 29.89 kg/m²     PHYSICAL EXAM:    General-59 yo female awake and alert, oriented, GCS 15, in no acute distress,  HEENT- normocephalic, atraumatic, sclera anicteric, moist mucous membranes, PERRL, EOMI  CARDIOVASCULAR- regular rate and rhythm  PULMONARY- nonlabored, no respiratory distress  GASTROINTESTINAL- soft, right periumbilical mild tenderness to palpation, nondistended, no rigidity, rebound, or guarding, no CVA tenderness  NEUROLOGIC- mental " status normal, speech fluid, cognition normal, CN II-XII grossly intact, sensations equal normal bilateral upper and lower extremities, peripheral pulse 2 +/4, ambulatory with proper gait.  MUSCULOSKELETAL- well-nourished, well-developed  DERMATOLOGIC- warm and dry, no visible rashes  PSYCHIATRIC- normal affect, normal concentration           Labs Reviewed   SARS-COV-2 RNA AMPLIFICATION, QUAL       No orders to display       MEDICAL DECISION MAKING: Patient is a 60 y.o. female who presented with chief complaint of being sent for direct admission.  Patient thinks she might have pyelonephritis or diverticulitis.  Has been having some intermittent abdominal pain with some right lower back pain.  No history of cholecystectomy and appendectomy.  Couple episodes of diarrhea but no nausea or vomiting, fever chills, chest pain, difficulty breathing, urinary complaints.  Was seen by a general surgeon Dr. Carrillo and was sent here for admission without an actual diagnosis or workup.  On exam, patient does have some right-sided periumbilical tenderness to palpation.  No peritoneal signs.  Tenderness palpation the right lower back but no point midline tenderness.  Orders have already been placed for admission prior to the patient's arrival.  I was told we only need to perform a COVID swab a and then the patient will be directly admitted under Dr. Iglesias.  COVID swab negative.  I did speak with Dr. Iglesias who is aware the patient.    CLINICAL IMPRESSION:  1. Right sided abdominal pain         BONILLA Frederick  09/02/20 8331

## 2020-09-02 NOTE — SUBJECTIVE & OBJECTIVE
Past Medical History:   Diagnosis Date    Arthritis     Hypertension     Thyroid disease        Past Surgical History:   Procedure Laterality Date    ANKLE SURGERY      APPENDECTOMY      CHOLECYSTECTOMY      HAND SURGERY      finger     HYSTERECTOMY      KNEE ARTHROSCOPY      LAPAROSCOPIC CHOLECYSTECTOMY N/A 10/2/2018    Procedure: CHOLECYSTECTOMY-LAPAROSCOPIC;  Surgeon: Ovidio Carrillo MD;  Location: [facility];  Service: General;  Laterality: N/A;    TONSILLECTOMY  1965       Review of patient's allergies indicates:   Allergen Reactions    Cerave [ceramides 1,3,6-11]        No current facility-administered medications on file prior to encounter.      Current Outpatient Medications on File Prior to Encounter   Medication Sig    amLODIPine (NORVASC) 5 MG tablet Take 1 tablet (5 mg total) by mouth once daily.    baclofen (LIORESAL) 10 MG tablet Take 1 tablet (10 mg total) by mouth 3 (three) times daily.    CALCIUM CARBONATE/VITAMIN D3 (VITAMIN D-3 ORAL) Take by mouth.    levothyroxine (SYNTHROID) 137 MCG Tab tablet Take 1 tablet (137 mcg total) by mouth before breakfast.    metoprolol succinate (TOPROL-XL) 100 MG 24 hr tablet Take 1 tablet (100 mg total) by mouth once daily.    pantoprazole (PROTONIX) 40 MG tablet Take 1 tablet (40 mg total) by mouth once daily. Take in the morning before breakfast.  Wait 30 minutes before eating or drinking anything    pravastatin (PRAVACHOL) 20 MG tablet Take 1 tablet (20 mg total) by mouth once daily.    sodium,potassium,mag sulfates (SUPREP BOWEL PREP KIT) 17.5-3.13-1.6 gram SolR Follow written instructions provided by Clinic     Family History     Problem Relation (Age of Onset)    Cancer Father    Dementia Mother        Tobacco Use    Smoking status: Former Smoker     Quit date: 2015     Years since quittin.6    Smokeless tobacco: Never Used   Substance and Sexual Activity    Alcohol use: Yes     Alcohol/week: 2.0 standard drinks      Types: 2 Glasses of wine per week    Drug use: Never    Sexual activity: Yes     Partners: Male     Birth control/protection: See Surgical Hx     Review of Systems   Constitutional: Negative for activity change, appetite change, chills, diaphoresis, fatigue and fever.   HENT: Negative.    Eyes: Negative for visual disturbance.   Respiratory: Negative for shortness of breath.    Cardiovascular: Negative for chest pain.   Gastrointestinal: Positive for abdominal pain and diarrhea (x1 episode). Negative for blood in stool, constipation, nausea and vomiting.   Endocrine: Negative.    Genitourinary: Positive for flank pain and pelvic pain. Negative for decreased urine volume, difficulty urinating, dysuria, frequency and menstrual problem.   Musculoskeletal: Positive for back pain. Negative for gait problem.   Skin: Negative for rash and wound.   Neurological: Negative for dizziness, syncope, weakness, light-headedness and headaches.   Hematological: Negative.    Psychiatric/Behavioral: Negative.      Objective:     Vital Signs (Most Recent):  Temp: 96.6 °F (35.9 °C) (09/02/20 1555)  Pulse: 74 (09/02/20 1555)  Resp: 16 (09/02/20 1653)  BP: (!) 167/83 (09/02/20 1555)  SpO2: 96 % (09/02/20 1555) Vital Signs (24h Range):  Temp:  [96.6 °F (35.9 °C)-98.6 °F (37 °C)] 96.6 °F (35.9 °C)  Pulse:  [74-90] 74  Resp:  [16-20] 16  SpO2:  [96 %-98 %] 96 %  BP: (167-175)/(83-96) 167/83     Weight: 86.6 kg (191 lb)  Body mass index is 30.83 kg/m².    Physical Exam  Vitals signs reviewed.   Constitutional:       General: She is not in acute distress.     Appearance: Normal appearance. She is normal weight. She is not ill-appearing or toxic-appearing.   HENT:      Head: Normocephalic and atraumatic.      Right Ear: External ear normal.      Left Ear: External ear normal.      Nose: Nose normal.      Mouth/Throat:      Mouth: Mucous membranes are moist.   Eyes:      Conjunctiva/sclera: Conjunctivae normal.   Cardiovascular:      Rate and  Rhythm: Normal rate and regular rhythm.      Pulses: Normal pulses.      Heart sounds: No murmur. No gallop.    Pulmonary:      Effort: Pulmonary effort is normal. No respiratory distress.      Breath sounds: Normal breath sounds. No wheezing or rales.   Abdominal:      General: There is no distension.      Tenderness: There is right CVA tenderness (very mild). There is no left CVA tenderness.      Comments: Periumbilical, midepigastric and RLQ TTP. Bowel sounds present   Skin:     General: Skin is warm and dry.      Findings: No bruising or erythema.   Neurological:      Mental Status: She is alert and oriented to person, place, and time. Mental status is at baseline.   Psychiatric:         Mood and Affect: Mood normal.         Behavior: Behavior normal.             Significant Labs:   Recent Lab Results       09/02/20  1645   09/02/20  1600   09/02/20  1449        Albumin 4.8         Alkaline Phosphatase 84         ALT 30         Anion Gap 12         Appearance, UA   Clear       AST 21         Baso # 0.02         Basophil% 0.3         Bilirubin (UA)   Negative       BILIRUBIN TOTAL 0.8  Comment:  For infants and newborns, interpretation of results should be based  on gestational age, weight and in agreement with clinical  observations.  Premature Infant recommended reference ranges:  Up to 24 hours.............<8.0 mg/dL  Up to 48 hours............<12.0 mg/dL  3-5 days..................<15.0 mg/dL  6-29 days.................<15.0 mg/dL           BUN, Bld 12         Calcium 9.6         Chloride 103         CO2 24         Color, UA   Yellow       Creatinine 0.6         CRP 0.27         Differential Method Automated         eGFR if  >60.0         eGFR if non  >60.0  Comment:  Calculation used to obtain the estimated glomerular filtration  rate (eGFR) is the CKD-EPI equation.            Eos # 0.1         Eosinophil% 2.1         Glucose 94         Glucose, UA   Negative       Gran #  (ANC) 3.3         Gran% 57.3         Hematocrit 41.4         Hemoglobin 13.8         Immature Grans (Abs) 0.01  Comment:  Mild elevation in immature granulocytes is non specific and   can be seen in a variety of conditions including stress response,   acute inflammation, trauma and pregnancy. Correlation with other   laboratory and clinical findings is essential.           Immature Granulocytes 0.2         Ketones, UA   Negative       Leukocytes, UA   Negative       Lipase 22         Lymph # 1.9         Lymph% 33.7         MCH 29.9         MCHC 33.3         MCV 90         Mono # 0.4         Mono% 6.4         MPV 9.7         NITRITE UA   Negative       nRBC 0         Occult Blood UA   Trace       pH, UA   6.0       Platelets 229         Potassium 3.6         PROTEIN TOTAL 7.7         Protein, UA   Negative  Comment:  Recommend a 24 hour urine protein or a urine   protein/creatinine ratio if globulin induced proteinuria is  clinically suspected.         RBC 4.61         RDW 12.8         SARS-CoV-2 RNA, Amplification, Qual     Negative  Comment:  This test utilizes isothermal nucleic acid amplification   technology to detect the SARS-CoV-2 RdRp nucleic acid segment.   The analytical sensitivity (limit of detection) is 125 genome   equivalents/mL.   A POSITIVE result implies infection with the SARS-CoV-2 virus;  the patient is presumed to be contagious.    A NEGATIVE result means that SARS-CoV-2 nucleic acids are not  present above the limit of detection. A NEGATIVE result should be   treated as presumptive. It does not rule out the possibility of   COVID-19 and should not be the sole basis for treatment decisions.   If COVID-19 is strongly suspected based on clinical and exposure   history, re-testing using an alternate molecular assay should be   considered.   This test is only for use under the Food and Drug   Administration s Emergency Use Authorization (EUA).   Commercial kits are provided by [employer].    Performance characteristics of the EUA have been independently  verified by [facility] Department of  Pathology and Laboratory Medicine.   _________________________________________________________________  The ID NOW COVID-19 Letter of Authorization, along with the   authorized Fact Sheet for Healthcare Providers, the authorized Fact  Sheet for Patients, and authorized labeling are available on the FDA   website:  www.fda.gov/MedicalDevices/Safety/EmergencySituations/dvw485044.htm       Sed Rate 10         Sodium 139         Specific Gravity, UA   1.020       Specimen UA   Urine, Clean Catch       UROBILINOGEN UA   Negative       WBC 5.76             All pertinent labs within the past 24 hours have been reviewed.    Significant Imaging: I have reviewed all pertinent imaging results/findings within the past 24 hours.

## 2020-09-02 NOTE — PROGRESS NOTES
Subjective:       Patient ID: Patrica Donato     Chief Complaint:  Follow-up (abd pain)      HPI:  Ms. Donato returns today with left lower quadrant and left flank pain.  She was seen in the clinic 2 or 3 days ago with right upper quadrant pain that radiated through to her thoracolumbar back and epigastric burning pain that radiated retrosternally with regurgitation of bitter fluid.  Protonix therapy was reinstituted.  The symptoms have resolved.  Last night she had 2 large watery bowel movements and noted lower abdominal pain in the suprapubic region.  Pain subsequently migrated to the left lower quadrant and flank.  No nausea or vomiting.  No constipation.  No melena or hematochezia.  No fever or chills.  No dysuria, urgency, frequency, hesitancy, nocturia, hematuria.  No other associated symptoms.  No aggravating or alleviating factors.  No other associated symptoms.      Allergies & Meds:  Review of patient's allergies indicates:   Allergen Reactions    Cerave [ceramides 1,3,6-11]        Current Outpatient Medications   Medication Sig Dispense Refill    amLODIPine (NORVASC) 5 MG tablet Take 1 tablet (5 mg total) by mouth once daily. 90 tablet 3    baclofen (LIORESAL) 10 MG tablet Take 1 tablet (10 mg total) by mouth 3 (three) times daily. 90 tablet 0    CALCIUM CARBONATE/VITAMIN D3 (VITAMIN D-3 ORAL) Take by mouth.      levothyroxine (SYNTHROID) 137 MCG Tab tablet Take 1 tablet (137 mcg total) by mouth before breakfast. 90 tablet 3    metoprolol succinate (TOPROL-XL) 100 MG 24 hr tablet Take 1 tablet (100 mg total) by mouth once daily. 90 tablet 3    pantoprazole (PROTONIX) 40 MG tablet Take 1 tablet (40 mg total) by mouth once daily. Take in the morning before breakfast.  Wait 30 minutes before eating or drinking anything 30 tablet 11    pravastatin (PRAVACHOL) 20 MG tablet Take 1 tablet (20 mg total) by mouth once daily. 90 tablet 3    sodium,potassium,mag sulfates (SUPREP BOWEL PREP KIT)  17.5-3.13-1.6 gram SolR Follow written instructions provided by Clinic 2 Bottle 0     No current facility-administered medications for this visit.        PMFSHx:  Past Medical History:   Diagnosis Date    Arthritis     Hypertension     Thyroid disease        Past Surgical History:   Procedure Laterality Date    ANKLE SURGERY      APPENDECTOMY      CHOLECYSTECTOMY      HAND SURGERY      finger     HYSTERECTOMY      KNEE ARTHROSCOPY      LAPAROSCOPIC CHOLECYSTECTOMY N/A 10/2/2018    Procedure: CHOLECYSTECTOMY-LAPAROSCOPIC;  Surgeon: Ovidio Carrillo MD;  Location: [facility];  Service: General;  Laterality: N/A;    TONSILLECTOMY  1965       Family History   Problem Relation Age of Onset    Dementia Mother     Cancer Father        Social History     Tobacco Use    Smoking status: Former Smoker     Quit date: 2015     Years since quittin.6    Smokeless tobacco: Never Used   Substance Use Topics    Alcohol use: Yes     Alcohol/week: 2.0 standard drinks     Types: 2 Glasses of wine per week    Drug use: Never       Review of Systems   Constitutional: Negative for appetite change, chills, fatigue, fever and unexpected weight change.   HENT: Negative for congestion, dental problem, ear pain, mouth sores, postnasal drip, rhinorrhea, sore throat, tinnitus, trouble swallowing and voice change.    Eyes: Negative for photophobia, pain, discharge and visual disturbance.   Respiratory: Negative for cough, chest tightness, shortness of breath and wheezing.    Cardiovascular: Negative for chest pain, palpitations and leg swelling.   Gastrointestinal: Negative for abdominal distention, anal bleeding, blood in stool, constipation, diarrhea, nausea, rectal pain and vomiting.   Endocrine: Negative for cold intolerance, heat intolerance, polydipsia, polyphagia and polyuria.   Genitourinary: Negative for difficulty urinating, dysuria, flank pain, frequency, hematuria and urgency.   Musculoskeletal:  Negative for arthralgias, joint swelling and myalgias.   Skin: Negative for color change and rash.   Allergic/Immunologic: Negative for immunocompromised state.   Neurological: Negative for dizziness, tremors, seizures, syncope, speech difficulty, weakness, numbness and headaches.   Hematological: Negative for adenopathy. Does not bruise/bleed easily.   Psychiatric/Behavioral: Negative for agitation, confusion, hallucinations, self-injury and suicidal ideas. The patient is not nervous/anxious.             Physical Exam  Constitutional:       General: She is not in acute distress.     Appearance: Normal appearance. She is well-developed and well-groomed. She is ill-appearing and toxic-appearing. She is not diaphoretic.      Comments: Body mass index is 30.34 kg/m².     HENT:      Head: Normocephalic and atraumatic.      Right Ear: Hearing and external ear normal. No drainage or tenderness.      Left Ear: Hearing and external ear normal. No drainage or tenderness.      Nose: Nose normal. No rhinorrhea.      Mouth/Throat:      Mouth: Mucous membranes are not pale, not dry and not cyanotic.      Pharynx: Uvula midline. No oropharyngeal exudate.   Eyes:      General: Lids are normal.         Right eye: No discharge.         Left eye: No discharge.      Extraocular Movements:      Right eye: No nystagmus.      Left eye: No nystagmus.      Conjunctiva/sclera: Conjunctivae normal.      Right eye: Right conjunctiva is not injected. No exudate.     Left eye: No exudate.     Pupils: Pupils are equal, round, and reactive to light.   Neck:      Musculoskeletal: Full passive range of motion without pain.      Thyroid: No thyroid mass or thyromegaly.      Vascular: No carotid bruit or JVD.      Trachea: Trachea and phonation normal. No tracheal deviation.   Cardiovascular:      Rate and Rhythm: Normal rate and regular rhythm.      Chest Wall: PMI is not displaced.      Heart sounds: Normal heart sounds, S1 normal and S2 normal. No  murmur. No friction rub. No gallop.    Pulmonary:      Effort: Pulmonary effort is normal. No accessory muscle usage or respiratory distress.      Breath sounds: Normal breath sounds.   Chest:      Chest wall: No mass, tenderness or crepitus.      Breasts: Breasts are symmetrical.         Right: No inverted nipple, mass, nipple discharge, skin change or tenderness.         Left: No inverted nipple, mass, nipple discharge, skin change or tenderness.   Abdominal:      General: Bowel sounds are normal. There is no distension or abdominal bruit.      Palpations: Abdomen is soft. There is no fluid wave or mass.      Tenderness: There is abdominal tenderness in the left lower quadrant. There is left CVA tenderness. There is no right CVA tenderness, guarding or rebound. Negative signs include Urst's sign, Rovsing's sign, McBurney's sign, psoas sign and obturator sign.      Hernia: No hernia is present. There is no hernia in the left inguinal area.   Genitourinary:     Labia:         Right: No rash or lesion.         Left: No rash or lesion.       Vagina: Normal. No vaginal discharge.      Adnexa:         Right: No mass.          Left: No mass.        Rectum: Normal.   Musculoskeletal:      Cervical back: Normal.      Thoracic back: Normal.      Lumbar back: Normal.      Right upper arm: Normal.      Left upper arm: Normal.      Right forearm: Normal.      Left forearm: Normal.      Right hand: Normal.      Left hand: Normal.      Right upper leg: Normal.      Left upper leg: Normal.      Right lower leg: Normal.      Left lower leg: Normal.      Right foot: Normal.      Left foot: Normal.   Lymphadenopathy:      Head:      Right side of head: No submental, submandibular or posterior auricular adenopathy.      Left side of head: No submental, submandibular or posterior auricular adenopathy.      Cervical: No cervical adenopathy.      Upper Body:      Right upper body: No supraclavicular adenopathy.      Left upper body:  No supraclavicular adenopathy.   Skin:     General: Skin is warm and dry.      Findings: No rash.      Nails: There is no clubbing.     Neurological:      Mental Status: She is alert and oriented to person, place, and time.      GCS: GCS eye subscore is 4. GCS verbal subscore is 5. GCS motor subscore is 6.      Cranial Nerves: No cranial nerve deficit.      Sensory: No sensory deficit.      Coordination: Coordination normal.      Gait: Gait normal.   Psychiatric:         Mood and Affect: Mood is not anxious or depressed. Affect is not inappropriate.         Speech: Speech normal.         Thought Content: Thought content normal.         Judgment: Judgment normal.             Assessment:       Left lower quadrant and left flank pain.  Differential diagnosis includes was not limited to urinary tract infection, pyelonephritis, diverticulitis, etc.  Expedited evaluation and therapy is warranted.  Outpatient therapy inappropriate as she is at risk for sepsis, etc.    1. Left lower quadrant pain    2. Left flank pain          Plan:     Left lower quadrant pain    Left flank pain     Admit to observation.  Laboratory studies.  UA with culture.  CT abdomen and pelvis with contrast.  Intravenous antibiotics.  Intravenous fluids.  Further recommendations and management will depend upon clinical course.    Follow-up after discharge, timing dependent upon clinical course.

## 2020-09-02 NOTE — NURSING
Patient arrived to room 118 from ER via wheelchair.  VSS. Alert and oriented.  Ambulates without difficulty.  Bed in lowest position with wheels locked and siderails up X 2.  Call light within reach. ESPERANZA, RN

## 2020-09-02 NOTE — H&P
[facility] Hospital Medicine  History & Physical    Patient Name: Patrica Donato  MRN: [id number]  Admission Date: 9/2/2020  Attending Physician: Lashonda Iglesias MD   Primary Care Provider: Lorraine Garcia NP         Patient information was obtained from patient and General Surgeon, Dr. Carrillo.     Subjective:     Principal Problem:Left lower quadrant pain    Chief Complaint:   Chief Complaint   Patient presents with    Preadmission clearance /covid        HPI: Patient is a 60-year-old female with past medical history of arthritis, hypertension, hypothyroid who presents as a direct admit from general surgery clinic for left lower quadrant abdominal pain.  Patient reports that for the last 2 weeks she has had intermittent abdominal pain.  States that is periumbilical and higher but is also been in the left lower quadrant and in the right lower quadrant.  She has had an appendectomy and cholecystectomy.  Reports a 1 day history of diarrhea but otherwise no changes in bowel movements including hematochezia or melena.  Denies any fevers.  Denies any nausea or vomiting.  She also reports acute onset of right lower back pain just above the hip.  Pain worsens with movement but she can make it go away if she gets into a certain position or stay still.  Worse with bending.  She received a Toradol shot which she thought helped for 1 day but then the pain came back and was much worse which prompted her to seek care.  She was seen by Dr. Carrillo who called hospital team for admission.  We greatly appreciate the opportunity to be involved in this case.    Past Medical History:   Diagnosis Date    Arthritis     Hypertension     Thyroid disease        Past Surgical History:   Procedure Laterality Date    ANKLE SURGERY  2000    APPENDECTOMY  2015    CHOLECYSTECTOMY      HAND SURGERY  2012    finger     HYSTERECTOMY      KNEE ARTHROSCOPY  2011    LAPAROSCOPIC CHOLECYSTECTOMY N/A  10/2/2018    Procedure: CHOLECYSTECTOMY-LAPAROSCOPIC;  Surgeon: Ovidio Carrillo MD;  Location: [facility] OR;  Service: General;  Laterality: N/A;    TONSILLECTOMY  1965       Review of patient's allergies indicates:   Allergen Reactions    Cerave [ceramides 1,3,6-11]        No current facility-administered medications on file prior to encounter.      Current Outpatient Medications on File Prior to Encounter   Medication Sig    amLODIPine (NORVASC) 5 MG tablet Take 1 tablet (5 mg total) by mouth once daily.    baclofen (LIORESAL) 10 MG tablet Take 1 tablet (10 mg total) by mouth 3 (three) times daily.    CALCIUM CARBONATE/VITAMIN D3 (VITAMIN D-3 ORAL) Take by mouth.    levothyroxine (SYNTHROID) 137 MCG Tab tablet Take 1 tablet (137 mcg total) by mouth before breakfast.    metoprolol succinate (TOPROL-XL) 100 MG 24 hr tablet Take 1 tablet (100 mg total) by mouth once daily.    pantoprazole (PROTONIX) 40 MG tablet Take 1 tablet (40 mg total) by mouth once daily. Take in the morning before breakfast.  Wait 30 minutes before eating or drinking anything    pravastatin (PRAVACHOL) 20 MG tablet Take 1 tablet (20 mg total) by mouth once daily.    sodium,potassium,mag sulfates (SUPREP BOWEL PREP KIT) 17.5-3.13-1.6 gram SolR Follow written instructions provided by Clinic     Family History     Problem Relation (Age of Onset)    Cancer Father    Dementia Mother        Tobacco Use    Smoking status: Former Smoker     Quit date: 2015     Years since quittin.6    Smokeless tobacco: Never Used   Substance and Sexual Activity    Alcohol use: Yes     Alcohol/week: 2.0 standard drinks     Types: 2 Glasses of wine per week    Drug use: Never    Sexual activity: Yes     Partners: Male     Birth control/protection: See Surgical Hx     Review of Systems   Constitutional: Negative for activity change, appetite change, chills, diaphoresis, fatigue and fever.   HENT: Negative.    Eyes: Negative for visual disturbance.    Respiratory: Negative for shortness of breath.    Cardiovascular: Negative for chest pain.   Gastrointestinal: Positive for abdominal pain and diarrhea (x1 episode). Negative for blood in stool, constipation, nausea and vomiting.   Endocrine: Negative.    Genitourinary: Positive for flank pain and pelvic pain. Negative for decreased urine volume, difficulty urinating, dysuria, frequency and menstrual problem.   Musculoskeletal: Positive for back pain. Negative for gait problem.   Skin: Negative for rash and wound.   Neurological: Negative for dizziness, syncope, weakness, light-headedness and headaches.   Hematological: Negative.    Psychiatric/Behavioral: Negative.      Objective:     Vital Signs (Most Recent):  Temp: 96.6 °F (35.9 °C) (09/02/20 1555)  Pulse: 74 (09/02/20 1555)  Resp: 16 (09/02/20 1653)  BP: (!) 167/83 (09/02/20 1555)  SpO2: 96 % (09/02/20 1555) Vital Signs (24h Range):  Temp:  [96.6 °F (35.9 °C)-98.6 °F (37 °C)] 96.6 °F (35.9 °C)  Pulse:  [74-90] 74  Resp:  [16-20] 16  SpO2:  [96 %-98 %] 96 %  BP: (167-175)/(83-96) 167/83     Weight: 86.6 kg (191 lb)  Body mass index is 30.83 kg/m².    Physical Exam  Vitals signs reviewed.   Constitutional:       General: She is not in acute distress.     Appearance: Normal appearance. She is normal weight. She is not ill-appearing or toxic-appearing.   HENT:      Head: Normocephalic and atraumatic.      Right Ear: External ear normal.      Left Ear: External ear normal.      Nose: Nose normal.      Mouth/Throat:      Mouth: Mucous membranes are moist.   Eyes:      Conjunctiva/sclera: Conjunctivae normal.   Cardiovascular:      Rate and Rhythm: Normal rate and regular rhythm.      Pulses: Normal pulses.      Heart sounds: No murmur. No gallop.    Pulmonary:      Effort: Pulmonary effort is normal. No respiratory distress.      Breath sounds: Normal breath sounds. No wheezing or rales.   Abdominal:      General: There is no distension.      Tenderness: There is  right CVA tenderness (very mild). There is no left CVA tenderness.      Comments: Periumbilical, midepigastric and RLQ TTP. Bowel sounds present   Skin:     General: Skin is warm and dry.      Findings: No bruising or erythema.   Neurological:      Mental Status: She is alert and oriented to person, place, and time. Mental status is at baseline.   Psychiatric:         Mood and Affect: Mood normal.         Behavior: Behavior normal.             Significant Labs:   Recent Lab Results       09/02/20  1645   09/02/20  1600   09/02/20  1449        Albumin 4.8         Alkaline Phosphatase 84         ALT 30         Anion Gap 12         Appearance, UA   Clear       AST 21         Baso # 0.02         Basophil% 0.3         Bilirubin (UA)   Negative       BILIRUBIN TOTAL 0.8  Comment:  For infants and newborns, interpretation of results should be based  on gestational age, weight and in agreement with clinical  observations.  Premature Infant recommended reference ranges:  Up to 24 hours.............<8.0 mg/dL  Up to 48 hours............<12.0 mg/dL  3-5 days..................<15.0 mg/dL  6-29 days.................<15.0 mg/dL           BUN, Bld 12         Calcium 9.6         Chloride 103         CO2 24         Color, UA   Yellow       Creatinine 0.6         CRP 0.27         Differential Method Automated         eGFR if  >60.0         eGFR if non  >60.0  Comment:  Calculation used to obtain the estimated glomerular filtration  rate (eGFR) is the CKD-EPI equation.            Eos # 0.1         Eosinophil% 2.1         Glucose 94         Glucose, UA   Negative       Gran # (ANC) 3.3         Gran% 57.3         Hematocrit 41.4         Hemoglobin 13.8         Immature Grans (Abs) 0.01  Comment:  Mild elevation in immature granulocytes is non specific and   can be seen in a variety of conditions including stress response,   acute inflammation, trauma and pregnancy. Correlation with other   laboratory and  clinical findings is essential.           Immature Granulocytes 0.2         Ketones, UA   Negative       Leukocytes, UA   Negative       Lipase 22         Lymph # 1.9         Lymph% 33.7         MCH 29.9         MCHC 33.3         MCV 90         Mono # 0.4         Mono% 6.4         MPV 9.7         NITRITE UA   Negative       nRBC 0         Occult Blood UA   Trace       pH, UA   6.0       Platelets 229         Potassium 3.6         PROTEIN TOTAL 7.7         Protein, UA   Negative  Comment:  Recommend a 24 hour urine protein or a urine   protein/creatinine ratio if globulin induced proteinuria is  clinically suspected.         RBC 4.61         RDW 12.8         SARS-CoV-2 RNA, Amplification, Qual     Negative  Comment:  This test utilizes isothermal nucleic acid amplification   technology to detect the SARS-CoV-2 RdRp nucleic acid segment.   The analytical sensitivity (limit of detection) is 125 genome   equivalents/mL.   A POSITIVE result implies infection with the SARS-CoV-2 virus;  the patient is presumed to be contagious.    A NEGATIVE result means that SARS-CoV-2 nucleic acids are not  present above the limit of detection. A NEGATIVE result should be   treated as presumptive. It does not rule out the possibility of   COVID-19 and should not be the sole basis for treatment decisions.   If COVID-19 is strongly suspected based on clinical and exposure   history, re-testing using an alternate molecular assay should be   considered.   This test is only for use under the Food and Drug   Administration s Emergency Use Authorization (EUA).   Commercial kits are provided by [employer].   Performance characteristics of the EUA have been independently  verified by [facility] Department of  Pathology and Laboratory Medicine.   _________________________________________________________________  The ID NOW COVID-19 Letter of Authorization, along with the   authorized Fact Sheet for Healthcare Providers, the  authorized Fact  Sheet for Patients, and authorized labeling are available on the FDA   website:  www.fda.gov/MedicalDevices/Safety/EmergencySituations/plm863787.htm       Sed Rate 10         Sodium 139         Specific Gravity, UA   1.020       Specimen UA   Urine, Clean Catch       UROBILINOGEN UA   Negative       WBC 5.76             All pertinent labs within the past 24 hours have been reviewed.    Significant Imaging: I have reviewed all pertinent imaging results/findings within the past 24 hours.    Assessment/Plan:     * Left lower quadrant pain  Direct admit from general surgery, follow-up recommendation  Possibly diverticulitis versus UTI versus nephrolithiasis  Follow-up CT abdomen/pelvis  IV fluids, IV antibiotics  Follow-up labs including CBC, CMP, lipase, ESR/CRP        Acute right-sided low back pain without sciatica  Patient with acute right-sided low back/flank pain  Suspicious for urinary tract infection versus nephrolithiasis versus musculoskeletal  Admit and follow-up CT abdomen/pelvis  Start on IV fluids and IV Cipro and Flagyl  Follow-up admit labs  IV morphine p.r.n. pain            Essential hypertension  Continue home medications      Acquired hypothyroidism  Continue home medications      VTE Risk Mitigation (From admission, onward)         Ordered     IP VTE LOW RISK PATIENT  Once      09/02/20 1555     Place sequential compression device  Until discontinued      09/02/20 1555     Place FIGUEROA hose  Until discontinued      09/02/20 1555                   Lashonda Iglesias MD  Department of Hospital Medicine   [facility]

## 2020-09-02 NOTE — ASSESSMENT & PLAN NOTE
Direct admit from general surgery, follow-up recommendation  Possibly diverticulitis versus UTI versus nephrolithiasis  Follow-up CT abdomen/pelvis  IV fluids, IV antibiotics  Follow-up labs including CBC, CMP, lipase, ESR/CRP

## 2020-09-02 NOTE — ASSESSMENT & PLAN NOTE
Patient with acute right-sided low back/flank pain  Suspicious for urinary tract infection versus nephrolithiasis versus musculoskeletal  Admit and follow-up CT abdomen/pelvis  Start on IV fluids and IV Cipro and Flagyl  Follow-up admit labs  IV morphine p.r.n. pain

## 2020-09-03 PROCEDURE — 99213 PR OFFICE/OUTPT VISIT, EST, LEVL III, 20-29 MIN: ICD-10-PCS | Mod: ,,, | Performed by: SURGERY

## 2020-09-03 PROCEDURE — 63600175 PHARM REV CODE 636 W HCPCS: Performed by: SURGERY

## 2020-09-03 PROCEDURE — 25000003 PHARM REV CODE 250: Performed by: SURGERY

## 2020-09-03 PROCEDURE — 96375 TX/PRO/DX INJ NEW DRUG ADDON: CPT

## 2020-09-03 PROCEDURE — 99225 PR SUBSEQUENT OBSERVATION CARE,LEVEL II: ICD-10-PCS | Mod: ,,, | Performed by: FAMILY MEDICINE

## 2020-09-03 PROCEDURE — S0030 INJECTION, METRONIDAZOLE: HCPCS | Performed by: FAMILY MEDICINE

## 2020-09-03 PROCEDURE — 96366 THER/PROPH/DIAG IV INF ADDON: CPT

## 2020-09-03 PROCEDURE — 25000003 PHARM REV CODE 250: Performed by: FAMILY MEDICINE

## 2020-09-03 PROCEDURE — 99213 OFFICE O/P EST LOW 20 MIN: CPT | Mod: ,,, | Performed by: SURGERY

## 2020-09-03 PROCEDURE — 96361 HYDRATE IV INFUSION ADD-ON: CPT

## 2020-09-03 PROCEDURE — 99225 PR SUBSEQUENT OBSERVATION CARE,LEVEL II: CPT | Mod: ,,, | Performed by: FAMILY MEDICINE

## 2020-09-03 PROCEDURE — 96376 TX/PRO/DX INJ SAME DRUG ADON: CPT

## 2020-09-03 PROCEDURE — G0378 HOSPITAL OBSERVATION PER HR: HCPCS

## 2020-09-03 RX ORDER — KETOROLAC TROMETHAMINE 30 MG/ML
INJECTION, SOLUTION INTRAMUSCULAR; INTRAVENOUS
Status: DISPENSED
Start: 2020-09-03 | End: 2020-09-03

## 2020-09-03 RX ORDER — PROMETHAZINE HYDROCHLORIDE 12.5 MG/1
25 TABLET ORAL EVERY 4 HOURS PRN
Status: DISCONTINUED | OUTPATIENT
Start: 2020-09-03 | End: 2020-09-05 | Stop reason: HOSPADM

## 2020-09-03 RX ORDER — HYDROCODONE BITARTRATE AND ACETAMINOPHEN 10; 325 MG/1; MG/1
2 TABLET ORAL EVERY 4 HOURS PRN
Status: DISCONTINUED | OUTPATIENT
Start: 2020-09-03 | End: 2020-09-05 | Stop reason: HOSPADM

## 2020-09-03 RX ORDER — KETOROLAC TROMETHAMINE 30 MG/ML
15 INJECTION, SOLUTION INTRAMUSCULAR; INTRAVENOUS EVERY 6 HOURS PRN
Status: DISCONTINUED | OUTPATIENT
Start: 2020-09-03 | End: 2020-09-05 | Stop reason: HOSPADM

## 2020-09-03 RX ADMIN — METRONIDAZOLE 500 MG: 500 INJECTION, SOLUTION INTRAVENOUS at 06:09

## 2020-09-03 RX ADMIN — HYDROCODONE BITARTRATE AND ACETAMINOPHEN 2 TABLET: 10; 325 TABLET ORAL at 11:09

## 2020-09-03 RX ADMIN — SODIUM CHLORIDE: 0.9 INJECTION, SOLUTION INTRAVENOUS at 07:09

## 2020-09-03 RX ADMIN — CIPROFLOXACIN 400 MG: 2 INJECTION, SOLUTION INTRAVENOUS at 03:09

## 2020-09-03 RX ADMIN — PROMETHAZINE HYDROCHLORIDE 25 MG: 12.5 TABLET ORAL at 11:09

## 2020-09-03 RX ADMIN — LEVOTHYROXINE SODIUM 137 MCG: 0.11 TABLET ORAL at 05:09

## 2020-09-03 RX ADMIN — METRONIDAZOLE 500 MG: 500 INJECTION, SOLUTION INTRAVENOUS at 03:09

## 2020-09-03 RX ADMIN — CIPROFLOXACIN 400 MG: 2 INJECTION, SOLUTION INTRAVENOUS at 04:09

## 2020-09-03 RX ADMIN — BACLOFEN 10 MG: 10 TABLET ORAL at 07:09

## 2020-09-03 RX ADMIN — BACLOFEN 10 MG: 10 TABLET ORAL at 02:09

## 2020-09-03 RX ADMIN — ONDANSETRON HYDROCHLORIDE 4 MG: 2 SOLUTION INTRAMUSCULAR; INTRAVENOUS at 03:09

## 2020-09-03 RX ADMIN — ONDANSETRON HYDROCHLORIDE 4 MG: 2 SOLUTION INTRAMUSCULAR; INTRAVENOUS at 08:09

## 2020-09-03 RX ADMIN — HYDROCODONE BITARTRATE AND ACETAMINOPHEN 2 TABLET: 10; 325 TABLET ORAL at 03:09

## 2020-09-03 RX ADMIN — METRONIDAZOLE 500 MG: 500 INJECTION, SOLUTION INTRAVENOUS at 10:09

## 2020-09-03 RX ADMIN — PANTOPRAZOLE SODIUM 40 MG: 40 TABLET, DELAYED RELEASE ORAL at 07:09

## 2020-09-03 RX ADMIN — METOPROLOL SUCCINATE 100 MG: 25 TABLET, EXTENDED RELEASE ORAL at 07:09

## 2020-09-03 RX ADMIN — BACLOFEN 10 MG: 10 TABLET ORAL at 09:09

## 2020-09-03 RX ADMIN — HYDROCODONE BITARTRATE AND ACETAMINOPHEN 2 TABLET: 10; 325 TABLET ORAL at 07:09

## 2020-09-03 RX ADMIN — AMLODIPINE BESYLATE 5 MG: 2.5 TABLET ORAL at 07:09

## 2020-09-03 NOTE — PROGRESS NOTES
[facility]  General Surgery  Progress Note  09/03/2020    Patient Name: Patrica Donato  MRN: [id number]  Admission Date: 9/2/2020  Hospital Day:  2    Antibiotics:  Cipro/Flagyl - Day 2    Interval History:  Continues with right-sided abdominal and flank pain.  Mild nausea.  Single episode of emesis.  Now tolerating liquids.  Passing stool and flatus.  Mild diarrhea.    Vital Signs:  Afebrile.  Good vital signs.  Good room air oxygen saturations.    I/O:  Inaccurate  UOP:  Good    Exam:   Gen - Comfortable.  Nontoxic.  No acute distress.  HEENT - Normocephalic.  Atraumatic.  Pupils equal and round.  Sclerae anicteric.  Nares patent without discharge.  Neck - Trachea midline. No masses.  No JVD.  No bruits.  CV - Regular rate and rhythm.  Good perfusion.  Intact distal pulses.  No Edema.  Pulm - Clear to auscultation.  Nonlabored.  No rales, wheezes, rhonci.  Abdomen - Soft.  Mild diffuse tenderness most prominent lateral right upper quadrant..  Nondistended.  Normoactive normopitched bowel sounds.  No guarding/rebound.  Extremities - No edema.  No cords.  No tenderness.  Integument - No rashes.  No lesions.  No jaundice.  No decubiti  Lymphatics - No adenopathy; cervical, supraclavicular, axillary, inguinal  Neuro - No focal deficits.  GCS 15.  Alert and oriented to person, time, and place.        Lab Results:  No new lab results      Radiology Results:  No new imaging results    Assessment:  Clinically stable.  Diverticulitis.  No significant improvement.    Recommendations:  Continue intravenous antibiotics.  Repeat laboratory studies tomorrow.  Stool studies if diarrhea persists.  Further recommendations will depend upon clinical course.  Continue supportive care.    Plan:  Will follow while hospitalized.  Repeat laboratory studies ordered for tomorrow.  Further management will depend upon clinical course.        Ovidio Carrillo MD FACS

## 2020-09-03 NOTE — PLAN OF CARE
09/03/20 0840   Discharge Assessment   Assessment Type Discharge Planning Assessment   Assessment information obtained from? Patient   Expected Length of Stay (days) 2   Communicated expected length of stay with patient/caregiver yes   Prior to hospitilization cognitive status: Alert/Oriented   Prior to hospitalization functional status: Independent   Current cognitive status: Alert/Oriented   Current Functional Status: Independent   Lives With significant other   Able to Return to Prior Arrangements yes   Is patient able to care for self after discharge? Yes   Who are your caregiver(s) and their phone number(s)? Herman Wellington significant other [phone]   Patient's perception of discharge disposition home or selfcare   Readmission Within the Last 30 Days no previous admission in last 30 days   Patient currently being followed by outpatient case management? No   Patient currently receives any other outside agency services? No   Equipment Currently Used at Home none   Do you have any problems affording any of your prescribed medications? No   Is the patient taking medications as prescribed? yes   Does the patient have transportation home? Yes   Transportation Anticipated car, drives self   Does the patient receive services at the [facility]? No   Discharge Plan A Home with family   DME Needed Upon Discharge  none   Patient/Family in Agreement with Plan yes   Patient lives at home with her significant other. She works full time for [employer]. She is independent & denies using and DME at home. Denies any discharge needs at this time. Will continue to follow.

## 2020-09-03 NOTE — PLAN OF CARE
Problem: Adult Inpatient Plan of Care  Goal: Plan of Care Review  Outcome: Ongoing, Progressing  Goal: Patient-Specific Goal (Individualization)  Outcome: Ongoing, Progressing  Goal: Absence of Hospital-Acquired Illness or Injury  Outcome: Ongoing, Progressing  Goal: Optimal Comfort and Wellbeing  Outcome: Ongoing, Progressing  Goal: Readiness for Transition of Care  Outcome: Ongoing, Progressing  Goal: Rounds/Family Conference  Outcome: Ongoing, Progressing     Problem: Pain Acute  Goal: Optimal Pain Control  Outcome: Ongoing, Progressing

## 2020-09-03 NOTE — CONSULTS
[facility] - Med Surg  General Surgery  Consult Note  09/02/2020    Patient Name: Patrica Donato  MRN: [id number]  Admission Date: 9/2/2020        REASON FOR CONSULT:  Abdominal pain    HISTORY:  Ms. Donato returns today with left lower quadrant and left flank pain.  She was seen in the clinic 2 or 3 days ago with right upper quadrant pain that radiated through to her thoracolumbar back and epigastric burning pain that radiated retrosternally with regurgitation of bitter fluid.  Protonix therapy was reinstituted.  The symptoms have resolved.  Last night she had 2 large watery bowel movements and noted lower abdominal pain in the suprapubic region.  Pain subsequently migrated to the left lower quadrant and flank.  No nausea or vomiting.  No constipation.  No melena or hematochezia.  No fever or chills.  No dysuria, urgency, frequency, hesitancy, nocturia, hematuria.  No other associated symptoms.  No aggravating or alleviating factors.  No other associated symptoms    PAST MEDICAL HISTORY:  Gastroesophageal reflux disease.  Hypertension.  Hypothyroidism.  Hyperlipidemia.  ORIF ankle.  Appendectomy.  Cholecystectomy.  Hysterectomy.  Tonsillectomy.    ALLERGIES: Cerave    HOME MEDICATIONS: Amlodipine.  Baclofen.  Vitamin-D 3.  Calcium.  Levothyroxine.  Metoprolol.  Protonix.  Pravastatin.    HOSPITAL MEDICATIONS:  Amlodipine.  Baclofen.  Cipro.  Flagyl.  Levothyroxine.  Metoprolol.  Protonix.    SOCIAL HISTORY:  Social consumer of alcohol.  No history of alcohol dependence or withdrawal.  No history of any illegal or recreational drug use.  Nonsmoker.    FAMILY HISTORY:  Noncontributory.  No family history of any anesthetic complications, bleeding disorders, inflammatory bowel disease, gastrointestinal malignancies, biliary disease, or ulcer disease.    ROS:  As above otherwise all 14 systems entirely negative    Physical Exam:   Gen.-normotensive, normocardic, comfortable, no acute distress, GCS  15.   HEENT-normocephalic, atraumatic, sclerae are anicteric, pupils equal round reactive to light, conjunctiva clear, nares patent without discharge, dentition fair, no oral lesions, oropharynx clear without exudates, mucous membranes moist.   Neck-nontender, full range of motion, no JVD, no bruits, no masses, no thyromegaly.   Cardiovascular-regular rate and rhythm, PMI nondisplaced, tones normal, no gallops, no rubs, no murmurs.  Intact distal pulses throughout.  No peripheral edema.  No bruits or thrills.  Good perfusion.   Pulmonary-clear to auscultation, no respiratory distress, no wheezes, no rales, no rhonchi, good full symmetrical excursion, no accessory muscle use, no retractions.   Abdomen-soft, tender right upper and lower quadrant, tender right flank, nondistended, normoactive normopitched bowel sounds, no masses, no ventral hernias, no inguinal hernias, no bruits, no hepatosplenomegaly, no guarding, no rebound.  Negative heel jolt, psoas, obturator, and Rust signs.  No percussion, referred, or CVA tenderness.  -normal external genitalia, no lesions, no discharge, no masses.  Rectal-no masses, normal sphincter tone, nontender, Hemoccult negative.    Extremities/Musculoskeletal-no clubbing, no cyanosis, no edema, no gross deformities.  Integument-no rashes, no lesions, no jaundice, no induration, no decubiti.    Lymphatic-no adenopathy - cervical, supraclavicular, axillary, or inguinal.    Psych-mental status intact, no hallucinations, no suicidal ideations, no homicidal ideations, alert, appropriate, oriented to person, time, and place.  Neuro-no gross cranial nerve deficits, no gross motor deficits, no evident sensory deficits, no incoordination, no tremors    LAB RESULTS:  No leukocytosis, anemia, or thrombocytopenia.  Electrolytes are all in the range of normal.  BUN and creatinine shows no evidence of renal dysfunction.  Glucose shows no suspicion diabetes.  Liver profile shows no evidence of  hepatocellular disease or biliary obstruction.  ESR and CRP in the range of normal.  Rapid COVID screening negative.  Urinalysis without evidence of hematuria or urinary tract infection.    RADIOLOGY RESULTS:  Chest x-ray film and report reviewed, no acute cardiopulmonary pathology evident.  CT scan of the abdomen pelvis with oral and IV contrast films and report reviewed.  There is evidence of diverticulitis in the hepatic flexure of her colon.    CARDIOVASCULAR STUDIES:  12 lead EKG shows normal sinus rhythm with no evidence of any ischemic changes.    ASSESSMENT:  Acute diverticulitis.  Seen recently in surgery clinic and scheduled for outpatient endoscopy.    MEDICAL DECISION MAKING:  Continued intravenous antibiotic therapy is warranted.      PLAN:  Will follow while hospitalized.  Further management will depend upon clinical course.  Proceed with outpatient endoscopy as scheduled, sooner if needed.    RECOMMENDATIONS:  Continue intravenous Cipro and Flagyl.  Advance diet to clear liquids.  Monitor closely.  Continue supportive care.  Further recommendations will depend upon clinical course.        Ovidio Carrillo MD  9/2/2020

## 2020-09-04 LAB
ALBUMIN SERPL BCP-MCNC: 4.2 G/DL (ref 3.5–5.2)
ALP SERPL-CCNC: 82 U/L (ref 55–135)
ALT SERPL W/O P-5'-P-CCNC: 23 U/L (ref 10–44)
ANION GAP SERPL CALC-SCNC: 7 MMOL/L (ref 8–16)
AST SERPL-CCNC: 18 U/L (ref 10–40)
BASOPHILS # BLD AUTO: 0.03 K/UL (ref 0–0.2)
BASOPHILS NFR BLD: 0.5 % (ref 0–1.9)
BILIRUB SERPL-MCNC: 0.5 MG/DL (ref 0.1–1)
BUN SERPL-MCNC: 7 MG/DL (ref 8–23)
CALCIUM SERPL-MCNC: 8.8 MG/DL (ref 8.7–10.5)
CHLORIDE SERPL-SCNC: 105 MMOL/L (ref 95–110)
CO2 SERPL-SCNC: 27 MMOL/L (ref 23–29)
CREAT SERPL-MCNC: 0.7 MG/DL (ref 0.5–1.4)
DIFFERENTIAL METHOD: NORMAL
EOSINOPHIL # BLD AUTO: 0.2 K/UL (ref 0–0.5)
EOSINOPHIL NFR BLD: 3.5 % (ref 0–8)
ERYTHROCYTE [DISTWIDTH] IN BLOOD BY AUTOMATED COUNT: 12.7 % (ref 11.5–14.5)
EST. GFR  (AFRICAN AMERICAN): >60 ML/MIN/1.73 M^2
EST. GFR  (NON AFRICAN AMERICAN): >60 ML/MIN/1.73 M^2
GLUCOSE SERPL-MCNC: 110 MG/DL (ref 70–110)
HCT VFR BLD AUTO: 39.3 % (ref 37–48.5)
HGB BLD-MCNC: 12.7 G/DL (ref 12–16)
IMM GRANULOCYTES # BLD AUTO: 0.01 K/UL (ref 0–0.04)
IMM GRANULOCYTES NFR BLD AUTO: 0.2 % (ref 0–0.5)
LYMPHOCYTES # BLD AUTO: 1.7 K/UL (ref 1–4.8)
LYMPHOCYTES NFR BLD: 27.3 % (ref 18–48)
MAGNESIUM SERPL-MCNC: 1.7 MG/DL (ref 1.6–2.6)
MCH RBC QN AUTO: 29.5 PG (ref 27–31)
MCHC RBC AUTO-ENTMCNC: 32.3 G/DL (ref 32–36)
MCV RBC AUTO: 91 FL (ref 82–98)
MONOCYTES # BLD AUTO: 0.5 K/UL (ref 0.3–1)
MONOCYTES NFR BLD: 7.5 % (ref 4–15)
NEUTROPHILS # BLD AUTO: 3.8 K/UL (ref 1.8–7.7)
NEUTROPHILS NFR BLD: 61 % (ref 38–73)
NRBC BLD-RTO: 0 /100 WBC
PHOSPHATE SERPL-MCNC: 4 MG/DL (ref 2.7–4.5)
PLATELET # BLD AUTO: 212 K/UL (ref 150–350)
PMV BLD AUTO: 9.9 FL (ref 9.2–12.9)
POTASSIUM SERPL-SCNC: 3.6 MMOL/L (ref 3.5–5.1)
PROT SERPL-MCNC: 6.8 G/DL (ref 6–8.4)
RBC # BLD AUTO: 4.31 M/UL (ref 4–5.4)
SODIUM SERPL-SCNC: 139 MMOL/L (ref 136–145)
WBC # BLD AUTO: 6.26 K/UL (ref 3.9–12.7)

## 2020-09-04 PROCEDURE — 63600175 PHARM REV CODE 636 W HCPCS: Performed by: SURGERY

## 2020-09-04 PROCEDURE — 96376 TX/PRO/DX INJ SAME DRUG ADON: CPT

## 2020-09-04 PROCEDURE — G0378 HOSPITAL OBSERVATION PER HR: HCPCS

## 2020-09-04 PROCEDURE — 83735 ASSAY OF MAGNESIUM: CPT

## 2020-09-04 PROCEDURE — 80053 COMPREHEN METABOLIC PANEL: CPT

## 2020-09-04 PROCEDURE — 36415 COLL VENOUS BLD VENIPUNCTURE: CPT

## 2020-09-04 PROCEDURE — 84100 ASSAY OF PHOSPHORUS: CPT

## 2020-09-04 PROCEDURE — 96361 HYDRATE IV INFUSION ADD-ON: CPT

## 2020-09-04 PROCEDURE — S0030 INJECTION, METRONIDAZOLE: HCPCS | Performed by: FAMILY MEDICINE

## 2020-09-04 PROCEDURE — 99225 PR SUBSEQUENT OBSERVATION CARE,LEVEL II: CPT | Mod: ,,, | Performed by: FAMILY MEDICINE

## 2020-09-04 PROCEDURE — 99213 OFFICE O/P EST LOW 20 MIN: CPT | Mod: ,,, | Performed by: SURGERY

## 2020-09-04 PROCEDURE — 25000003 PHARM REV CODE 250: Performed by: SURGERY

## 2020-09-04 PROCEDURE — 99213 PR OFFICE/OUTPT VISIT, EST, LEVL III, 20-29 MIN: ICD-10-PCS | Mod: ,,, | Performed by: SURGERY

## 2020-09-04 PROCEDURE — 25000003 PHARM REV CODE 250: Performed by: FAMILY MEDICINE

## 2020-09-04 PROCEDURE — 85025 COMPLETE CBC W/AUTO DIFF WBC: CPT

## 2020-09-04 PROCEDURE — 99225 PR SUBSEQUENT OBSERVATION CARE,LEVEL II: ICD-10-PCS | Mod: ,,, | Performed by: FAMILY MEDICINE

## 2020-09-04 RX ADMIN — CIPROFLOXACIN 400 MG: 2 INJECTION, SOLUTION INTRAVENOUS at 04:09

## 2020-09-04 RX ADMIN — HYDROCODONE BITARTRATE AND ACETAMINOPHEN 2 TABLET: 10; 325 TABLET ORAL at 11:09

## 2020-09-04 RX ADMIN — METRONIDAZOLE 500 MG: 500 INJECTION, SOLUTION INTRAVENOUS at 03:09

## 2020-09-04 RX ADMIN — METOPROLOL SUCCINATE 100 MG: 25 TABLET, EXTENDED RELEASE ORAL at 07:09

## 2020-09-04 RX ADMIN — PROMETHAZINE HYDROCHLORIDE 25 MG: 12.5 TABLET ORAL at 05:09

## 2020-09-04 RX ADMIN — BACLOFEN 10 MG: 10 TABLET ORAL at 08:09

## 2020-09-04 RX ADMIN — METRONIDAZOLE 500 MG: 500 INJECTION, SOLUTION INTRAVENOUS at 11:09

## 2020-09-04 RX ADMIN — BACLOFEN 10 MG: 10 TABLET ORAL at 03:09

## 2020-09-04 RX ADMIN — ONDANSETRON HYDROCHLORIDE 4 MG: 2 SOLUTION INTRAMUSCULAR; INTRAVENOUS at 04:09

## 2020-09-04 RX ADMIN — HYDROCODONE BITARTRATE AND ACETAMINOPHEN 2 TABLET: 10; 325 TABLET ORAL at 07:09

## 2020-09-04 RX ADMIN — HYDROCODONE BITARTRATE AND ACETAMINOPHEN 2 TABLET: 10; 325 TABLET ORAL at 04:09

## 2020-09-04 RX ADMIN — BACLOFEN 10 MG: 10 TABLET ORAL at 07:09

## 2020-09-04 RX ADMIN — AMLODIPINE BESYLATE 5 MG: 2.5 TABLET ORAL at 07:09

## 2020-09-04 RX ADMIN — METRONIDAZOLE 500 MG: 500 INJECTION, SOLUTION INTRAVENOUS at 08:09

## 2020-09-04 RX ADMIN — SODIUM CHLORIDE: 0.9 INJECTION, SOLUTION INTRAVENOUS at 06:09

## 2020-09-04 RX ADMIN — PANTOPRAZOLE SODIUM 40 MG: 40 TABLET, DELAYED RELEASE ORAL at 07:09

## 2020-09-04 RX ADMIN — HYDROCODONE BITARTRATE AND ACETAMINOPHEN 1 TABLET: 10; 325 TABLET ORAL at 03:09

## 2020-09-04 RX ADMIN — PROMETHAZINE HYDROCHLORIDE 25 MG: 12.5 TABLET ORAL at 04:09

## 2020-09-04 RX ADMIN — LEVOTHYROXINE SODIUM 137 MCG: 0.11 TABLET ORAL at 05:09

## 2020-09-04 RX ADMIN — HYDROCODONE BITARTRATE AND ACETAMINOPHEN 2 TABLET: 10; 325 TABLET ORAL at 08:09

## 2020-09-04 NOTE — PROGRESS NOTES
[facility] - Med [facility] Hospital Medicine  Progress Note    Patient Name: Patrica Donato  MRN: [id number]  Patient Class: OP- Observation   Admission Date: 9/2/2020  Length of Stay: 0 days  Attending Physician: Lashonda Iglesias MD  Primary Care Provider: Lorraine Garcia NP        Subjective:     Principal Problem:Left lower quadrant pain        HPI:  Patient is a 60-year-old female with past medical history of arthritis, hypertension, hypothyroid who presents as a direct admit from general surgery clinic for left lower quadrant abdominal pain.  Patient reports that for the last 2 weeks she has had intermittent abdominal pain.  States that is periumbilical and higher but is also been in the left lower quadrant and in the right lower quadrant.  She has had an appendectomy and cholecystectomy.  Reports a 1 day history of diarrhea but otherwise no changes in bowel movements including hematochezia or melena.  Denies any fevers.  Denies any nausea or vomiting.  She also reports acute onset of right lower back pain just above the hip.  Pain worsens with movement but she can make it go away if she gets into a certain position or stay still.  Worse with bending.  She received a Toradol shot which she thought helped for 1 day but then the pain came back and was much worse which prompted her to seek care.  She was seen by Dr. Carrillo who called hospital team for admission.  We greatly appreciate the opportunity to be involved in this case.    Overview/Hospital Course:  9/3/2020  Patient admitted and started on IVFs and IV cipro and flagyl. CMP without significant electrolyte abnormalities. Vital signs have remained stable. Following recommendations of General Surgery.     Interval History: no events    Review of Systems   Constitutional: Positive for fatigue. Negative for fever.   HENT: Negative.    Eyes: Negative.    Respiratory: Negative.    Gastrointestinal: Positive for abdominal pain and nausea.  Negative for vomiting.   Endocrine: Negative.    Genitourinary: Negative.    Musculoskeletal: Negative.    Skin: Negative.    Allergic/Immunologic: Negative.    Neurological: Negative.    Hematological: Negative.    Psychiatric/Behavioral: Negative.      Objective:     Vital Signs (Most Recent):  Temp: 96.6 °F (35.9 °C) (09/03/20 1933)  Pulse: 70 (09/03/20 1933)  Resp: 18 (09/03/20 1933)  BP: (!) 178/82 (09/03/20 1933)  SpO2: 96 % (09/03/20 1933) Vital Signs (24h Range):  Temp:  [96.2 °F (35.7 °C)-97.4 °F (36.3 °C)] 96.6 °F (35.9 °C)  Pulse:  [68-73] 70  Resp:  [16-20] 18  SpO2:  [95 %-98 %] 96 %  BP: (129-178)/(60-82) 178/82     Weight: 86.6 kg (191 lb)  Body mass index is 30.83 kg/m².    Intake/Output Summary (Last 24 hours) at 9/3/2020 2112  Last data filed at 9/3/2020 1804  Gross per 24 hour   Intake 3333 ml   Output --   Net 3333 ml      Physical Exam  Vitals signs reviewed.   Constitutional:       General: She is not in acute distress.     Appearance: Normal appearance. She is not toxic-appearing.   HENT:      Head: Normocephalic and atraumatic.      Right Ear: External ear normal.      Left Ear: External ear normal.      Nose: Nose normal.      Mouth/Throat:      Mouth: Mucous membranes are moist.   Eyes:      Conjunctiva/sclera: Conjunctivae normal.   Cardiovascular:      Rate and Rhythm: Normal rate and regular rhythm.      Pulses: Normal pulses.      Heart sounds: No murmur. No gallop.    Pulmonary:      Effort: Pulmonary effort is normal. No respiratory distress.      Breath sounds: Normal breath sounds. No wheezing or rales.   Abdominal:      General: Bowel sounds are normal. There is no distension.      Tenderness: There is abdominal tenderness (generalized TTP).   Musculoskeletal:      Right lower leg: No edema.      Left lower leg: No edema.   Skin:     General: Skin is warm and dry.   Neurological:      Mental Status: She is alert. Mental status is at baseline.   Psychiatric:         Mood and Affect:  Mood normal.         Behavior: Behavior normal.         Significant Labs:   Recent Lab Results     None        All pertinent labs within the past 24 hours have been reviewed.    Significant Imaging: I have reviewed all pertinent imaging results/findings within the past 24 hours.      Assessment/Plan:      * Left lower quadrant pain  Direct admit from general surgery, follow-up recommendation  Possibly diverticulitis versus UTI versus nephrolithiasis  Follow-up CT abdomen/pelvis  IV fluids, IV antibiotics  Follow-up labs including CBC, CMP, lipase, ESR/CRP        Diverticulitis  Started on IV Cipro/Flagyl  Continuing IVFs  Patient with continued abdominal pain, decreased appetite  Continuing to follow recommendations of General Surgery        Acute right-sided low back pain without sciatica  Patient with acute right-sided low back/flank pain  Suspicious for urinary tract infection versus nephrolithiasis versus musculoskeletal  Admit and follow-up CT abdomen/pelvis  Start on IV fluids and IV Cipro and Flagyl  Follow-up admit labs  IV morphine p.r.n. pain            Essential hypertension  Continue home medications      Acquired hypothyroidism  Continue home medications        VTE Risk Mitigation (From admission, onward)         Ordered     IP VTE LOW RISK PATIENT  Once      09/02/20 1555     Place sequential compression device  Until discontinued      09/02/20 1555     Place FIGUEROA hose  Until discontinued      09/02/20 1555                Discharge Planning   SHANTANU:      Code Status: Full Code   Is the patient medically ready for discharge?:     Reason for patient still in hospital (select all that apply): Treatment  Discharge Plan A: Home with family                  Lashonda Iglesias MD  Department of Hospital Medicine   [facility]

## 2020-09-04 NOTE — PROGRESS NOTES
[facility] - Med [facility]  General Surgery  Progress Note  09/04/2020    Patient Name: Patrica Donato  MRN: [id number]  Admission Date: 9/2/2020  Hospital Day:  3    Antibiotics:  Cipro/Flagyl - Day 3    Interval History:  Slightly improved but persistent right-sided abdominal and flank pain.  Mild nausea.  No further emesis.  Tolerating liquids.  Passing stool and flatus.  Small ribbon like stool today    Vital Signs:  Afebrile.  Good vital signs.  Good room air oxygen saturations.    I/O:  Inaccurate  UOP:  Good    Exam:   Gen - Comfortable.  Nontoxic.  No acute distress.  HEENT - Normocephalic.  Atraumatic.  Pupils equal and round.  Sclerae anicteric.  Nares patent without discharge.  Neck - Trachea midline. No masses.  No JVD.  No bruits.  CV - Regular rate and rhythm.  Good perfusion.  Intact distal pulses.  No Edema.  Pulm - Clear to auscultation.  Nonlabored.  No rales, wheezes, rhonci.  Abdomen - Soft.  Mild diffuse tenderness most prominent lateral right upper quadrant..  Nondistended.  Normoactive normopitched bowel sounds.  No guarding/rebound.  Extremities - No edema.  No cords.  No tenderness.  Integument - No rashes.  No lesions.  No jaundice.  No decubiti  Lymphatics - No adenopathy; cervical, supraclavicular, axillary, inguinal  Neuro - No focal deficits.  GCS 15.  Alert and oriented to person, time, and place.    Lab Results:  No leukocytosis.  No anemia.  No thrombocytopenia.  Electrolytes are all in the range of normal.  BUN and creatinine shows no evidence of renal dysfunction.  Glucose shows no suspicion diabetes.  Liver profile shows no evidence of hepatocellular disease or biliary obstruction.    Radiology Results:  No new imaging results    Assessment:  Clinically stable.  Diverticulitis.  Minimal improvement.    Recommendations:  Continue intravenous antibiotics.  Consider EGD and colonoscopy this weekend if symptoms persist without significant improvement.  Decrease  intravenous fluids.  Continue supportive care.  Further recommendations will depend upon clinical course.      Plan:  Will follow while hospitalized.  Further management will depend upon clinical course.        Ovidio Carrillo MD FACS

## 2020-09-04 NOTE — SUBJECTIVE & OBJECTIVE
Interval History: no events    Review of Systems   Constitutional: Positive for fatigue. Negative for fever.   HENT: Negative.    Eyes: Negative.    Respiratory: Negative.    Gastrointestinal: Positive for abdominal pain and nausea. Negative for vomiting.   Endocrine: Negative.    Genitourinary: Negative.    Musculoskeletal: Negative.    Skin: Negative.    Allergic/Immunologic: Negative.    Neurological: Negative.    Hematological: Negative.    Psychiatric/Behavioral: Negative.      Objective:     Vital Signs (Most Recent):  Temp: 96.6 °F (35.9 °C) (09/03/20 1933)  Pulse: 70 (09/03/20 1933)  Resp: 18 (09/03/20 1933)  BP: (!) 178/82 (09/03/20 1933)  SpO2: 96 % (09/03/20 1933) Vital Signs (24h Range):  Temp:  [96.2 °F (35.7 °C)-97.4 °F (36.3 °C)] 96.6 °F (35.9 °C)  Pulse:  [68-73] 70  Resp:  [16-20] 18  SpO2:  [95 %-98 %] 96 %  BP: (129-178)/(60-82) 178/82     Weight: 86.6 kg (191 lb)  Body mass index is 30.83 kg/m².    Intake/Output Summary (Last 24 hours) at 9/3/2020 2112  Last data filed at 9/3/2020 1804  Gross per 24 hour   Intake 3333 ml   Output --   Net 3333 ml      Physical Exam  Vitals signs reviewed.   Constitutional:       General: She is not in acute distress.     Appearance: Normal appearance. She is not toxic-appearing.   HENT:      Head: Normocephalic and atraumatic.      Right Ear: External ear normal.      Left Ear: External ear normal.      Nose: Nose normal.      Mouth/Throat:      Mouth: Mucous membranes are moist.   Eyes:      Conjunctiva/sclera: Conjunctivae normal.   Cardiovascular:      Rate and Rhythm: Normal rate and regular rhythm.      Pulses: Normal pulses.      Heart sounds: No murmur. No gallop.    Pulmonary:      Effort: Pulmonary effort is normal. No respiratory distress.      Breath sounds: Normal breath sounds. No wheezing or rales.   Abdominal:      General: Bowel sounds are normal. There is no distension.      Tenderness: There is abdominal tenderness (generalized TTP).    Musculoskeletal:      Right lower leg: No edema.      Left lower leg: No edema.   Skin:     General: Skin is warm and dry.   Neurological:      Mental Status: She is alert. Mental status is at baseline.   Psychiatric:         Mood and Affect: Mood normal.         Behavior: Behavior normal.         Significant Labs:   Recent Lab Results     None        All pertinent labs within the past 24 hours have been reviewed.    Significant Imaging: I have reviewed all pertinent imaging results/findings within the past 24 hours.

## 2020-09-04 NOTE — PROGRESS NOTES
[facility] Medicine  Progress Note    Patient Name: Patrica Donato  MRN: [id number]  Patient Class: OP- Observation   Admission Date: 9/2/2020  Length of Stay: 0 days  Attending Physician: Lashonda Iglesias MD  Primary Care Provider: Lorraine Garcia NP        Subjective:     Principal Problem:Left lower quadrant pain        HPI:  Patient is a 60-year-old female with past medical history of arthritis, hypertension, hypothyroid who presents as a direct admit from general surgery clinic for left lower quadrant abdominal pain.  Patient reports that for the last 2 weeks she has had intermittent abdominal pain.  States that is periumbilical and higher but is also been in the left lower quadrant and in the right lower quadrant.  She has had an appendectomy and cholecystectomy.  Reports a 1 day history of diarrhea but otherwise no changes in bowel movements including hematochezia or melena.  Denies any fevers.  Denies any nausea or vomiting.  She also reports acute onset of right lower back pain just above the hip.  Pain worsens with movement but she can make it go away if she gets into a certain position or stay still.  Worse with bending.  She received a Toradol shot which she thought helped for 1 day but then the pain came back and was much worse which prompted her to seek care.  She was seen by Dr. Carrillo who called hospital team for admission.  We greatly appreciate the opportunity to be involved in this case.    Overview/Hospital Course:  9/3/2020  Patient admitted and started on IVFs and IV cipro and flagyl. CMP without significant electrolyte abnormalities. Vital signs have remained stable. Following recommendations of General Surgery.     9/4/2020  Patient continued on IV fluids, IV Cipro and Flagyl.  Continuing to monitor daily labs.  Vital signs are stable.  Patient with continued abdominal pain.  Following recommendations of General surgery.    Interval History:   No acute event    Review of Systems   Constitutional: Negative for fatigue and fever.   HENT: Negative.    Eyes: Negative for visual disturbance.   Respiratory: Negative for shortness of breath.    Cardiovascular: Negative for chest pain.   Gastrointestinal: Positive for abdominal pain and diarrhea (Mild). Negative for constipation and vomiting.   Genitourinary: Negative.    Musculoskeletal: Positive for back pain.   Skin: Negative.    Neurological: Negative.    Hematological: Negative.    Psychiatric/Behavioral: Negative.      Objective:     Vital Signs (Most Recent):  Temp: 98.2 °F (36.8 °C) (09/04/20 1501)  Pulse: 70 (09/04/20 1501)  Resp: 18 (09/04/20 1615)  BP: 139/67 (09/04/20 1501)  SpO2: (!) 94 % (09/04/20 1501) Vital Signs (24h Range):  Temp:  [96.6 °F (35.9 °C)-98.5 °F (36.9 °C)] 98.2 °F (36.8 °C)  Pulse:  [69-81] 70  Resp:  [17-18] 18  SpO2:  [92 %-96 %] 94 %  BP: (123-178)/(59-82) 139/67     Weight: 86.6 kg (191 lb)  Body mass index is 30.83 kg/m².    Intake/Output Summary (Last 24 hours) at 9/4/2020 1808  Last data filed at 9/4/2020 1732  Gross per 24 hour   Intake 3931.67 ml   Output 1000 ml   Net 2931.67 ml      Physical Exam  Vitals signs reviewed.   Constitutional:       General: She is not in acute distress.     Appearance: Normal appearance. She is not ill-appearing or toxic-appearing.   HENT:      Head: Normocephalic and atraumatic.      Right Ear: External ear normal.      Left Ear: External ear normal.      Nose: Nose normal.      Mouth/Throat:      Mouth: Mucous membranes are moist.   Eyes:      Conjunctiva/sclera: Conjunctivae normal.   Cardiovascular:      Rate and Rhythm: Normal rate and regular rhythm.   Pulmonary:      Effort: Pulmonary effort is normal. No respiratory distress.      Breath sounds: No wheezing or rales.   Abdominal:      General: Bowel sounds are normal. There is no distension.      Tenderness: There is abdominal tenderness (Periumbilical and lower quadrant tenderness).    Neurological:      Mental Status: She is alert and oriented to person, place, and time. Mental status is at baseline.   Psychiatric:         Mood and Affect: Mood normal.         Behavior: Behavior normal.         Significant Labs:   Recent Lab Results       09/04/20  0532        Albumin 4.2     Alkaline Phosphatase 82     ALT 23     Anion Gap 7     AST 18     Baso # 0.03     Basophil% 0.5     BILIRUBIN TOTAL 0.5  Comment:  For infants and newborns, interpretation of results should be based  on gestational age, weight and in agreement with clinical  observations.  Premature Infant recommended reference ranges:  Up to 24 hours.............<8.0 mg/dL  Up to 48 hours............<12.0 mg/dL  3-5 days..................<15.0 mg/dL  6-29 days.................<15.0 mg/dL       BUN, Bld 7     Calcium 8.8     Chloride 105     CO2 27     Creatinine 0.7     Differential Method Automated     eGFR if African American >60.0     eGFR if non  >60.0  Comment:  Calculation used to obtain the estimated glomerular filtration  rate (eGFR) is the CKD-EPI equation.        Eos # 0.2     Eosinophil% 3.5     Glucose 110     Gran # (ANC) 3.8     Gran% 61.0     Hematocrit 39.3     Hemoglobin 12.7     Immature Grans (Abs) 0.01  Comment:  Mild elevation in immature granulocytes is non specific and   can be seen in a variety of conditions including stress response,   acute inflammation, trauma and pregnancy. Correlation with other   laboratory and clinical findings is essential.       Immature Granulocytes 0.2     Lymph # 1.7     Lymph% 27.3     Magnesium 1.7     MCH 29.5     MCHC 32.3     MCV 91     Mono # 0.5     Mono% 7.5     MPV 9.9     nRBC 0     Phosphorus 4.0     Platelets 212     Potassium 3.6     PROTEIN TOTAL 6.8     RBC 4.31     RDW 12.7     Sodium 139     WBC 6.26         All pertinent labs within the past 24 hours have been reviewed.    Significant Imaging: I have reviewed all pertinent imaging results/findings  within the past 24 hours.      Assessment/Plan:      * Left lower quadrant pain  Direct admit from general surgery, follow-up recommendation  Possibly diverticulitis versus UTI versus nephrolithiasis  Follow-up CT abdomen/pelvis  IV fluids, IV antibiotics  Follow-up labs including CBC, CMP, lipase, ESR/CRP        Diverticulitis  Started on IV Cipro/Flagyl  Continuing IVFs  Patient with continued abdominal pain, decreased appetite  Continuing to follow recommendations of General Surgery    09/04/2020  Continuing IV Cipro/Flagyl  Continue IV fluids.    Continuing pain control  Daily labs  Following all recommendations of General surgery, appreciate their involving us in this case      Acute right-sided low back pain without sciatica  Patient with acute right-sided low back/flank pain  Suspicious for urinary tract infection versus nephrolithiasis versus musculoskeletal  Admit and follow-up CT abdomen/pelvis  Start on IV fluids and IV Cipro and Flagyl  Follow-up admit labs  IV morphine p.r.n. pain            Essential hypertension  Continue home medications      Acquired hypothyroidism  Continue home medications        VTE Risk Mitigation (From admission, onward)         Ordered     IP VTE LOW RISK PATIENT  Once      09/02/20 1555     Place sequential compression device  Until discontinued      09/02/20 1555     Place FIGUEROA hose  Until discontinued      09/02/20 1555                Discharge Planning   SHANTANU:      Code Status: Full Code   Is the patient medically ready for discharge?:     Reason for patient still in hospital (select all that apply): Consult recommendations  Discharge Plan A: Home with family                  Lashonda Iglesias MD  Department of Hospital Medicine   [facility]

## 2020-09-04 NOTE — HOSPITAL COURSE
9/3/2020  Patient admitted and started on IVFs and IV cipro and flagyl. CMP without significant electrolyte abnormalities. Vital signs have remained stable. Following recommendations of General Surgery.     9/4/2020  Patient continued on IV fluids, IV Cipro and Flagyl.  Continuing to monitor daily labs.  Vital signs are stable.  Patient with continued abdominal pain.  Following recommendations of General surgery.    09/05/2020  Continue on IV fluids, Cipro and Flagyl.  Vital signs are stable.  Continue to have some mild abdominal pain.  Follow General surgery recommendations.  On re-evaluation patient has tolerated regular diet.  She is ready to go home.  Patient has achieved the maximum benefit from hospitalization and will be discharged home to complete her course of ciprofloxacin and Flagyl.  She is instructed to take probiotics daily.  Zofran and Percocet as needed have been provided.  Follow-up with Dr. Carrillo for colonoscopy an EGD.  Follow-up with PCP in the next 2 weeks.

## 2020-09-04 NOTE — ASSESSMENT & PLAN NOTE
Started on IV Cipro/Flagyl  Continuing IVFs  Patient with continued abdominal pain, decreased appetite  Continuing to follow recommendations of General Surgery    09/04/2020  Continuing IV Cipro/Flagyl  Continue IV fluids.    Continuing pain control  Daily labs  Following all recommendations of General surgery, appreciate their involving us in this case

## 2020-09-04 NOTE — PLAN OF CARE
Problem: Adult Inpatient Plan of Care  Goal: Plan of Care Review  Outcome: Ongoing, Progressing  Goal: Patient-Specific Goal (Individualization)  Outcome: Ongoing, Progressing  Goal: Absence of Hospital-Acquired Illness or Injury  Outcome: Ongoing, Progressing  Goal: Optimal Comfort and Wellbeing  Outcome: Ongoing, Progressing  Goal: Readiness for Transition of Care  Outcome: Ongoing, Progressing  Goal: Rounds/Family Conference  Outcome: Ongoing, Progressing     Problem: Fluid Deficit (Intestinal Obstruction)  Goal: Fluid Balance  Outcome: Ongoing, Progressing     Problem: Infection (Intestinal Obstruction)  Goal: Absence of Infection Signs/Symptoms  Outcome: Ongoing, Progressing       Problem: Nausea and Vomiting (Intestinal Obstruction)  Goal: Nausea and Vomiting Relief  Outcome: Ongoing, Progressing     Problem: Pain (Intestinal Obstruction)  Goal: Acceptable Pain Control  Outcome: Ongoing, Progressing     Problem: Pain Acute  Goal: Optimal Pain Control  Outcome: Ongoing, Progressing

## 2020-09-04 NOTE — PLAN OF CARE
Problem: Adult Inpatient Plan of Care  Goal: Plan of Care Review  9/4/2020 0459 by Mariano Wilkerson RN  Outcome: Ongoing, Progressing  Flowsheets (Taken 9/4/2020 0459)  Plan of Care Reviewed With: patient  9/4/2020 0459 by Mariano Wilkerson RN  Outcome: Ongoing, Progressing  Goal: Patient-Specific Goal (Individualization)  9/4/2020 0459 by Mariano Wilkerson RN  Outcome: Ongoing, Progressing  Flowsheets (Taken 9/4/2020 0459)  Individualized Care Needs: none  Anxieties, Fears or Concerns: none  9/4/2020 0459 by Mariano Wilkerson RN  Outcome: Ongoing, Progressing  Goal: Absence of Hospital-Acquired Illness or Injury  9/4/2020 0459 by Mariano Wilkerson RN  Outcome: Ongoing, Progressing  9/4/2020 0459 by Mariano Wilkerson RN  Outcome: Ongoing, Progressing  Intervention: Identify and Manage Fall Risk  Flowsheets (Taken 9/4/2020 0459)  Safety Promotion/Fall Prevention:   side rails raised x 2   assistive device/personal item within reach  Intervention: Prevent VTE (venous thromboembolism)  Flowsheets (Taken 9/4/2020 0459)  VTE Prevention/Management: remove, assess skin and reapply compression stockings  Goal: Optimal Comfort and Wellbeing  9/4/2020 0459 by Mariano Wilkerson RN  Outcome: Ongoing, Progressing  9/4/2020 0459 by Mariano Wilkerson RN  Outcome: Ongoing, Progressing  Intervention: Provide Person-Centered Care  Flowsheets (Taken 9/4/2020 0459)  Trust Relationship/Rapport:   care explained   choices provided   emotional support provided   empathic listening provided   questions answered  Goal: Readiness for Transition of Care  9/4/2020 0459 by Mariano Wilkerson RN  Outcome: Ongoing, Progressing  9/4/2020 0459 by Mariano Wilkerson RN  Outcome: Ongoing, Progressing  Intervention: Mutually Develop Transition Plan  Flowsheets (Taken 9/4/2020 0459)  Equipment Currently Used at Home: none  Patient/Family in Agreement with Plan: yes  Able to Return to Prior Arrangements: yes  Expected Length of Stay (days): 2  Readmission Within the Last 30 Days: no previous  admission in last 30 days  Transportation Anticipated: car, drives self  Communicated expected length of stay with patient/caregiver: yes  Is patient able to care for self after discharge?: Yes  Patient currently receives home health services?: No  Goal: Rounds/Family Conference  9/4/2020 0459 by Mariano Wilkerson RN  Outcome: Ongoing, Progressing  Flowsheets (Taken 9/4/2020 0459)  Participants: patient  9/4/2020 0459 by Mariano Wilkerson RN  Outcome: Ongoing, Progressing     Problem: Fluid Deficit (Intestinal Obstruction)  Goal: Fluid Balance  9/4/2020 0459 by Mariano Wilkerson RN  Outcome: Ongoing, Progressing  9/4/2020 0459 by Mariano Wilkerson RN  Outcome: Ongoing, Progressing  Intervention: Monitor and Manage Hypovolemia  Flowsheets (Taken 9/4/2020 0459)  Fluid/Electrolyte Management: intravenous fluids adjusted     Problem: Infection (Intestinal Obstruction)  Goal: Absence of Infection Signs/Symptoms  9/4/2020 0459 by Mariano Wilkerson RN  Outcome: Ongoing, Progressing  9/4/2020 0459 by Mariano Wilkerson RN  Outcome: Ongoing, Progressing  Intervention: Prevent or Manage Infection  Flowsheets (Taken 9/4/2020 0459)  Fever Reduction/Comfort Measures: medication administered  Infection Management: aseptic technique maintained     Problem: Nausea and Vomiting (Intestinal Obstruction)  Goal: Nausea and Vomiting Relief  9/4/2020 0459 by Mariano Wilkerson RN  Outcome: Ongoing, Progressing  9/4/2020 0459 by Mariano Wilkerson RN  Outcome: Ongoing, Progressing  Intervention: Prevent and Manage Nausea and Vomiting  Flowsheets (Taken 9/4/2020 0459)  Aspiration Precautions: awake/alert before oral intake  Oral Care: teeth brushed     Problem: Pain (Intestinal Obstruction)  Goal: Acceptable Pain Control  9/4/2020 0459 by Mariano Wilkerson RN  Outcome: Ongoing, Progressing  9/4/2020 0459 by Mariano Wilkerson RN  Outcome: Ongoing, Progressing  Intervention: Monitor and Manage Pain  Flowsheets (Taken 9/4/2020 0459)  Diversional Activities: television  Pain Management  Interventions:   pain management plan reviewed with patient/caregiver   quiet environment facilitated     Problem: Pain Acute  Goal: Optimal Pain Control  9/4/2020 0459 by Mariano Wilkerson RN  Outcome: Ongoing, Progressing  9/4/2020 0459 by Mariano Wilkerson RN  Outcome: Ongoing, Progressing  Intervention: Develop Pain Management Plan  Flowsheets (Taken 9/4/2020 0459)  Pain Management Interventions:   pain management plan reviewed with patient/caregiver   quiet environment facilitated  Intervention: Prevent or Manage Pain  Flowsheets (Taken 9/4/2020 0459)  Sleep/Rest Enhancement: awakenings minimized  Sensory Stimulation Regulation:   lighting decreased   music/television provided for relaxation  Intervention: Optimize Psychosocial Wellbeing  Flowsheets (Taken 9/4/2020 0459)  Diversional Activities: television   Pt resting quietly normal unlabored breathing no c/o sob no cyanosis noted. No grimace to face no moaning noted.

## 2020-09-04 NOTE — PLAN OF CARE
09/04/20 1301   Final Note   Assessment Type Final Discharge Note   Anticipated Discharge Disposition Home   What phone number can be called within the next 1-3 days to see how you are doing after discharge? [phone]   Hospital Follow Up  Appt(s) scheduled? No   Discharge plans and expectations educations in teach back method with documentation complete? Yes   Patient states she will make her own follow up with Dr Carrillo & Lorraine Garcia when she is discharged. Denies any other needs at this time. Plan will be to go home in next day or so.

## 2020-09-04 NOTE — SUBJECTIVE & OBJECTIVE
Interval History:  No acute event    Review of Systems   Constitutional: Negative for fatigue and fever.   HENT: Negative.    Eyes: Negative for visual disturbance.   Respiratory: Negative for shortness of breath.    Cardiovascular: Negative for chest pain.   Gastrointestinal: Positive for abdominal pain and diarrhea (Mild). Negative for constipation and vomiting.   Genitourinary: Negative.    Musculoskeletal: Positive for back pain.   Skin: Negative.    Neurological: Negative.    Hematological: Negative.    Psychiatric/Behavioral: Negative.      Objective:     Vital Signs (Most Recent):  Temp: 98.2 °F (36.8 °C) (09/04/20 1501)  Pulse: 70 (09/04/20 1501)  Resp: 18 (09/04/20 1615)  BP: 139/67 (09/04/20 1501)  SpO2: (!) 94 % (09/04/20 1501) Vital Signs (24h Range):  Temp:  [96.6 °F (35.9 °C)-98.5 °F (36.9 °C)] 98.2 °F (36.8 °C)  Pulse:  [69-81] 70  Resp:  [17-18] 18  SpO2:  [92 %-96 %] 94 %  BP: (123-178)/(59-82) 139/67     Weight: 86.6 kg (191 lb)  Body mass index is 30.83 kg/m².    Intake/Output Summary (Last 24 hours) at 9/4/2020 1808  Last data filed at 9/4/2020 1732  Gross per 24 hour   Intake 3931.67 ml   Output 1000 ml   Net 2931.67 ml      Physical Exam  Vitals signs reviewed.   Constitutional:       General: She is not in acute distress.     Appearance: Normal appearance. She is not ill-appearing or toxic-appearing.   HENT:      Head: Normocephalic and atraumatic.      Right Ear: External ear normal.      Left Ear: External ear normal.      Nose: Nose normal.      Mouth/Throat:      Mouth: Mucous membranes are moist.   Eyes:      Conjunctiva/sclera: Conjunctivae normal.   Cardiovascular:      Rate and Rhythm: Normal rate and regular rhythm.   Pulmonary:      Effort: Pulmonary effort is normal. No respiratory distress.      Breath sounds: No wheezing or rales.   Abdominal:      General: Bowel sounds are normal. There is no distension.      Tenderness: There is abdominal tenderness (Periumbilical and lower  quadrant tenderness).   Neurological:      Mental Status: She is alert and oriented to person, place, and time. Mental status is at baseline.   Psychiatric:         Mood and Affect: Mood normal.         Behavior: Behavior normal.         Significant Labs:   Recent Lab Results       09/04/20  0532        Albumin 4.2     Alkaline Phosphatase 82     ALT 23     Anion Gap 7     AST 18     Baso # 0.03     Basophil% 0.5     BILIRUBIN TOTAL 0.5  Comment:  For infants and newborns, interpretation of results should be based  on gestational age, weight and in agreement with clinical  observations.  Premature Infant recommended reference ranges:  Up to 24 hours.............<8.0 mg/dL  Up to 48 hours............<12.0 mg/dL  3-5 days..................<15.0 mg/dL  6-29 days.................<15.0 mg/dL       BUN, Bld 7     Calcium 8.8     Chloride 105     CO2 27     Creatinine 0.7     Differential Method Automated     eGFR if African American >60.0     eGFR if non  >60.0  Comment:  Calculation used to obtain the estimated glomerular filtration  rate (eGFR) is the CKD-EPI equation.        Eos # 0.2     Eosinophil% 3.5     Glucose 110     Gran # (ANC) 3.8     Gran% 61.0     Hematocrit 39.3     Hemoglobin 12.7     Immature Grans (Abs) 0.01  Comment:  Mild elevation in immature granulocytes is non specific and   can be seen in a variety of conditions including stress response,   acute inflammation, trauma and pregnancy. Correlation with other   laboratory and clinical findings is essential.       Immature Granulocytes 0.2     Lymph # 1.7     Lymph% 27.3     Magnesium 1.7     MCH 29.5     MCHC 32.3     MCV 91     Mono # 0.5     Mono% 7.5     MPV 9.9     nRBC 0     Phosphorus 4.0     Platelets 212     Potassium 3.6     PROTEIN TOTAL 6.8     RBC 4.31     RDW 12.7     Sodium 139     WBC 6.26         All pertinent labs within the past 24 hours have been reviewed.    Significant Imaging: I have reviewed all pertinent imaging  results/findings within the past 24 hours.

## 2020-09-04 NOTE — ASSESSMENT & PLAN NOTE
Started on IV Cipro/Flagyl  Continuing IVFs  Patient with continued abdominal pain, decreased appetite  Continuing to follow recommendations of General Surgery

## 2020-09-05 VITALS
HEIGHT: 66 IN | RESPIRATION RATE: 18 BRPM | BODY MASS INDEX: 30.7 KG/M2 | TEMPERATURE: 98 F | WEIGHT: 191 LBS | SYSTOLIC BLOOD PRESSURE: 141 MMHG | DIASTOLIC BLOOD PRESSURE: 86 MMHG | OXYGEN SATURATION: 95 % | HEART RATE: 76 BPM

## 2020-09-05 PROCEDURE — 99213 OFFICE O/P EST LOW 20 MIN: CPT | Mod: ,,, | Performed by: SURGERY

## 2020-09-05 PROCEDURE — 25000003 PHARM REV CODE 250: Performed by: SURGERY

## 2020-09-05 PROCEDURE — 63600175 PHARM REV CODE 636 W HCPCS: Performed by: SURGERY

## 2020-09-05 PROCEDURE — 25000003 PHARM REV CODE 250: Performed by: FAMILY MEDICINE

## 2020-09-05 PROCEDURE — 99217 PR OBSERVATION CARE DISCHARGE: CPT | Mod: ,,, | Performed by: FAMILY MEDICINE

## 2020-09-05 PROCEDURE — 99213 PR OFFICE/OUTPT VISIT, EST, LEVL III, 20-29 MIN: ICD-10-PCS | Mod: ,,, | Performed by: SURGERY

## 2020-09-05 PROCEDURE — 96376 TX/PRO/DX INJ SAME DRUG ADON: CPT

## 2020-09-05 PROCEDURE — 99217 PR OBSERVATION CARE DISCHARGE: ICD-10-PCS | Mod: ,,, | Performed by: FAMILY MEDICINE

## 2020-09-05 PROCEDURE — S0030 INJECTION, METRONIDAZOLE: HCPCS | Performed by: FAMILY MEDICINE

## 2020-09-05 PROCEDURE — G0378 HOSPITAL OBSERVATION PER HR: HCPCS

## 2020-09-05 RX ORDER — CIPROFLOXACIN 500 MG/1
500 TABLET ORAL EVERY 12 HOURS
Qty: 20 TABLET | Refills: 0 | Status: SHIPPED | OUTPATIENT
Start: 2020-09-05 | End: 2020-09-05 | Stop reason: SDUPTHER

## 2020-09-05 RX ORDER — ONDANSETRON 4 MG/1
4 TABLET, ORALLY DISINTEGRATING ORAL EVERY 8 HOURS PRN
Qty: 30 TABLET | Refills: 0 | Status: SHIPPED | OUTPATIENT
Start: 2020-09-05 | End: 2020-09-05 | Stop reason: SDUPTHER

## 2020-09-05 RX ORDER — METRONIDAZOLE 500 MG/1
500 TABLET ORAL EVERY 8 HOURS
Qty: 30 TABLET | Refills: 0 | Status: SHIPPED | OUTPATIENT
Start: 2020-09-05 | End: 2020-09-15

## 2020-09-05 RX ORDER — OXYCODONE AND ACETAMINOPHEN 10; 325 MG/1; MG/1
1 TABLET ORAL EVERY 8 HOURS PRN
Qty: 20 TABLET | Refills: 0 | Status: SHIPPED | OUTPATIENT
Start: 2020-09-05 | End: 2021-04-27

## 2020-09-05 RX ORDER — ONDANSETRON 4 MG/1
4 TABLET, ORALLY DISINTEGRATING ORAL EVERY 8 HOURS PRN
Qty: 30 TABLET | Refills: 0 | Status: SHIPPED | OUTPATIENT
Start: 2020-09-05 | End: 2021-04-27

## 2020-09-05 RX ORDER — CIPROFLOXACIN 500 MG/1
500 TABLET ORAL EVERY 12 HOURS
Qty: 20 TABLET | Refills: 0 | Status: SHIPPED | OUTPATIENT
Start: 2020-09-05 | End: 2020-09-15

## 2020-09-05 RX ORDER — METRONIDAZOLE 500 MG/1
500 TABLET ORAL EVERY 8 HOURS
Qty: 30 TABLET | Refills: 0 | Status: SHIPPED | OUTPATIENT
Start: 2020-09-05 | End: 2020-09-05 | Stop reason: SDUPTHER

## 2020-09-05 RX ORDER — OXYCODONE AND ACETAMINOPHEN 10; 325 MG/1; MG/1
1 TABLET ORAL EVERY 8 HOURS PRN
Qty: 20 TABLET | Refills: 0 | Status: SHIPPED | OUTPATIENT
Start: 2020-09-05 | End: 2020-09-05 | Stop reason: SDUPTHER

## 2020-09-05 RX ADMIN — HYDROCODONE BITARTRATE AND ACETAMINOPHEN 2 TABLET: 10; 325 TABLET ORAL at 04:09

## 2020-09-05 RX ADMIN — HYDROCODONE BITARTRATE AND ACETAMINOPHEN 2 TABLET: 10; 325 TABLET ORAL at 08:09

## 2020-09-05 RX ADMIN — PANTOPRAZOLE SODIUM 40 MG: 40 TABLET, DELAYED RELEASE ORAL at 09:09

## 2020-09-05 RX ADMIN — AMLODIPINE BESYLATE 5 MG: 2.5 TABLET ORAL at 08:09

## 2020-09-05 RX ADMIN — METRONIDAZOLE 500 MG: 500 INJECTION, SOLUTION INTRAVENOUS at 03:09

## 2020-09-05 RX ADMIN — METOPROLOL SUCCINATE 100 MG: 25 TABLET, EXTENDED RELEASE ORAL at 08:09

## 2020-09-05 RX ADMIN — BACLOFEN 10 MG: 10 TABLET ORAL at 08:09

## 2020-09-05 RX ADMIN — METRONIDAZOLE 500 MG: 500 INJECTION, SOLUTION INTRAVENOUS at 12:09

## 2020-09-05 RX ADMIN — LEVOTHYROXINE SODIUM 137 MCG: 0.11 TABLET ORAL at 06:09

## 2020-09-05 RX ADMIN — HYDROCODONE BITARTRATE AND ACETAMINOPHEN 2 TABLET: 10; 325 TABLET ORAL at 12:09

## 2020-09-05 RX ADMIN — HYDROCODONE BITARTRATE AND ACETAMINOPHEN 2 TABLET: 10; 325 TABLET ORAL at 03:09

## 2020-09-05 RX ADMIN — CIPROFLOXACIN 400 MG: 2 INJECTION, SOLUTION INTRAVENOUS at 05:09

## 2020-09-05 RX ADMIN — PROMETHAZINE HYDROCHLORIDE 25 MG: 12.5 TABLET ORAL at 08:09

## 2020-09-05 NOTE — SUBJECTIVE & OBJECTIVE
Review of Systems   Constitutional: Negative for fatigue and fever.   HENT: Negative.    Eyes: Negative for visual disturbance.   Respiratory: Negative for shortness of breath.    Cardiovascular: Negative for chest pain.   Gastrointestinal: Positive for abdominal pain. Negative for constipation and vomiting.   Genitourinary: Negative.    Musculoskeletal: Positive for back pain.   Skin: Negative.    Neurological: Negative.    Hematological: Negative.    Psychiatric/Behavioral: Negative.      Objective:     Vital Signs (Most Recent):  Temp: 97.8 °F (36.6 °C) (09/05/20 0738)  Pulse: 76 (09/05/20 0738)  Resp: 18 (09/05/20 1204)  BP: (!) 141/86 (09/05/20 0738)  SpO2: 95 % (09/05/20 0738) Vital Signs (24h Range):  Temp:  [97.8 °F (36.6 °C)-99.2 °F (37.3 °C)] 97.8 °F (36.6 °C)  Pulse:  [70-79] 76  Resp:  [16-18] 18  SpO2:  [93 %-96 %] 95 %  BP: (113-145)/(51-86) 141/86     Weight: 86.6 kg (191 lb)  Body mass index is 30.83 kg/m².    Intake/Output Summary (Last 24 hours) at 9/5/2020 1336  Last data filed at 9/5/2020 0820  Gross per 24 hour   Intake 2160 ml   Output --   Net 2160 ml      Physical Exam  Vitals signs reviewed.   Constitutional:       General: She is not in acute distress.     Appearance: Normal appearance. She is not ill-appearing or toxic-appearing.   HENT:      Head: Normocephalic and atraumatic.      Right Ear: External ear normal.      Left Ear: External ear normal.      Nose: Nose normal.      Mouth/Throat:      Mouth: Mucous membranes are moist.   Eyes:      Conjunctiva/sclera: Conjunctivae normal.   Cardiovascular:      Rate and Rhythm: Normal rate and regular rhythm.   Pulmonary:      Effort: Pulmonary effort is normal. No respiratory distress.      Breath sounds: No wheezing or rales.   Abdominal:      General: Bowel sounds are normal. There is no distension.      Tenderness: There is abdominal tenderness (mild Periumbilical and lower quadrant tenderness ).   Neurological:      Mental Status: She  is alert and oriented to person, place, and time. Mental status is at baseline.   Psychiatric:         Mood and Affect: Mood normal.         Behavior: Behavior normal.         Significant Labs:   BMP:   Recent Labs   Lab 09/04/20  0532         K 3.6      CO2 27   BUN 7*   CREATININE 0.7   CALCIUM 8.8   MG 1.7     CBC:   Recent Labs   Lab 09/04/20  0532   WBC 6.26   HGB 12.7   HCT 39.3          Significant Imaging: I have reviewed all pertinent imaging results/findings within the past 24 hours.

## 2020-09-05 NOTE — DISCHARGE SUMMARY
[facility] Hospital Medicine  Discharge Summary      Patient Name: Patrica Donato  MRN: [id number]  Admission Date: 9/2/2020  Hospital Length of Stay: 0 days  Discharge Date and Time:  09/05/2020 2:18 PM  Attending Physician: Lashonda Iglesias MD   Discharging Provider: Jeremy Mckeon MD  Primary Care Provider: Lorraine Garcia NP      HPI:   Patient is a 60-year-old female with past medical history of arthritis, hypertension, hypothyroid who presents as a direct admit from general surgery clinic for left lower quadrant abdominal pain.  Patient reports that for the last 2 weeks she has had intermittent abdominal pain.  States that is periumbilical and higher but is also been in the left lower quadrant and in the right lower quadrant.  She has had an appendectomy and cholecystectomy.  Reports a 1 day history of diarrhea but otherwise no changes in bowel movements including hematochezia or melena.  Denies any fevers.  Denies any nausea or vomiting.  She also reports acute onset of right lower back pain just above the hip.  Pain worsens with movement but she can make it go away if she gets into a certain position or stay still.  Worse with bending.  She received a Toradol shot which she thought helped for 1 day but then the pain came back and was much worse which prompted her to seek care.  She was seen by Dr. Carrillo who called hospital team for admission.  We greatly appreciate the opportunity to be involved in this case.    * No surgery found *      Hospital Course:   9/3/2020  Patient admitted and started on IVFs and IV cipro and flagyl. CMP without significant electrolyte abnormalities. Vital signs have remained stable. Following recommendations of General Surgery.     9/4/2020  Patient continued on IV fluids, IV Cipro and Flagyl.  Continuing to monitor daily labs.  Vital signs are stable.  Patient with continued abdominal pain.  Following recommendations of General  surgery.    09/05/2020  Continue on IV fluids, Cipro and Flagyl.  Vital signs are stable.  Continue to have some mild abdominal pain.  Follow General surgery recommendations.  On re-evaluation patient has tolerated regular diet.  She is ready to go home.  Patient has achieved the maximum benefit from hospitalization and will be discharged home to complete her course of ciprofloxacin and Flagyl.  She is instructed to take probiotics daily.  Zofran and Percocet as needed have been provided.  Follow-up with Dr. Carrillo for colonoscopy an EGD.  Follow-up with PCP in the next 2 weeks.     Consults:     No new Assessment & Plan notes have been filed under this hospital service since the last note was generated.  Service: Hospital Medicine    Final Active Diagnoses:    Diagnosis Date Noted POA    PRINCIPAL PROBLEM:  Left lower quadrant pain [R10.32] 09/02/2020 Yes    Acute right-sided low back pain without sciatica [M54.5] 09/02/2020 Yes    Diverticulitis [K57.92]  Yes    Essential hypertension [I10] 09/17/2019 Yes    Acquired hypothyroidism [E03.9] 10/02/2018 Yes      Problems Resolved During this Admission:       Discharged Condition: good    Disposition: Home or Self Care    Follow Up:  Follow-up Information     Lorraine Garcia NP In 2 weeks.    Specialty: Family Medicine  Contact information:  [address] [facility]  [address]ND FLOOR  [place] [address] [phone]             Ovidio Carrillo MD.    Specialty: General Surgery  Contact information:  [address] [facility] GENERAL SURG [facility] [place] [id number] [phone]                 Patient Instructions:      Diet Adult Regular     Other restrictions (specify):   Order Comments: Take over-the-counter probiotic daily for the next 30 days     Activity as tolerated       Significant Diagnostic Studies: Labs:   CMP   Recent Labs   Lab 09/04/20  0532      K 3.6      CO2 27      BUN 7*   CREATININE 0.7   CALCIUM 8.8   PROT 6.8   ALBUMIN 4.2    BILITOT 0.5   ALKPHOS 82   AST 18   ALT 23   ANIONGAP 7*   ESTGFRAFRICA >60.0   EGFRNONAA >60.0    and CBC   Recent Labs   Lab 09/04/20  0532   WBC 6.26   HGB 12.7   HCT 39.3        CT ABDOMEN PELVIS WITH CONTRAST   Final Result   Abnormal      1. Prior cholecystectomy, appendectomy and hysterectomy.   2. There is relatively mild diverticulosis.  There is very subtle stranding however in the pericolonic fat near the hepatic flexure raising the question of uncomplicated diverticulitis.  There is no bowel obstruction.  There is no free intraperitoneal air or fluid.  There is no obvious bowel wall thickening.   3. Fatty liver.   This report was flagged in Epic as abnormal.         Electronically signed by: David Aviles MD   Date:    09/02/2020   Time:    18:26      X-Ray Chest 1 View   Final Result      No acute abnormality.         Electronically signed by: Maurice Chandra   Date:    09/02/2020   Time:    16:48            Pending Diagnostic Studies:     None         Medications:  Reconciled Home Medications:      Medication List      START taking these medications    ciprofloxacin HCl 500 MG tablet  Commonly known as: CIPRO  Take 1 tablet (500 mg total) by mouth every 12 (twelve) hours. for 10 days     metroNIDAZOLE 500 MG tablet  Commonly known as: FLAGYL  Take 1 tablet (500 mg total) by mouth every 8 (eight) hours. for 10 days     ondansetron 4 MG Tbdl  Commonly known as: ZOFRAN-ODT  Take 1 tablet (4 mg total) by mouth every 8 (eight) hours as needed (nausea).     oxyCODONE-acetaminophen  mg per tablet  Commonly known as: PERCOCET  Take 1 tablet by mouth every 8 (eight) hours as needed for Pain.        CONTINUE taking these medications    amLODIPine 5 MG tablet  Commonly known as: NORVASC  Take 1 tablet (5 mg total) by mouth once daily.     baclofen 10 MG tablet  Commonly known as: LIORESAL  Take 1 tablet (10 mg total) by mouth 3 (three) times daily.     levothyroxine 137 MCG Tab tablet  Commonly  known as: SYNTHROID  Take 1 tablet (137 mcg total) by mouth before breakfast.     metoprolol succinate 100 MG 24 hr tablet  Commonly known as: TOPROL-XL  Take 1 tablet (100 mg total) by mouth once daily.     pantoprazole 40 MG tablet  Commonly known as: PROTONIX  Take 1 tablet (40 mg total) by mouth once daily. Take in the morning before breakfast.  Wait 30 minutes before eating or drinking anything     pravastatin 20 MG tablet  Commonly known as: PRAVACHOL  Take 1 tablet (20 mg total) by mouth once daily.     SUPREP BOWEL PREP KIT 17.5-3.13-1.6 gram Solr  Generic drug: sodium,potassium,mag sulfates  Follow written instructions provided by Clinic     VITAMIN D-3 ORAL  Take by mouth.            Indwelling Lines/Drains at time of discharge:   Lines/Drains/Airways     None                 Time spent on the discharge of patient: 20 minutes  Patient was seen and examined on the date of discharge and determined to be suitable for discharge.         Jeremy Mckeon MD  Department of Hospital Medicine  [facility]

## 2020-09-05 NOTE — NURSING
New orders received for discharge, patient is agreeable with discharge. PIV removed, catheter tip intact. Dressing applied. Discharge teaching done at bedside, verbalized understanding. Presciptions given to patient to be filled at their pharmacy of choice. Medications reviewed, appointments given. Will d/c home with all belongings per w/c.

## 2020-09-05 NOTE — PROGRESS NOTES
[facility]  General Surgery  Progress Note  09/05/2020    Patient Name: Patrica Donato  MRN: [id number]  Admission Date: 9/2/2020  Hospital Day:  4    Antibiotics:  Cipro/Flagyl - Day 4    Interval History:  Feels markedly improved.  Tolerating liquids.  No nausea or vomiting.  Passing stool and flatus.  Pain improved but not completely resolved.  Mobility good.    Vital Signs:  Afebrile.  Good vital signs.  Good room air oxygen saturations.    I/O:  Inaccurate  UOP:  Good    Exam:   Gen - Comfortable.  Nontoxic.  No acute distress.  HEENT - Normocephalic.  Atraumatic.  Pupils equal and round.  Sclerae anicteric.  Nares patent without discharge.  Neck - Trachea midline. No masses.  No JVD.  No bruits.  CV - Regular rate and rhythm.  Good perfusion.  Intact distal pulses.  No Edema.  Pulm - Clear to auscultation.  Nonlabored.  No rales, wheezes, rhonci.  Abdomen - Soft.  Mild diffuse tenderness most prominent lateral right upper quadrant..  Nondistended.  Normoactive normopitched bowel sounds.  No guarding/rebound.  Extremities - No edema.  No cords.  No tenderness.  Integument - No rashes.  No lesions.  No jaundice.  No decubiti  Lymphatics - No adenopathy; cervical, supraclavicular, axillary, inguinal  Neuro - No focal deficits.  GCS 15.  Alert and oriented to person, time, and place.    Lab Results:  No new lab results.    Radiology Results:  No new imaging results    Assessment:  Clinically stable.  Diverticulitis.  Minimal improvement.    Recommendations:  Advance to regular diet.  Discharge home today if tolerates regular diet without increased pain.  Discharge with Cipro 500 mg orally twice daily for 10 days.  Discharge with Flagyl 500 mg orally 3 times daily for 10 days.  Discharge with Percocet 10 every 8 hr as needed for pain, dispense 20 with no refills.  Discharge with Zofran 4 mg orally every 6 hr as needed for nausea, dispense 30 with no refills.  Discharge with  instructions to take an oral over-the-counter probiotic daily for the next 30 days.    Plan:  Will follow while hospitalized.  Anticipate outpatient EGD and colonoscopy as already scheduled.  Further management will depend upon clinical course.        Ovidio Carrillo MD FACS

## 2020-09-08 NOTE — UM SECONDARY REVIEW
Per secondary review by Dr Doherty, patient should be observation and discharged with in 24-48 hrs./MARIVEL Lyles

## 2020-09-18 DIAGNOSIS — Z01.84 ANTIBODY RESPONSE EXAMINATION: ICD-10-CM

## 2020-09-21 ENCOUNTER — LAB VISIT (OUTPATIENT)
Dept: FAMILY MEDICINE | Facility: CLINIC | Age: 60
End: 2020-09-21
Payer: COMMERCIAL

## 2020-09-21 DIAGNOSIS — Z12.11 ENCOUNTER FOR SCREENING COLONOSCOPY: ICD-10-CM

## 2020-09-21 PROCEDURE — U0003 INFECTIOUS AGENT DETECTION BY NUCLEIC ACID (DNA OR RNA); SEVERE ACUTE RESPIRATORY SYNDROME CORONAVIRUS 2 (SARS-COV-2) (CORONAVIRUS DISEASE [COVID-19]), AMPLIFIED PROBE TECHNIQUE, MAKING USE OF HIGH THROUGHPUT TECHNOLOGIES AS DESCRIBED BY CMS-2020-01-R: HCPCS

## 2020-09-22 LAB — SARS-COV-2 RNA RESP QL NAA+PROBE: NOT DETECTED

## 2020-09-23 NOTE — H&P
[facility] - [facility]  General Surgery  H&P  9/24/2020    Patient Name: Patrica Donato  MRN: [id number]     Chief Complaint:  I am here for a screening/wellness colonoscopy and an EGD      HPI:  Ms. Donato presents today to proceed with a screening colonoscopy and a EGD.  She was seen in the Surgery Clinic on 8/31/2020  with a 2 month history of right upper quadrant abdominal pain that radiates through to her thoracolumbar back.  She is also experiencing epigastric burning pain that radiates retrosternally with regurgitation of bitter fluid.  No dysphagia.  No weight loss.  No fever or chills.  No coughing or choking.  No melena, hematochezia, hematemesis.  No change in bowel habits or stool characteristics.  No weight loss.  No other associated symptoms.  She is 11 years overdue for a screening colonoscopy.  No family history of colon cancer.  She has a history of hypothyroidism.  Most recent laboratory studies indicate adequate thyroid replacement therapy.      Allergies & Meds:     Allergen Reactions    Cerave [ceramides 1,3,6-11]        Current Outpatient Medications   Medication Sig Dispense Refill    amLODIPine (NORVASC) 5 MG tablet Take 1 tablet (5 mg total) by mouth once daily. 90 tablet 3    CALCIUM CARBONATE/VITAMIN D3 (VITAMIN D-3 ORAL) Take by mouth.      levothyroxine (SYNTHROID) 137 MCG Tab tablet Take 1 tablet (137 mcg total) by mouth before breakfast. 90 tablet 3    metoprolol succinate (TOPROL-XL) 100 MG 24 hr tablet Take 1 tablet (100 mg total) by mouth once daily. 90 tablet 3    pravastatin (PRAVACHOL) 20 MG tablet Take 1 tablet (20 mg total) by mouth once daily. 90 tablet 3    sodium,potassium,mag sulfates (SUPREP BOWEL PREP KIT) 17.5-3.13-1.6 gram SolR Follow written instructions provided by Clinic 2 Bottle 0         PMFSHx:  Past Medical History:   Diagnosis Date    Arthritis     Hypertension     Thyroid disease        Past Surgical History:    Procedure Laterality Date    ANKLE SURGERY      APPENDECTOMY      CHOLECYSTECTOMY      HAND SURGERY      finger     HYSTERECTOMY      KNEE ARTHROSCOPY      LAPAROSCOPIC CHOLECYSTECTOMY N/A 10/2/2018    Procedure: CHOLECYSTECTOMY-LAPAROSCOPIC;  Surgeon: Ovidio Carrillo MD;  [facility];  Service: General;  Laterality: N/A;    TONSILLECTOMY  1965       Family History   Problem Relation Age of Onset    Dementia Mother     Cancer Father        Social History     Tobacco Use    Smoking status: Former Smoker     Quit date: 2015     Years since quittin.6    Smokeless tobacco: Never Used   Substance Use Topics    Alcohol use: Yes     Alcohol/week: 2.0 standard drinks     Types: 2 Glasses of wine per week    Drug use: Never       Review of Systems   Constitutional: Negative for appetite change, chills, fatigue, fever and unexpected weight change.   HENT: Negative for congestion, dental problem, ear pain, mouth sores, postnasal drip, rhinorrhea, sore throat, tinnitus, trouble swallowing and voice change.    Eyes: Negative for photophobia, pain, discharge and visual disturbance.   Respiratory: Negative for cough, chest tightness, shortness of breath and wheezing.    Cardiovascular: Negative for chest pain, palpitations and leg swelling.   Gastrointestinal: Negative for abdominal distention, anal bleeding, blood in stool, constipation, diarrhea, nausea, rectal pain and vomiting.   Endocrine: Negative for cold intolerance, heat intolerance, polydipsia, polyphagia and polyuria.   Genitourinary: Negative for difficulty urinating, dysuria, flank pain, frequency, hematuria and urgency.   Musculoskeletal: Negative for arthralgias, joint swelling and myalgias.   Skin: Negative for color change and rash.   Allergic/Immunologic: Negative for immunocompromised state.   Neurological: Negative for dizziness, tremors, seizures, syncope, speech difficulty, weakness, numbness and headaches.    Hematological: Negative for adenopathy. Does not bruise/bleed easily.   Psychiatric/Behavioral: Negative for agitation, confusion, hallucinations, self-injury and suicidal ideas. The patient is not nervous/anxious.             Physical Exam  Constitutional:       General: She is not in acute distress.     Appearance: Normal appearance. She is well-developed. She is not toxic-appearing.      Comments: Body mass index is 30.46 kg/m².   HENT:      Head: Normocephalic and atraumatic.      Right Ear: Hearing and external ear normal. No drainage or tenderness.      Left Ear: Hearing and external ear normal. No drainage or tenderness.      Nose: Nose normal. No rhinorrhea.      Mouth/Throat:      Mouth: Mucous membranes are not pale, not dry and not cyanotic.      Pharynx: Uvula midline. No oropharyngeal exudate.   Eyes:      General: Lids are normal.         Right eye: No discharge.         Left eye: No discharge.      Extraocular Movements:      Right eye: No nystagmus.      Left eye: No nystagmus.      Conjunctiva/sclera: Conjunctivae normal.      Right eye: Right conjunctiva is not injected. No exudate.     Left eye: No exudate.     Pupils: Pupils are equal, round, and reactive to light.   Neck:      Musculoskeletal: Full passive range of motion without pain.      Thyroid: No thyroid mass or thyromegaly.      Vascular: No carotid bruit or JVD.      Trachea: Trachea and phonation normal. No tracheal deviation.   Cardiovascular:      Rate and Rhythm: Normal rate and regular rhythm.      Chest Wall: PMI is not displaced.      Heart sounds: Normal heart sounds, S1 normal and S2 normal. No murmur. No friction rub. No gallop.    Pulmonary:      Effort: Pulmonary effort is normal. No accessory muscle usage or respiratory distress.      Breath sounds: Normal breath sounds.   Chest:      Chest wall: No mass, tenderness or crepitus.      Breasts: Breasts are symmetrical.         Right: No inverted nipple, mass, nipple  discharge, skin change or tenderness.         Left: No inverted nipple, mass, nipple discharge, skin change or tenderness.   Abdominal:      General: Bowel sounds are normal. There is no distension or abdominal bruit.      Palpations: Abdomen is soft. There is no fluid wave or mass.      Tenderness: There is no abdominal tenderness. There is no guarding or rebound. Negative signs include Rust's sign.      Hernia: No hernia is present. There is no hernia in the left inguinal area.   Genitourinary:     Labia:         Right: No rash or lesion.         Left: No rash or lesion.       Vagina: Normal. No vaginal discharge.      Adnexa:         Right: No mass.          Left: No mass.        Rectum: Normal.   Musculoskeletal:      Cervical back: Normal.      Thoracic back: Normal.      Lumbar back: Normal.      Right upper arm: Normal.      Left upper arm: Normal.      Right forearm: Normal.      Left forearm: Normal.      Right hand: Normal.      Left hand: Normal.      Right upper leg: Normal.      Left upper leg: Normal.      Right lower leg: Normal.      Left lower leg: Normal.      Right foot: Normal.      Left foot: Normal.   Lymphadenopathy:      Head:      Right side of head: No submental, submandibular or posterior auricular adenopathy.      Left side of head: No submental, submandibular or posterior auricular adenopathy.      Cervical: No cervical adenopathy.      Upper Body:      Right upper body: No supraclavicular adenopathy.      Left upper body: No supraclavicular adenopathy.   Skin:     General: Skin is warm and dry.      Findings: No rash.      Nails: There is no clubbing.   Neurological:      Mental Status: She is alert and oriented to person, place, and time.      GCS: GCS eye subscore is 4. GCS verbal subscore is 5. GCS motor subscore is 6.      Cranial Nerves: No cranial nerve deficit.      Sensory: No sensory deficit.      Coordination: Coordination normal.      Gait: Gait normal.   Psychiatric:          Mood and Affect: Mood is not anxious or depressed. Affect is not inappropriate.         Speech: Speech normal.         Thought Content: Thought content normal.         Judgment: Judgment normal.             Medical Records Review:  Lab results reviewed from 9/4/2020.  CBC revealed no evidence of leukocytosis, anemia, or thrombocytopenia.  Electrolytes were all in the range of normal.  BUN and creatinine showed no evidence of renal dysfunction.  Glucose showed no suspicion diabetes.  Liver profile showed no evidence of hepatocellular disease or biliary obstruction.    Lab results reviewed from 9/2/2020.  CBC showed no evidence of leukocytosis, anemia, or thrombocytopenia.  Electrolytes were all in the range of normal.  BUN and creatinine showed no evidence of renal dysfunction.  Glucose showed no suspicion diabetes.  Liver profile showed no evidence of hepatocellular disease or biliary obstruction.  Lipase showed no evidence of pancreatitis.  ESR and CRP were both in the range of normal.  Urinalysis showed no significant abnormalities.    Routine COVID-19 screening was negative on 9/21/2020.    EKG reviewed from 9/2/2020.  Twelve lead tracing showed sinus rhythm and no evidence of any ischemic changes.    CT scan of the abdomen pelvis films and report were reviewed from 9/2/2020.  Study was performed with contrast.  Postoperative changes were evident.  There was also evidence of diverticulosis and mild diverticulitis in the vicinity of the hepatic flexure.  Hepatic steatosis was present.  Assessment:       Multifocal/generalized abdominal pain.  Gastroesophageal reflux disease.  Due for screening colonoscopy.  Right upper quadrant abdominal pain.  Previous cholecystectomy.  Differential diagnosis includes but is not limited to esophageal neoplasm, esophagitis, esophageal ulcer, gastroesophageal reflux disease, gastritis, gastric ulcer, gastric cancer, duodenitis, duodenal ulcer, inflammatory bowel disease,  diverticulosis, hemorrhoids, gastrointestinal angiodysplasias, benign gastrointestinal neoplasm, colon cancer, etc.  Options at this time include but are not limited to endoscopy, second opinion, capsule endoscopy, radiologic studies, observation, etc.      1. Generalized abdominal pain    2. Gastroesophageal reflux disease, esophagitis presence not specified    3. Encounter for screening colonoscopy          Plan:     Generalized abdominal pain    Gastroesophageal reflux disease, esophagitis presence not specified    Encounter for screening colonoscopy  -     Case Request Operating Room: COLONOSCOPY - screening, high risk screening, or diagnostic.  (See H&P), ESOPHAGOGASTRODUODENOSCOPY (EGD)        Follow up around 10/14/2020 for routine post-endosocpy visit.    Counseling/Medical Decision Making:  Patrica Donato was counseled regarding the results of the evaluation thus far and the differential diagnosis.  Diagnostic and therapeutic options were discussed in detail along with the risks, benefits, possible complications, details of, and indications for each option.  Diagnoses discussed included but were not limited to: GB disease, GERD, hiatal hernia, PUD, gastritis, duodenitis, Sphincter of Oddi dysfunction, hemorrhoids, diverticulosis, cancer, IBD, IBS, benign tumors, angiodysplasias, ischemic disease.  Options discussed included but were not limited to: EGD, colonoscopy, proctoscopy, anoscopy, sigmoidoscopy, radiologic studies, PillCam, empiric therapy, second opinion, etc. Possible complications of endoscopy discussed included but were not limited to: bleeding, infections, endocarditis, injury to internal organs, incomplete exam, failure to diagnose cancer or other serious condition, need for emergency surgery, need for a temporary colostomy, need for additional operations or procedures, etc.  Entire conversation was held in layman's terms.  All unfamiliar terms were defined.  Questions solicited and  answered.  I read the consent form to her verbatim.  [facility] booklets were provided on EGD, GERD, GB disease / surgery, colonoscopy, polyps, diverticulosis, hemorrhoids, cancer, etc.  A copy of the consent form was provided for her review outside the office / hospital prior to the procedures.  At the conclusion of the conversation she voiced complete understanding of all we discussed and satisfaction that all of her questions had been answered to her full understanding.  She stated that she felt fully informed and completely capable of making an informed decision.  She requests and desires to proceed with EGD and colonoscopy.    No evident contraindication to proceeding with planned endoscopy today.    Patrica Donato was interviewed and examined again today preoperatively, H&P updated, consent completed, and she is ready to roll into the operating/procedure room at 0715

## 2020-09-24 ENCOUNTER — ANESTHESIA (OUTPATIENT)
Dept: SURGERY | Facility: HOSPITAL | Age: 60
End: 2020-09-24
Payer: COMMERCIAL

## 2020-09-24 ENCOUNTER — HOSPITAL ENCOUNTER (OUTPATIENT)
Facility: HOSPITAL | Age: 60
Discharge: HOME | End: 2020-09-24
Attending: SURGERY | Admitting: SURGERY
Payer: COMMERCIAL

## 2020-09-24 ENCOUNTER — ANESTHESIA EVENT (OUTPATIENT)
Dept: SURGERY | Facility: HOSPITAL | Age: 60
End: 2020-09-24
Payer: COMMERCIAL

## 2020-09-24 VITALS
HEART RATE: 86 BPM | OXYGEN SATURATION: 99 % | HEIGHT: 66 IN | SYSTOLIC BLOOD PRESSURE: 156 MMHG | DIASTOLIC BLOOD PRESSURE: 78 MMHG | RESPIRATION RATE: 20 BRPM | BODY MASS INDEX: 30.22 KG/M2 | WEIGHT: 188 LBS | TEMPERATURE: 98 F

## 2020-09-24 DIAGNOSIS — Z12.11 ENCOUNTER FOR SCREENING COLONOSCOPY: Primary | ICD-10-CM

## 2020-09-24 DIAGNOSIS — R10.84 GENERALIZED ABDOMINAL PAIN: ICD-10-CM

## 2020-09-24 PROBLEM — K29.50 CHRONIC GASTRITIS: Status: ACTIVE | Noted: 2020-09-24

## 2020-09-24 PROBLEM — K29.80 DUODENITIS: Status: ACTIVE | Noted: 2020-09-24

## 2020-09-24 PROBLEM — D12.3 BENIGN NEOPLASM OF TRANSVERSE COLON: Status: ACTIVE | Noted: 2020-09-24

## 2020-09-24 PROBLEM — K57.30 DIVERTICULOSIS OF COLON: Status: ACTIVE | Noted: 2020-09-24

## 2020-09-24 PROBLEM — K31.7 GASTRIC POLYPS: Status: ACTIVE | Noted: 2020-09-24

## 2020-09-24 PROCEDURE — 45384 PR COLONOSCOPY,REMV LESN,FORCEP/CAUTERY: ICD-10-PCS | Mod: 33,,, | Performed by: SURGERY

## 2020-09-24 PROCEDURE — 25000003 PHARM REV CODE 250: Performed by: NURSE ANESTHETIST, CERTIFIED REGISTERED

## 2020-09-24 PROCEDURE — D9220A PRA ANESTHESIA: ICD-10-PCS | Mod: 33,,, | Performed by: ANESTHESIOLOGY

## 2020-09-24 PROCEDURE — 63600175 PHARM REV CODE 636 W HCPCS: Performed by: NURSE ANESTHETIST, CERTIFIED REGISTERED

## 2020-09-24 PROCEDURE — 27201423 OPTIME MED/SURG SUP & DEVICES STERILE SUPPLY: Performed by: SURGERY

## 2020-09-24 PROCEDURE — 88305 TISSUE EXAM BY PATHOLOGIST: ICD-10-PCS | Mod: 26,,, | Performed by: STUDENT IN AN ORGANIZED HEALTH CARE EDUCATION/TRAINING PROGRAM

## 2020-09-24 PROCEDURE — 43251 EGD REMOVE LESION SNARE: CPT | Mod: 51,,, | Performed by: SURGERY

## 2020-09-24 PROCEDURE — 88342 CHG IMMUNOCYTOCHEMISTRY: ICD-10-PCS | Mod: 26,,, | Performed by: STUDENT IN AN ORGANIZED HEALTH CARE EDUCATION/TRAINING PROGRAM

## 2020-09-24 PROCEDURE — 88342 IMHCHEM/IMCYTCHM 1ST ANTB: CPT | Mod: 26,,, | Performed by: STUDENT IN AN ORGANIZED HEALTH CARE EDUCATION/TRAINING PROGRAM

## 2020-09-24 PROCEDURE — 45384 COLONOSCOPY W/LESION REMOVAL: CPT | Mod: 33,,, | Performed by: SURGERY

## 2020-09-24 PROCEDURE — 63600175 PHARM REV CODE 636 W HCPCS: Performed by: SURGERY

## 2020-09-24 PROCEDURE — 43239 EGD BIOPSY SINGLE/MULTIPLE: CPT | Performed by: SURGERY

## 2020-09-24 PROCEDURE — 43239 EGD BIOPSY SINGLE/MULTIPLE: CPT | Mod: 59,,, | Performed by: SURGERY

## 2020-09-24 PROCEDURE — 25000003 PHARM REV CODE 250

## 2020-09-24 PROCEDURE — S0028 INJECTION, FAMOTIDINE, 20 MG: HCPCS

## 2020-09-24 PROCEDURE — 88305 TISSUE EXAM BY PATHOLOGIST: CPT | Mod: 26,,, | Performed by: STUDENT IN AN ORGANIZED HEALTH CARE EDUCATION/TRAINING PROGRAM

## 2020-09-24 PROCEDURE — 43239 PR EGD, FLEX, W/BIOPSY, SGL/MULTI: ICD-10-PCS | Mod: 59,,, | Performed by: SURGERY

## 2020-09-24 PROCEDURE — 37000009 HC ANESTHESIA EA ADD 15 MINS: Performed by: SURGERY

## 2020-09-24 PROCEDURE — D9220A PRA ANESTHESIA: Mod: 33,,, | Performed by: ANESTHESIOLOGY

## 2020-09-24 PROCEDURE — 43251 EGD REMOVE LESION SNARE: CPT | Performed by: SURGERY

## 2020-09-24 PROCEDURE — 45384 COLONOSCOPY W/LESION REMOVAL: CPT | Performed by: SURGERY

## 2020-09-24 PROCEDURE — 43251 PR EGD, FLEX, W/REMOVAL, TUMOR/POLYP/LESION(S), SNARE: ICD-10-PCS | Mod: 51,,, | Performed by: SURGERY

## 2020-09-24 PROCEDURE — 88342 IMHCHEM/IMCYTCHM 1ST ANTB: CPT | Performed by: STUDENT IN AN ORGANIZED HEALTH CARE EDUCATION/TRAINING PROGRAM

## 2020-09-24 PROCEDURE — 37000008 HC ANESTHESIA 1ST 15 MINUTES: Performed by: SURGERY

## 2020-09-24 PROCEDURE — 88305 TISSUE EXAM BY PATHOLOGIST: CPT | Mod: 59 | Performed by: STUDENT IN AN ORGANIZED HEALTH CARE EDUCATION/TRAINING PROGRAM

## 2020-09-24 RX ORDER — LIDOCAINE HYDROCHLORIDE 20 MG/ML
INJECTION, SOLUTION EPIDURAL; INFILTRATION; INTRACAUDAL; PERINEURAL
Status: DISCONTINUED | OUTPATIENT
Start: 2020-09-24 | End: 2020-09-24

## 2020-09-24 RX ORDER — DIPHENHYDRAMINE HYDROCHLORIDE 50 MG/ML
12.5 INJECTION INTRAMUSCULAR; INTRAVENOUS
Status: DISCONTINUED | OUTPATIENT
Start: 2020-09-24 | End: 2020-09-24 | Stop reason: HOSPADM

## 2020-09-24 RX ORDER — ONDANSETRON 2 MG/ML
4 INJECTION INTRAMUSCULAR; INTRAVENOUS DAILY PRN
Status: DISCONTINUED | OUTPATIENT
Start: 2020-09-24 | End: 2020-09-24 | Stop reason: HOSPADM

## 2020-09-24 RX ORDER — SODIUM CHLORIDE, SODIUM LACTATE, POTASSIUM CHLORIDE, CALCIUM CHLORIDE 600; 310; 30; 20 MG/100ML; MG/100ML; MG/100ML; MG/100ML
125 INJECTION, SOLUTION INTRAVENOUS CONTINUOUS
Status: DISCONTINUED | OUTPATIENT
Start: 2020-09-24 | End: 2020-09-24 | Stop reason: HOSPADM

## 2020-09-24 RX ORDER — MIDAZOLAM HYDROCHLORIDE 1 MG/ML
INJECTION, SOLUTION INTRAMUSCULAR; INTRAVENOUS
Status: DISCONTINUED | OUTPATIENT
Start: 2020-09-24 | End: 2020-09-24

## 2020-09-24 RX ORDER — FAMOTIDINE 10 MG/ML
20 INJECTION INTRAVENOUS ONCE
Status: CANCELLED | OUTPATIENT
Start: 2020-09-24 | End: 2020-09-24

## 2020-09-24 RX ORDER — FAMOTIDINE 10 MG/ML
INJECTION INTRAVENOUS
Status: COMPLETED
Start: 2020-09-24 | End: 2020-09-24

## 2020-09-24 RX ORDER — PROPOFOL 10 MG/ML
VIAL (ML) INTRAVENOUS
Status: DISCONTINUED | OUTPATIENT
Start: 2020-09-24 | End: 2020-09-24

## 2020-09-24 RX ORDER — SODIUM CHLORIDE, SODIUM LACTATE, POTASSIUM CHLORIDE, CALCIUM CHLORIDE 600; 310; 30; 20 MG/100ML; MG/100ML; MG/100ML; MG/100ML
INJECTION, SOLUTION INTRAVENOUS CONTINUOUS
Status: DISCONTINUED | OUTPATIENT
Start: 2020-09-24 | End: 2020-09-24 | Stop reason: HOSPADM

## 2020-09-24 RX ORDER — LIDOCAINE HYDROCHLORIDE 10 MG/ML
1 INJECTION, SOLUTION EPIDURAL; INFILTRATION; INTRACAUDAL; PERINEURAL ONCE
Status: DISCONTINUED | OUTPATIENT
Start: 2020-09-24 | End: 2020-09-24 | Stop reason: HOSPADM

## 2020-09-24 RX ADMIN — PROPOFOL 30 MG: 10 INJECTION, EMULSION INTRAVENOUS at 07:09

## 2020-09-24 RX ADMIN — FAMOTIDINE 20 MG: 10 INJECTION INTRAVENOUS at 06:09

## 2020-09-24 RX ADMIN — LIDOCAINE HYDROCHLORIDE 50 MG: 20 INJECTION, SOLUTION EPIDURAL; INFILTRATION; INTRACAUDAL; PERINEURAL at 07:09

## 2020-09-24 RX ADMIN — PROPOFOL 30 MG: 10 INJECTION, EMULSION INTRAVENOUS at 08:09

## 2020-09-24 RX ADMIN — MIDAZOLAM HYDROCHLORIDE 1 MG: 1 INJECTION, SOLUTION INTRAMUSCULAR; INTRAVENOUS at 07:09

## 2020-09-24 RX ADMIN — SODIUM CHLORIDE, POTASSIUM CHLORIDE, SODIUM LACTATE AND CALCIUM CHLORIDE: 600; 310; 30; 20 INJECTION, SOLUTION INTRAVENOUS at 07:09

## 2020-09-24 NOTE — DISCHARGE SUMMARY
[facility]  Discharge Note  Short Stay    Procedure(s) (LRB):  COLONOSCOPY - screening, high risk screening, or diagnostic.  (See H&P) (N/A)  ESOPHAGOGASTRODUODENOSCOPY (EGD) (N/A)    OUTCOME: Patient tolerated treatment/procedure well without complication and is now ready for discharge.    DISPOSITION: Home or Self Care    FINAL DIAGNOSIS:  Encounter for screening colonoscopy    FOLLOWUP: In clinic    DISCHARGE INSTRUCTIONS:    Discharge Procedure Orders   Diet Adult Regular     No driving until:     Order Specific Question Answer Comments   Date: 9/25/2020

## 2020-09-24 NOTE — PLAN OF CARE
"PACU monitors removed. Ambulating to bathroom. Steady gait observed. Clothing placed in bathroom. Call light cord placed in reach. Instructed on use. V/u. Advised I will call her ride. States, "I will call him from my cell phone."  "

## 2020-09-24 NOTE — ANESTHESIA POSTPROCEDURE EVALUATION
Anesthesia Post Evaluation    Patient: Patrica Donato    Procedure(s) Performed: Procedure(s) (LRB):  COLONOSCOPY - screening, high risk screening, or diagnostic.  (See H&P) (N/A)  ESOPHAGOGASTRODUODENOSCOPY (EGD) (N/A)    Final Anesthesia Type: general    Patient location during evaluation: PACU  Patient participation: Yes- Able to Participate  Level of consciousness: awake and alert  Post-procedure vital signs: reviewed and stable  Pain management: adequate  Airway patency: patent    PONV status at discharge: No PONV  Anesthetic complications: no      Cardiovascular status: blood pressure returned to baseline  Respiratory status: unassisted  Hydration status: euvolemic  Follow-up not needed.          Vitals Value Taken Time   /78 09/24/20 0908   Temp 36.7 °C (98 °F) 09/24/20 0817   Pulse 86 09/24/20 0905   Resp 20 09/24/20 0905   SpO2 99 % 09/24/20 0905         Event Time   Out of Recovery 08:40:00         Pain/Omkar Score: Omkar Score: 9 (9/24/2020  8:30 AM)  Modified Omkar Score: 20 (9/24/2020  9:14 AM)

## 2020-09-24 NOTE — PLAN OF CARE
Has met unit/department guidelines for discharge from each phase of the post procedure continuum. Leaving floor per w/c. AAO x3. Resp even and unlabored room air. No distress noted. All personal belongings returned to pt.

## 2020-09-24 NOTE — DISCHARGE INSTRUCTIONS
[facility]   [phone]  [facility]      [phone]    Managing nausea    Some people have an upset stomach after surgery. This is often because of anesthesia, pain, or pain medicine, or the stress of surgery. These tips will help you handle nausea and eat healthy foods as you get better. If you were on a special food plan before surgery, ask your healthcare provider if you should follow it while you get better. These tips may help:  · Do not push yourself to eat. Your body will tell you when to eat and how much.  · Start off with clear liquids and soup. They are easier to digest.  · Next try semi-solid foods, such as mashed potatoes, applesauce, and gelatin, as you feel ready.  · Slowly move to solid foods. Dont eat fatty, rich, or spicy foods at first.  · Do not force yourself to have 3 large meals a day. Instead eat smaller amounts more often.  · Take pain medicines with a small amount of solid food, such as crackers or toast, to avoid nausea.

## 2020-09-24 NOTE — ANESTHESIA PREPROCEDURE EVALUATION
09/24/2020  Patrica Donato is a 60 y.o., female.    Anesthesia Evaluation    I have reviewed the Patient Summary Reports.    I have reviewed the Nursing Notes. I have reviewed the NPO Status.   I have reviewed the Medications.     Review of Systems  Hematology/Oncology:  Hematology Normal   Oncology Normal     EENT/Dental:EENT/Dental Normal   Cardiovascular:   Hypertension    Pulmonary:  Pulmonary Normal    Renal/:  Renal/ Normal     Hepatic/GI:  Hepatic/GI Normal    Musculoskeletal:   Arthritis     Neurological:  Neurology Normal    Endocrine:   Hypothyroidism        Physical Exam  General:  Well nourished    Airway/Jaw/Neck:  Airway Findings: Mouth Opening: Normal Tongue: Normal  General Airway Assessment: Adult  Mallampati: II  TM Distance: Normal, at least 6 cm       Chest/Lungs:  Chest/Lungs Findings: Clear to auscultation     Heart/Vascular:  Heart Findings: Rate: Normal  Rhythm: Regular Rhythm        Mental Status:  Mental Status Findings:  Cooperative, Alert and Oriented         Anesthesia Plan  Type of Anesthesia, risks & benefits discussed:  Anesthesia Type:  general  Patient's Preference:   Intra-op Monitoring Plan: standard ASA monitors  Intra-op Monitoring Plan Comments:   Post Op Pain Control Plan: IV/PO Opioids PRN  Post Op Pain Control Plan Comments:   Induction:   IV  Beta Blocker:  Patient is on a Beta-Blocker and has received one dose within the past 24 hours (No further documentation required).       Informed Consent: Patient understands risks and agrees with Anesthesia plan.  Questions answered. Anesthesia consent signed with patient.  ASA Score: 2     Day of Surgery Review of History & Physical: I have interviewed and examined the patient. I have reviewed the patient's H&P dated:            Ready For Surgery From Anesthesia Perspective.

## 2020-09-24 NOTE — [employer] PATIENT INSTRUCTIONS
Discharge Summary/Instructions after an Endoscopic Procedure  Patient Name: Patrica Donato  Patient MRN: [id number]  Patient YOB: 1960  Thursday, September 24, 2020  Ovidio Carrillo MD  RESTRICTIONS:  During your procedure today, you received medications for sedation.  These   medications may affect your judgment, balance and coordination.  Therefore,   for 24 hours, you have the following restrictions:   - DO NOT drive a car, operate machinery, make legal/financial decisions,   sign important papers or drink alcohol.    ACTIVITY:  Today: no heavy lifting, straining or running due to procedural   sedation/anesthesia.  The following day: return to full activity including work.  DIET:  Eat and drink normally unless instructed otherwise.     TREATMENT FOR COMMON SIDE EFFECTS:  - Mild abdominal pain, nausea, belching, bloating or excessive gas:  rest,   eat lightly and use a heating pad.  - Sore Throat: treat with throat lozenges and/or gargle with warm salt   water.  - Because air was used during the procedure, expelling large amounts of air   from your rectum or belching is normal.  - If a bowel prep was taken, you may not have a bowel movement for 1-3 days.    This is normal.  SYMPTOMS TO WATCH FOR AND REPORT TO YOUR PHYSICIAN:  1. Abdominal pain or bloating, other than gas cramps.  2. Chest pain.  3. Back pain.  4. Signs of infection such as: chills or fever occurring within 24 hours   after the procedure.  5. Rectal bleeding, which would show as bright red, maroon, or black stools.   (A tablespoon of blood from the rectum is not serious, especially if   hemorrhoids are present.)  6. Vomiting.  7. Weakness or dizziness.  GO DIRECTLY TO THE NEAREST EMERGENCY ROOM IF YOU HAVE ANY OF THE FOLLOWING:      Difficulty breathing              Chills and/or fever over 101 F   Persistent vomiting and/or vomiting blood   Severe abdominal pain   Severe chest pain   Black, tarry stools   Bleeding- more than one  tablespoon   Any other symptom or condition that you feel may need urgent attention  Your doctor recommends these additional instructions:  If any biopsies were taken, your doctors clinic will contact you in 1 to 2   weeks with any results.  - Discharge patient to home.   - Resume previous diet.   - Continue present medications.   - Await pathology results.   - Treat Helicobacter if present  - Review Colonoscopy Report  - Return to my office in 2 weeks.   - Further recommendations will depend on clinical course  - Patient has a contact number available for emergencies.  The signs and   symptoms of potential delayed complications were discussed with the   patient.  Return to normal activities tomorrow.  Written discharge   instructions were provided to the patient.  For questions, problems or results please call your physician - Ovidio Carrillo MD at Work:  [phone].  [facility] EMERGENCY ROOM PHONE NUMBER: [phone]  IF A COMPLICATION OR EMERGENCY SITUATION ARISES AND YOU ARE UNABLE TO REACH   YOUR PHYSICIAN - GO DIRECTLY TO THE EMERGENCY ROOM.  MD Ovidio Lantigua MD  9/24/2020 8:26:32 AM  This report has been verified and signed electronically.  [employer]

## 2020-09-24 NOTE — TRANSFER OF CARE
"Anesthesia Transfer of Care Note    Patient: Patrica Donato    Procedure(s) Performed: Procedure(s) (LRB):  COLONOSCOPY - screening, high risk screening, or diagnostic.  (See H&P) (N/A)  ESOPHAGOGASTRODUODENOSCOPY (EGD) (N/A)    Patient location: PACU    Anesthesia Type: general    Transport from OR: Transported from OR on room air with adequate spontaneous ventilation    Post pain: adequate analgesia    Post assessment: no apparent anesthetic complications and tolerated procedure well    Post vital signs: stable    Level of consciousness: awake, alert and oriented    Nausea/Vomiting: no nausea/vomiting    Complications: none    Transfer of care protocol was followed      Last vitals:   Visit Vitals  BP (!) 158/79 (BP Location: Right arm, Patient Position: Lying)   Pulse 86   Temp 36.8 °C (98.2 °F) (Oral)   Resp 17   Ht 5' 6" (1.676 m)   Wt 85.3 kg (188 lb)   LMP  (LMP Unknown)   SpO2 98%   Breastfeeding No   BMI 30.34 kg/m²     "

## 2020-09-24 NOTE — PROVATION PATIENT INSTRUCTIONS
Discharge Summary/Instructions after an Endoscopic Procedure  Patient Name: Patrica Donato  Patient MRN: [id number]  Patient YOB: 1960  Thursday, September 24, 2020  Ovidio Carrillo MD  RESTRICTIONS:  During your procedure today, you received medications for sedation.  These   medications may affect your judgment, balance and coordination.  Therefore,   for 24 hours, you have the following restrictions:   - DO NOT drive a car, operate machinery, make legal/financial decisions,   sign important papers or drink alcohol.    ACTIVITY:  Today: no heavy lifting, straining or running due to procedural   sedation/anesthesia.  The following day: return to full activity including work.  DIET:  Eat and drink normally unless instructed otherwise.     TREATMENT FOR COMMON SIDE EFFECTS:  - Mild abdominal pain, nausea, belching, bloating or excessive gas:  rest,   eat lightly and use a heating pad.  - Sore Throat: treat with throat lozenges and/or gargle with warm salt   water.  - Because air was used during the procedure, expelling large amounts of air   from your rectum or belching is normal.  - If a bowel prep was taken, you may not have a bowel movement for 1-3 days.    This is normal.  SYMPTOMS TO WATCH FOR AND REPORT TO YOUR PHYSICIAN:  1. Abdominal pain or bloating, other than gas cramps.  2. Chest pain.  3. Back pain.  4. Signs of infection such as: chills or fever occurring within 24 hours   after the procedure.  5. Rectal bleeding, which would show as bright red, maroon, or black stools.   (A tablespoon of blood from the rectum is not serious, especially if   hemorrhoids are present.)  6. Vomiting.  7. Weakness or dizziness.  GO DIRECTLY TO THE NEAREST EMERGENCY ROOM IF YOU HAVE ANY OF THE FOLLOWING:      Difficulty breathing              Chills and/or fever over 101 F   Persistent vomiting and/or vomiting blood   Severe abdominal pain   Severe chest pain   Black, tarry stools   Bleeding- more than one  tablespoon   Any other symptom or condition that you feel may need urgent attention  Your doctor recommends these additional instructions:  If any biopsies were taken, your doctors clinic will contact you in 1 to 2   weeks with any results.  - Discharge patient to home.   - Resume previous diet.   - Continue present medications.   - Await pathology results.   - See EGD Report  - Diverticulosis and high-fiber diet educational information provided to   patient prior to discharge  - Further recommendations will depend on clinical course  - Patient has a contact number available for emergencies.  The signs and   symptoms of potential delayed complications were discussed with the   patient.  Return to normal activities tomorrow.  Written discharge   instructions were provided to the patient.  For questions, problems or results please call your physician - Ovidio Carrillo MD at Work:  [phone].  [facility] EMERGENCY ROOM PHONE NUMBER: [phone]  IF A COMPLICATION OR EMERGENCY SITUATION ARISES AND YOU ARE UNABLE TO REACH   YOUR PHYSICIAN - GO DIRECTLY TO THE EMERGENCY ROOM.  MD Ovidio Lantigua MD  9/24/2020 8:34:42 AM  This report has been verified and signed electronically.  [employer]ATION

## 2020-09-30 LAB
FINAL PATHOLOGIC DIAGNOSIS: NORMAL
GROSS: NORMAL

## 2020-10-18 DIAGNOSIS — Z01.84 ANTIBODY RESPONSE EXAMINATION: ICD-10-CM

## 2020-11-17 DIAGNOSIS — Z01.84 ANTIBODY RESPONSE EXAMINATION: ICD-10-CM

## 2020-12-01 ENCOUNTER — OFFICE VISIT (OUTPATIENT)
Dept: PAIN MEDICINE | Facility: CLINIC | Age: 60
End: 2020-12-01
Payer: COMMERCIAL

## 2020-12-01 VITALS
BODY MASS INDEX: 30.22 KG/M2 | SYSTOLIC BLOOD PRESSURE: 136 MMHG | DIASTOLIC BLOOD PRESSURE: 74 MMHG | HEIGHT: 66 IN | HEART RATE: 78 BPM | WEIGHT: 188 LBS

## 2020-12-01 DIAGNOSIS — M54.16 LUMBAR RADICULOPATHY: ICD-10-CM

## 2020-12-01 DIAGNOSIS — G57.01 PIRIFORMIS SYNDROME OF RIGHT SIDE: Primary | ICD-10-CM

## 2020-12-01 PROCEDURE — 99244 PR OFFICE CONSULTATION,LEVEL IV: ICD-10-PCS | Mod: S$GLB,,, | Performed by: ANESTHESIOLOGY

## 2020-12-01 PROCEDURE — 1125F AMNT PAIN NOTED PAIN PRSNT: CPT | Mod: S$GLB,,, | Performed by: ANESTHESIOLOGY

## 2020-12-01 PROCEDURE — 99999 PR PBB SHADOW E&M-EST. PATIENT-LVL III: CPT | Mod: PBBFAC,,, | Performed by: ANESTHESIOLOGY

## 2020-12-01 PROCEDURE — 3008F BODY MASS INDEX DOCD: CPT | Mod: CPTII,S$GLB,, | Performed by: ANESTHESIOLOGY

## 2020-12-01 PROCEDURE — 99999 PR PBB SHADOW E&M-EST. PATIENT-LVL III: ICD-10-PCS | Mod: PBBFAC,,, | Performed by: ANESTHESIOLOGY

## 2020-12-01 PROCEDURE — 99244 OFF/OP CNSLTJ NEW/EST MOD 40: CPT | Mod: S$GLB,,, | Performed by: ANESTHESIOLOGY

## 2020-12-01 PROCEDURE — 1125F PR PAIN SEVERITY QUANTIFIED, PAIN PRESENT: ICD-10-PCS | Mod: S$GLB,,, | Performed by: ANESTHESIOLOGY

## 2020-12-01 PROCEDURE — 3008F PR BODY MASS INDEX (BMI) DOCUMENTED: ICD-10-PCS | Mod: CPTII,S$GLB,, | Performed by: ANESTHESIOLOGY

## 2020-12-01 RX ORDER — METHYLPREDNISOLONE 4 MG/1
TABLET ORAL
Qty: 1 PACKAGE | Refills: 0 | Status: SHIPPED | OUTPATIENT
Start: 2020-12-01 | End: 2020-12-22

## 2020-12-01 RX ORDER — CELECOXIB 100 MG/1
CAPSULE ORAL
COMMUNITY
Start: 2020-11-06 | End: 2021-02-05 | Stop reason: ALTCHOICE

## 2020-12-01 RX ORDER — CELECOXIB 200 MG/1
200 CAPSULE ORAL 2 TIMES DAILY
Qty: 60 CAPSULE | Refills: 0 | Status: SHIPPED | OUTPATIENT
Start: 2020-12-01 | End: 2021-02-05 | Stop reason: ALTCHOICE

## 2020-12-01 NOTE — LETTER
December 1, 2020      Lorraine Garcia, YVROSE  [address]  [address]  [facility] [place] [address]           [place] [facility]  [address] [place] [address] [address]  [place] [address]  Phone: [phone]  Fax: [phone]          Patient: Patrica Donato   MR Number: [id number]   YOB: 1960   Date of Visit: 12/1/2020       Dear Lorraine Garcia:    Thank you for referring Patrica Donato to me for evaluation. Attached you will find relevant portions of my assessment and plan of care.    If you have questions, please do not hesitate to call me. I look forward to following Patrica Donato along with you.    Sincerely,    Guy Israel MD    Enclosure  CC:  No Recipients    If you would like to receive this communication electronically, please contact [email or web address] or [phone] to request more information on "[employer] Link access.    For providers and/or their staff who would like to refer a patient to [facility], please contact us through our one-stop-shop provider referral line, [facility], at [phone].    If you feel you have received this communication in error or would no longer like to receive these types of communications, please e-mail [email or web address]

## 2020-12-01 NOTE — H&P (VIEW-ONLY)
Referring Physician: Lorraine Garcia NP    PCP: Lorraine Garcia NP    CC: right buttock pain    HPI:   Patrica Donato is a 60 y.o. female with PMH significant for HTN, hypothyroidism, and hx of right ankle surgery X 3 presents as a referral for the evaluation of right buttock pain. The patient reports that her pain began approximately October 2020 after no inciting incident or trauma. The patient denies of experiencing this pain in the past. The patient localizes her pain to right buttock. The patient reports of radiation down the posterior aspect of her RLE to her ankle. The patient describes her pain as an aching type of pain. The patient denies of numbness. The patient reports that her pain is a 5/10. Patient denies of any urinary/fecal incontinence, saddle anesthesia, or weakness.     The patient reports that she has had difficulty with sleeping secondary to her pain. She reports that she believes she has piriformis syndrome.     Aggravating factors: movement, prolonged sitting    Mitigating factors: ice, laying down     Relevant Surgeries: no    Interventional Therapies: yes; hx of shoulder injections   12/13/2019: Right knee 40 mg triamcinolone acetonide 40 mg/mL via Dr. Russ  5/3/2017: Left knee 40 mg triamcinolone acetonide 40 mg/mL via Dr. Pablo  4/5/2017: Left knee 40 mg triamcinolone acetonide 40 mg/mL via Dr. Pablo    : Reviewed and consistent with medication use as prescribed.      Non-pharmacologic Treatment:     · Physical Therapy: no  · Ice/Heat: yes; ice with temporary benefit   · TENS: yes; used at the chiropractor - temporary benefit   · Massage: no  · Chiropractic care: yes; saw 2 weeks ago  · Acupuncture: no         Pain Medications:         · Currently taking: OTC Tylenol, celebrex 100 mg PO q day (takes for knee pain)    · Has tried in the past:    · Opioids: yes; percocet  · NSAIDS: yes  · Tylenol: yes  · Muscle relaxants: no  · TCAs: no  · SNRIs: no  · Anticonvulsants:  no  · topical creams: yes; tried Voltaren gel in the past for her knee    Anticoagulation: no    ROS:  Review of Systems   Constitutional: Negative for chills and fever.   HENT: Negative for sore throat.    Eyes: Negative for visual disturbance.   Respiratory: Negative for shortness of breath.    Cardiovascular: Negative for chest pain.   Gastrointestinal: Negative for nausea and vomiting.   Genitourinary: Negative for difficulty urinating.   Musculoskeletal: Positive for back pain.   Skin: Negative for rash.   Allergic/Immunologic: Negative for immunocompromised state.   Neurological: Negative for syncope.   Hematological: Does not bruise/bleed easily.   Psychiatric/Behavioral: Negative for suicidal ideas.        Past Medical History:   Diagnosis Date    Arthritis     Hypertension     Thyroid disease      Past Surgical History:   Procedure Laterality Date    ANKLE SURGERY  2000    APPENDECTOMY  2015    CHOLECYSTECTOMY      COLONOSCOPY N/A 9/24/2020    Procedure: COLONOSCOPY - screening, high risk screening, or diagnostic.  (See H&P);  Surgeon: Ovidio Carrillo MD;  Location: [facility];  Service: General;  Laterality: N/A;    ESOPHAGOGASTRODUODENOSCOPY N/A 9/24/2020    Procedure: ESOPHAGOGASTRODUODENOSCOPY (EGD);  Surgeon: Ovidio Carrillo MD;  Location: [facility];  Service: General;  Laterality: N/A;    HAND SURGERY  2012    finger     HYSTERECTOMY      KNEE ARTHROSCOPY  2011    LAPAROSCOPIC CHOLECYSTECTOMY N/A 10/2/2018    Procedure: CHOLECYSTECTOMY-LAPAROSCOPIC;  Surgeon: Ovidio Carrillo MD;  Location: [facility] OR;  Service: General;  Laterality: N/A;    TONSILLECTOMY  1965     Family History   Problem Relation Age of Onset    Dementia Mother     Cancer Father      Social History     Socioeconomic History    Marital status:      Spouse name: Not on file    Number of children: Not on file    Years of education: Not on file    Highest education level: Not on file   Occupational History     "Not on file   Social Needs    Financial resource strain: Not on file    Food insecurity     Worry: Not on file     Inability: Not on file    Transportation needs     Medical: Not on file     Non-medical: Not on file   Tobacco Use    Smoking status: Former Smoker     Quit date: 2015     Years since quittin.9    Smokeless tobacco: Never Used   Substance and Sexual Activity    Alcohol use: Yes     Alcohol/week: 2.0 standard drinks     Types: 2 Glasses of wine per week    Drug use: Never    Sexual activity: Yes     Partners: Male     Birth control/protection: See Surgical Hx   Lifestyle    Physical activity     Days per week: Not on file     Minutes per session: Not on file    Stress: Not on file   Relationships    Social connections     Talks on phone: Not on file     Gets together: Not on file     Attends Confucianism service: Not on file     Active member of club or organization: Not on file     Attends meetings of clubs or organizations: Not on file     Relationship status: Not on file   Other Topics Concern    Not on file   Social History Narrative    Not on file         Allergies: See med card    Vitals:    20 0814   BP: 136/74   Pulse: 78   Weight: 85.3 kg (188 lb)   Height: 5' 6" (1.676 m)   PainSc:   5   PainLoc: Hip         Physical exam:  Physical Exam   Constitutional: She is oriented to person, place, and time and well-developed, well-nourished, and in no distress.   HENT:   Head: Normocephalic and atraumatic.   Eyes: Conjunctivae and EOM are normal. Right eye exhibits no discharge. Left eye exhibits no discharge.   Cardiovascular: Normal rate.   Pulmonary/Chest: Effort normal and breath sounds normal. No respiratory distress.   Abdominal: Soft.   Neurological: She is alert and oriented to person, place, and time.   Skin: Skin is warm and dry. No rash noted. She is not diaphoretic.   Psychiatric: Mood, memory, affect and judgment normal.   Nursing note and vitals reviewed.       UPPER " EXTREMITIES: Normal alignment, normal range of motion, no atrophy, no skin changes,  hair growth and nail growth normal and equal bilaterally. No swelling, no tenderness.    LOWER EXTREMITIES:  Normal alignment, normal range of motion, no atrophy, no skin changes,  hair growth and nail growth normal and equal bilaterally. No swelling, no tenderness.    LUMBAR SPINE  Lumbar spine: ROM is full with flexion extension and oblique extension with no increased pain.     ((+)) Supine straight leg raise on the right   ((--)) Facet loading   Internal and external rotation of the hip causes increased pain on the right side. TTP at the site of the left greater trochanter.   Myofascial exam: No tenderness to palpation across lumbar paraspinous muscles.    Positive seated piriformis stretch on the right side    ((--)) TTP at the SI joint  ((--)) ADILSON's test  ((--)) One leg stand    ((--)) Distraction test    CRANIAL NERVES:  II:  PERRL bilaterally,   III,IV,VI: EOMI.    V:  Facial sensation equal bilaterally  VII:  Facial motor function normal.  VIII:  Hearing equal to finger rub bilaterally  IX/X: Gag normal, palate symmetric  XI:  Shoulder shrug equal, head turn equal  XII:  Tongue midline without fasciculations      MOTOR: Tone and bulk: normal bilateral upper and lower Strength: normal   Delt Bi Tri WE WF     R 5 5 5 5 5 5   L 5 5 5 5 5 5     IP ADD ABD Quad TA Gas HAM  R 5 5 5 5 5 5 5  L 5 5 5 5 5 5 5    SENSATION: Light touch and pinprick intact bilaterally  REFLEXES: normal, symmetric, nonbrisk.  Toes down, no clonus. Negative forrest's sign bilaterally.  GAIT: normal rise, base, steps, and arm swing.        Imaging: X-ray Right knee (12/13/2019):  No acute fracture or dislocation.  No significant soft tissue swelling.     There is mild tricompartmental degenerative osteoarthrosis with mild joint space narrowing and small osteophyte formation.     No significant suprapatellar effusion.    Assessment: Patrica Donato  is a 60 y.o. female with PMH significant for HTN, hypothyroidism, and hx of right ankle surgery X 3 presents as a referral for the evaluation of right buttock pain. No dedicated imaging of the spine to review. Physical examination significant for pain with seated piriformis stretch and positive right sided SLR. Treatment plan outlined below.     Plan:  - Prescribed medrol dose pack for inflammation  - Prescribed celecoxib 200 mg PO BID for pain  - Physical therapy referral for possible piriformis syndrome  - RTC in 4-6 weeks for follow-up. If the patient does not improve, would plan to either get dedicated imaging of the lumbar spine or consider right sided piriformis muscle injection under fluoroscopic guidance.     Thank you for referring this interesting patient, and I look forward to continuing to collaborate in her care.    Guy Israel MD  Pain Management

## 2020-12-01 NOTE — PROGRESS NOTES
Referring Physician: Lorraine Garcia NP    PCP: Lorraine Garcia NP    CC: right buttock pain    HPI:   Patrica Donato is a 60 y.o. female with PMH significant for HTN, hypothyroidism, and hx of right ankle surgery X 3 presents as a referral for the evaluation of right buttock pain. The patient reports that her pain began approximately October 2020 after no inciting incident or trauma. The patient denies of experiencing this pain in the past. The patient localizes her pain to right buttock. The patient reports of radiation down the posterior aspect of her RLE to her ankle. The patient describes her pain as an aching type of pain. The patient denies of numbness. The patient reports that her pain is a 5/10. Patient denies of any urinary/fecal incontinence, saddle anesthesia, or weakness.     The patient reports that she has had difficulty with sleeping secondary to her pain. She reports that she believes she has piriformis syndrome.     Aggravating factors: movement, prolonged sitting    Mitigating factors: ice, laying down     Relevant Surgeries: no    Interventional Therapies: yes; hx of shoulder injections   12/13/2019: Right knee 40 mg triamcinolone acetonide 40 mg/mL via Dr. Russ  5/3/2017: Left knee 40 mg triamcinolone acetonide 40 mg/mL via Dr. Pablo  4/5/2017: Left knee 40 mg triamcinolone acetonide 40 mg/mL via Dr. Pablo    : Reviewed and consistent with medication use as prescribed.      Non-pharmacologic Treatment:     · Physical Therapy: no  · Ice/Heat: yes; ice with temporary benefit   · TENS: yes; used at the chiropractor - temporary benefit   · Massage: no  · Chiropractic care: yes; saw 2 weeks ago  · Acupuncture: no         Pain Medications:         · Currently taking: OTC Tylenol, celebrex 100 mg PO q day (takes for knee pain)    · Has tried in the past:    · Opioids: yes; percocet  · NSAIDS: yes  · Tylenol: yes  · Muscle relaxants: no  · TCAs: no  · SNRIs: no  · Anticonvulsants:  no  · topical creams: yes; tried Voltaren gel in the past for her knee    Anticoagulation: no    ROS:  Review of Systems   Constitutional: Negative for chills and fever.   HENT: Negative for sore throat.    Eyes: Negative for visual disturbance.   Respiratory: Negative for shortness of breath.    Cardiovascular: Negative for chest pain.   Gastrointestinal: Negative for nausea and vomiting.   Genitourinary: Negative for difficulty urinating.   Musculoskeletal: Positive for back pain.   Skin: Negative for rash.   Allergic/Immunologic: Negative for immunocompromised state.   Neurological: Negative for syncope.   Hematological: Does not bruise/bleed easily.   Psychiatric/Behavioral: Negative for suicidal ideas.        Past Medical History:   Diagnosis Date    Arthritis     Hypertension     Thyroid disease      Past Surgical History:   Procedure Laterality Date    ANKLE SURGERY  2000    APPENDECTOMY  2015    CHOLECYSTECTOMY      COLONOSCOPY N/A 9/24/2020    Procedure: COLONOSCOPY - screening, high risk screening, or diagnostic.  (See H&P);  Surgeon: Ovidio Carrillo MD;  Location: [facility];  Service: General;  Laterality: N/A;    ESOPHAGOGASTRODUODENOSCOPY N/A 9/24/2020    Procedure: ESOPHAGOGASTRODUODENOSCOPY (EGD);  Surgeon: Ovidio Carrillo MD;  Location: [facility];  Service: General;  Laterality: N/A;    HAND SURGERY  2012    finger     HYSTERECTOMY      KNEE ARTHROSCOPY  2011    LAPAROSCOPIC CHOLECYSTECTOMY N/A 10/2/2018    Procedure: CHOLECYSTECTOMY-LAPAROSCOPIC;  Surgeon: Ovidio Carrillo MD;  Location: [facility];  Service: General;  Laterality: N/A;    TONSILLECTOMY  1965     Family History   Problem Relation Age of Onset    Dementia Mother     Cancer Father      Social History     Socioeconomic History    Marital status:      Spouse name: Not on file    Number of children: Not on file    Years of education: Not on file    Highest education level: Not on file   Occupational History     "Not on file   Social Needs    Financial resource strain: Not on file    Food insecurity     Worry: Not on file     Inability: Not on file    Transportation needs     Medical: Not on file     Non-medical: Not on file   Tobacco Use    Smoking status: Former Smoker     Quit date: 2015     Years since quittin.9    Smokeless tobacco: Never Used   Substance and Sexual Activity    Alcohol use: Yes     Alcohol/week: 2.0 standard drinks     Types: 2 Glasses of wine per week    Drug use: Never    Sexual activity: Yes     Partners: Male     Birth control/protection: See Surgical Hx   Lifestyle    Physical activity     Days per week: Not on file     Minutes per session: Not on file    Stress: Not on file   Relationships    Social connections     Talks on phone: Not on file     Gets together: Not on file     Attends Muslim service: Not on file     Active member of club or organization: Not on file     Attends meetings of clubs or organizations: Not on file     Relationship status: Not on file   Other Topics Concern    Not on file   Social History Narrative    Not on file         Allergies: See med card    Vitals:    20 0814   BP: 136/74   Pulse: 78   Weight: 85.3 kg (188 lb)   Height: 5' 6" (1.676 m)   PainSc:   5   PainLoc: Hip         Physical exam:  Physical Exam   Constitutional: She is oriented to person, place, and time and well-developed, well-nourished, and in no distress.   HENT:   Head: Normocephalic and atraumatic.   Eyes: Conjunctivae and EOM are normal. Right eye exhibits no discharge. Left eye exhibits no discharge.   Cardiovascular: Normal rate.   Pulmonary/Chest: Effort normal and breath sounds normal. No respiratory distress.   Abdominal: Soft.   Neurological: She is alert and oriented to person, place, and time.   Skin: Skin is warm and dry. No rash noted. She is not diaphoretic.   Psychiatric: Mood, memory, affect and judgment normal.   Nursing note and vitals reviewed.       UPPER " EXTREMITIES: Normal alignment, normal range of motion, no atrophy, no skin changes,  hair growth and nail growth normal and equal bilaterally. No swelling, no tenderness.    LOWER EXTREMITIES:  Normal alignment, normal range of motion, no atrophy, no skin changes,  hair growth and nail growth normal and equal bilaterally. No swelling, no tenderness.    LUMBAR SPINE  Lumbar spine: ROM is full with flexion extension and oblique extension with no increased pain.     ((+)) Supine straight leg raise on the right   ((--)) Facet loading   Internal and external rotation of the hip causes increased pain on the right side. TTP at the site of the left greater trochanter.   Myofascial exam: No tenderness to palpation across lumbar paraspinous muscles.    Positive seated piriformis stretch on the right side    ((--)) TTP at the SI joint  ((--)) ADILSON's test  ((--)) One leg stand    ((--)) Distraction test    CRANIAL NERVES:  II:  PERRL bilaterally,   III,IV,VI: EOMI.    V:  Facial sensation equal bilaterally  VII:  Facial motor function normal.  VIII:  Hearing equal to finger rub bilaterally  IX/X: Gag normal, palate symmetric  XI:  Shoulder shrug equal, head turn equal  XII:  Tongue midline without fasciculations      MOTOR: Tone and bulk: normal bilateral upper and lower Strength: normal   Delt Bi Tri WE WF     R 5 5 5 5 5 5   L 5 5 5 5 5 5     IP ADD ABD Quad TA Gas HAM  R 5 5 5 5 5 5 5  L 5 5 5 5 5 5 5    SENSATION: Light touch and pinprick intact bilaterally  REFLEXES: normal, symmetric, nonbrisk.  Toes down, no clonus. Negative forrest's sign bilaterally.  GAIT: normal rise, base, steps, and arm swing.        Imaging: X-ray Right knee (12/13/2019):  No acute fracture or dislocation.  No significant soft tissue swelling.     There is mild tricompartmental degenerative osteoarthrosis with mild joint space narrowing and small osteophyte formation.     No significant suprapatellar effusion.    Assessment: Patrica Donato  is a 60 y.o. female with PMH significant for HTN, hypothyroidism, and hx of right ankle surgery X 3 presents as a referral for the evaluation of right buttock pain. No dedicated imaging of the spine to review. Physical examination significant for pain with seated piriformis stretch and positive right sided SLR. Treatment plan outlined below.     Plan:  - Prescribed medrol dose pack for inflammation  - Prescribed celecoxib 200 mg PO BID for pain  - Physical therapy referral for possible piriformis syndrome  - RTC in 4-6 weeks for follow-up. If the patient does not improve, would plan to either get dedicated imaging of the lumbar spine or consider right sided piriformis muscle injection under fluoroscopic guidance.     Thank you for referring this interesting patient, and I look forward to continuing to collaborate in her care.    Guy Israel MD  Pain Management

## 2020-12-15 ENCOUNTER — HOSPITAL ENCOUNTER (OUTPATIENT)
Dept: RADIOLOGY | Facility: HOSPITAL | Age: 60
Discharge: HOME | End: 2020-12-15
Attending: NURSE PRACTITIONER
Payer: COMMERCIAL

## 2020-12-15 DIAGNOSIS — M54.9 DORSALGIA, UNSPECIFIED: ICD-10-CM

## 2020-12-15 DIAGNOSIS — G57.01 PIRIFORMIS SYNDROME OF RIGHT SIDE: ICD-10-CM

## 2020-12-15 DIAGNOSIS — M54.16 LUMBAR RADICULOPATHY: Primary | ICD-10-CM

## 2020-12-15 DIAGNOSIS — M54.16 LUMBAR RADICULOPATHY: ICD-10-CM

## 2020-12-15 PROCEDURE — 72110 X-RAY EXAM L-2 SPINE 4/>VWS: CPT | Mod: TC,FY

## 2020-12-15 PROCEDURE — 72110 XR LUMBAR SPINE 5 VIEW WITH FLEX AND EXT: ICD-10-PCS | Mod: 26,,, | Performed by: RADIOLOGY

## 2020-12-15 PROCEDURE — 72110 X-RAY EXAM L-2 SPINE 4/>VWS: CPT | Mod: 26,,, | Performed by: RADIOLOGY

## 2020-12-15 RX ORDER — HYDROCODONE BITARTRATE AND ACETAMINOPHEN 5; 325 MG/1; MG/1
1 TABLET ORAL EVERY 6 HOURS PRN
Qty: 56 TABLET | Refills: 0 | Status: SHIPPED | OUTPATIENT
Start: 2020-12-15 | End: 2020-12-29

## 2020-12-21 DIAGNOSIS — Z01.818 PRE-OP TESTING: ICD-10-CM

## 2020-12-21 DIAGNOSIS — M51.36 DDD (DEGENERATIVE DISC DISEASE), LUMBAR: ICD-10-CM

## 2020-12-21 DIAGNOSIS — M54.16 LUMBAR RADICULOPATHY: Primary | ICD-10-CM

## 2020-12-24 ENCOUNTER — IMMUNIZATION (OUTPATIENT)
Dept: FAMILY MEDICINE | Facility: CLINIC | Age: 60
End: 2020-12-24
Payer: COMMERCIAL

## 2020-12-24 DIAGNOSIS — Z23 NEED FOR VACCINATION: ICD-10-CM

## 2020-12-24 PROCEDURE — 0011A COVID-19, MRNA, LNP-S, PF, 100 MCG/0.5 ML DOSE VACCINE: CPT | Mod: CV19,S$GLB,, | Performed by: FAMILY MEDICINE

## 2020-12-24 PROCEDURE — 0011A COVID-19, MRNA, LNP-S, PF, 100 MCG/0.5 ML DOSE VACCINE: ICD-10-PCS | Mod: CV19,S$GLB,, | Performed by: FAMILY MEDICINE

## 2020-12-24 PROCEDURE — 91301 COVID-19, MRNA, LNP-S, PF, 100 MCG/0.5 ML DOSE VACCINE: ICD-10-PCS | Mod: S$GLB,,, | Performed by: FAMILY MEDICINE

## 2020-12-24 PROCEDURE — 91301 COVID-19, MRNA, LNP-S, PF, 100 MCG/0.5 ML DOSE VACCINE: CPT | Mod: S$GLB,,, | Performed by: FAMILY MEDICINE

## 2020-12-28 ENCOUNTER — LAB VISIT (OUTPATIENT)
Dept: FAMILY MEDICINE | Facility: CLINIC | Age: 60
End: 2020-12-28
Payer: COMMERCIAL

## 2020-12-28 DIAGNOSIS — Z01.818 PRE-OP TESTING: ICD-10-CM

## 2020-12-28 PROCEDURE — U0003 INFECTIOUS AGENT DETECTION BY NUCLEIC ACID (DNA OR RNA); SEVERE ACUTE RESPIRATORY SYNDROME CORONAVIRUS 2 (SARS-COV-2) (CORONAVIRUS DISEASE [COVID-19]), AMPLIFIED PROBE TECHNIQUE, MAKING USE OF HIGH THROUGHPUT TECHNOLOGIES AS DESCRIBED BY CMS-2020-01-R: HCPCS

## 2020-12-28 RX ORDER — SODIUM CHLORIDE, SODIUM LACTATE, POTASSIUM CHLORIDE, CALCIUM CHLORIDE 600; 310; 30; 20 MG/100ML; MG/100ML; MG/100ML; MG/100ML
INJECTION, SOLUTION INTRAVENOUS ONCE AS NEEDED
Status: CANCELLED | OUTPATIENT
Start: 2020-12-28 | End: 2032-05-26

## 2020-12-29 LAB — SARS-COV-2 RNA RESP QL NAA+PROBE: NOT DETECTED

## 2020-12-30 ENCOUNTER — HOSPITAL ENCOUNTER (OUTPATIENT)
Facility: HOSPITAL | Age: 60
Discharge: HOME | End: 2020-12-30
Attending: ANESTHESIOLOGY | Admitting: ANESTHESIOLOGY
Payer: COMMERCIAL

## 2020-12-30 DIAGNOSIS — M51.36 DEGENERATIVE DISC DISEASE, LUMBAR: Primary | ICD-10-CM

## 2020-12-30 PROBLEM — M51.369 DEGENERATIVE DISC DISEASE, LUMBAR: Status: ACTIVE | Noted: 2020-12-30

## 2020-12-30 PROCEDURE — 62323 NJX INTERLAMINAR LMBR/SAC: CPT | Mod: ,,, | Performed by: ANESTHESIOLOGY

## 2020-12-30 PROCEDURE — 62323 PR INJ LUMBAR/SACRAL, W/IMAGING GUIDANCE: ICD-10-PCS | Mod: ,,, | Performed by: ANESTHESIOLOGY

## 2020-12-30 PROCEDURE — 25500020 PHARM REV CODE 255: Performed by: ANESTHESIOLOGY

## 2020-12-30 PROCEDURE — 25000003 PHARM REV CODE 250: Performed by: ANESTHESIOLOGY

## 2020-12-30 PROCEDURE — 63600175 PHARM REV CODE 636 W HCPCS: Performed by: ANESTHESIOLOGY

## 2020-12-30 PROCEDURE — 62323 NJX INTERLAMINAR LMBR/SAC: CPT | Performed by: ANESTHESIOLOGY

## 2020-12-30 RX ORDER — METHYLPREDNISOLONE ACETATE 80 MG/ML
INJECTION, SUSPENSION INTRA-ARTICULAR; INTRALESIONAL; INTRAMUSCULAR; SOFT TISSUE
Status: DISCONTINUED | OUTPATIENT
Start: 2020-12-30 | End: 2020-12-30 | Stop reason: HOSPADM

## 2020-12-30 RX ORDER — METHYLPREDNISOLONE ACETATE 80 MG/ML
INJECTION, SUSPENSION INTRA-ARTICULAR; INTRALESIONAL; INTRAMUSCULAR; SOFT TISSUE
Status: DISCONTINUED
Start: 2020-12-30 | End: 2020-12-30 | Stop reason: HOSPADM

## 2020-12-30 RX ORDER — LIDOCAINE HYDROCHLORIDE 10 MG/ML
INJECTION INFILTRATION; PERINEURAL
Status: DISCONTINUED
Start: 2020-12-30 | End: 2020-12-30 | Stop reason: HOSPADM

## 2020-12-30 RX ORDER — LIDOCAINE HYDROCHLORIDE 10 MG/ML
INJECTION INFILTRATION; PERINEURAL
Status: DISCONTINUED | OUTPATIENT
Start: 2020-12-30 | End: 2020-12-30 | Stop reason: HOSPADM

## 2020-12-30 RX ORDER — ALPRAZOLAM 0.5 MG/1
1 TABLET, ORALLY DISINTEGRATING ORAL
Status: COMPLETED | OUTPATIENT
Start: 2020-12-30 | End: 2020-12-30

## 2020-12-30 RX ADMIN — ALPRAZOLAM 1 MG: 0.5 TABLET, ORALLY DISINTEGRATING ORAL at 09:12

## 2020-12-30 NOTE — OP NOTE
PROCEDURE DATE: 12/30/2020    PROCEDURE:  Lumbar interlaminar epidural steroid injection at L5-S1 under fluoroscopic guidance (right paramedian approach).    DIAGNOSIS: LUMBAR DISC DISPLACEMENT WITHOUT MYELOPATHY  POSTOP DIAGNOSIS: SAME    PHYSICIAN: Guy Israel M.D.    MEDICATIONS INJECTED: 80 mg depo-medrol with 4 ml of preservative free NaCl    LOCAL ANESTHETIC INJECTED:    Lidocaine 1% 3 ml total    SEDATION MEDICATIONS: oral sedation    ESTIMATED BLOOD LOSS:  none    COMPLICATIONS:  none    TECHNIQUE:  Time-out taken to identify patient and procedure prior to starting the procedure.  With the patient laying in a prone position, the area was prepped and draped in the usual sterile fashion using ChloraPrep and a fenestrated drape.  After determining the target level with an AP fluoroscopic view, local anesthetic was given using a 25-gauge 1.5 inch needle by raising a wheal and then infiltrating toward the interlaminar entry space.  A 3.5 inch 20-gauge Touhy needle was introduced under AP fluoroscopic guidance to the interlaminar space of L5-S1. Once the trajectory was established, the needle was visualized in the lateral view and advanced using loss of resistance technique. Once in the desired position, 2 mL contrast was injected to confirm placement and there was no vascular uptake nor intrathecal spread.  The medication was then injected slowly. The patient tolerated the procedure well.      The patient was monitored after the procedure.   They were given post-procedure and discharge instructions to follow at home.  The patient was discharged in a stable condition.

## 2020-12-30 NOTE — DISCHARGE SUMMARY
[facility]  Discharge Note  Short Stay    Procedure(s) (LRB):  CAROL Lumbar L5, S1 (N/A)    OUTCOME: Patient tolerated treatment/procedure well without complication and is now ready for discharge.    DISPOSITION: Home or Self Care    FINAL DIAGNOSIS:  Degenerative disc disease, lumbar    FOLLOWUP: In clinic    DISCHARGE INSTRUCTIONS:    Discharge Procedure Orders   Diet general     Call MD for:  temperature >100.4     Call MD for:  persistent nausea and vomiting     Call MD for:  severe uncontrolled pain     Call MD for:  difficulty breathing, headache or visual disturbances     Call MD for:  redness, tenderness, or signs of infection (pain, swelling, redness, odor or green/yellow discharge around incision site)     Call MD for:  hives     Call MD for:  persistent dizziness or light-headedness     Call MD for:  extreme fatigue

## 2020-12-30 NOTE — PLAN OF CARE
Pt awake alert and oriented. Pt tolerated procedure well. Dr. Israel spoke with pt and she verbalizes understanding of discharge instructions. Pt family notified. Pt awake alert and oriented VSS. Pt tolerated sprite.

## 2021-01-04 VITALS
RESPIRATION RATE: 14 BRPM | DIASTOLIC BLOOD PRESSURE: 72 MMHG | HEART RATE: 65 BPM | TEMPERATURE: 97 F | BODY MASS INDEX: 28.45 KG/M2 | WEIGHT: 177 LBS | OXYGEN SATURATION: 96 % | SYSTOLIC BLOOD PRESSURE: 148 MMHG | HEIGHT: 66 IN

## 2021-01-14 ENCOUNTER — OFFICE VISIT (OUTPATIENT)
Dept: PAIN MEDICINE | Facility: CLINIC | Age: 61
End: 2021-01-14
Payer: COMMERCIAL

## 2021-01-14 VITALS
SYSTOLIC BLOOD PRESSURE: 142 MMHG | HEART RATE: 79 BPM | OXYGEN SATURATION: 98 % | RESPIRATION RATE: 17 BRPM | TEMPERATURE: 98 F | HEIGHT: 66 IN | DIASTOLIC BLOOD PRESSURE: 73 MMHG | WEIGHT: 180 LBS | BODY MASS INDEX: 28.93 KG/M2

## 2021-01-14 DIAGNOSIS — M51.36 DDD (DEGENERATIVE DISC DISEASE), LUMBAR: Primary | ICD-10-CM

## 2021-01-14 DIAGNOSIS — M54.16 LUMBAR RADICULOPATHY: ICD-10-CM

## 2021-01-14 PROCEDURE — 3078F DIAST BP <80 MM HG: CPT | Mod: S$GLB,,, | Performed by: NURSE PRACTITIONER

## 2021-01-14 PROCEDURE — 3077F PR MOST RECENT SYSTOLIC BLOOD PRESSURE >= 140 MM HG: ICD-10-PCS | Mod: S$GLB,,, | Performed by: NURSE PRACTITIONER

## 2021-01-14 PROCEDURE — 99999 PR PBB SHADOW E&M-EST. PATIENT-LVL V: ICD-10-PCS | Mod: PBBFAC,,, | Performed by: NURSE PRACTITIONER

## 2021-01-14 PROCEDURE — 1125F PR PAIN SEVERITY QUANTIFIED, PAIN PRESENT: ICD-10-PCS | Mod: S$GLB,,, | Performed by: NURSE PRACTITIONER

## 2021-01-14 PROCEDURE — 99214 PR OFFICE/OUTPT VISIT, EST, LEVL IV, 30-39 MIN: ICD-10-PCS | Mod: S$GLB,,, | Performed by: NURSE PRACTITIONER

## 2021-01-14 PROCEDURE — 99999 PR PBB SHADOW E&M-EST. PATIENT-LVL V: CPT | Mod: PBBFAC,,, | Performed by: NURSE PRACTITIONER

## 2021-01-14 PROCEDURE — 3078F PR MOST RECENT DIASTOLIC BLOOD PRESSURE < 80 MM HG: ICD-10-PCS | Mod: S$GLB,,, | Performed by: NURSE PRACTITIONER

## 2021-01-14 PROCEDURE — 99214 OFFICE O/P EST MOD 30 MIN: CPT | Mod: S$GLB,,, | Performed by: NURSE PRACTITIONER

## 2021-01-14 PROCEDURE — 3008F BODY MASS INDEX DOCD: CPT | Mod: S$GLB,,, | Performed by: NURSE PRACTITIONER

## 2021-01-14 PROCEDURE — 1125F AMNT PAIN NOTED PAIN PRSNT: CPT | Mod: S$GLB,,, | Performed by: NURSE PRACTITIONER

## 2021-01-14 PROCEDURE — 3077F SYST BP >= 140 MM HG: CPT | Mod: S$GLB,,, | Performed by: NURSE PRACTITIONER

## 2021-01-14 PROCEDURE — 3008F PR BODY MASS INDEX (BMI) DOCUMENTED: ICD-10-PCS | Mod: S$GLB,,, | Performed by: NURSE PRACTITIONER

## 2021-01-14 RX ORDER — HYDROCODONE BITARTRATE AND ACETAMINOPHEN 5; 325 MG/1; MG/1
1 TABLET ORAL EVERY 6 HOURS PRN
Qty: 90 TABLET | Refills: 0 | Status: SHIPPED | OUTPATIENT
Start: 2021-01-14 | End: 2021-03-04 | Stop reason: SDUPTHER

## 2021-01-19 ENCOUNTER — LAB VISIT (OUTPATIENT)
Dept: FAMILY MEDICINE | Facility: CLINIC | Age: 61
End: 2021-01-19
Payer: COMMERCIAL

## 2021-01-19 DIAGNOSIS — M51.36 DDD (DEGENERATIVE DISC DISEASE), LUMBAR: ICD-10-CM

## 2021-01-19 PROCEDURE — U0003 INFECTIOUS AGENT DETECTION BY NUCLEIC ACID (DNA OR RNA); SEVERE ACUTE RESPIRATORY SYNDROME CORONAVIRUS 2 (SARS-COV-2) (CORONAVIRUS DISEASE [COVID-19]), AMPLIFIED PROBE TECHNIQUE, MAKING USE OF HIGH THROUGHPUT TECHNOLOGIES AS DESCRIBED BY CMS-2020-01-R: HCPCS

## 2021-01-20 LAB — SARS-COV-2 RNA RESP QL NAA+PROBE: NOT DETECTED

## 2021-01-21 ENCOUNTER — IMMUNIZATION (OUTPATIENT)
Dept: FAMILY MEDICINE | Facility: CLINIC | Age: 61
End: 2021-01-21
Payer: COMMERCIAL

## 2021-01-21 ENCOUNTER — HOSPITAL ENCOUNTER (OUTPATIENT)
Facility: HOSPITAL | Age: 61
Discharge: HOME | End: 2021-01-21
Attending: ANESTHESIOLOGY | Admitting: ANESTHESIOLOGY
Payer: COMMERCIAL

## 2021-01-21 DIAGNOSIS — M54.16 LUMBAR RADICULOPATHY: ICD-10-CM

## 2021-01-21 DIAGNOSIS — Z23 NEED FOR VACCINATION: Primary | ICD-10-CM

## 2021-01-21 DIAGNOSIS — M51.36 DEGENERATIVE DISC DISEASE, LUMBAR: Primary | ICD-10-CM

## 2021-01-21 PROCEDURE — 25000003 PHARM REV CODE 250: Performed by: ANESTHESIOLOGY

## 2021-01-21 PROCEDURE — 0012A COVID-19, MRNA, LNP-S, PF, 100 MCG/0.5 ML DOSE VACCINE: ICD-10-PCS | Mod: ,,, | Performed by: FAMILY MEDICINE

## 2021-01-21 PROCEDURE — 91301 COVID-19, MRNA, LNP-S, PF, 100 MCG/0.5 ML DOSE VACCINE: CPT | Mod: ,,, | Performed by: FAMILY MEDICINE

## 2021-01-21 PROCEDURE — 62323 NJX INTERLAMINAR LMBR/SAC: CPT | Performed by: ANESTHESIOLOGY

## 2021-01-21 PROCEDURE — 0012A COVID-19, MRNA, LNP-S, PF, 100 MCG/0.5 ML DOSE VACCINE: CPT | Mod: ,,, | Performed by: FAMILY MEDICINE

## 2021-01-21 PROCEDURE — 25500020 PHARM REV CODE 255: Performed by: ANESTHESIOLOGY

## 2021-01-21 PROCEDURE — 62323 NJX INTERLAMINAR LMBR/SAC: CPT | Mod: ,,, | Performed by: ANESTHESIOLOGY

## 2021-01-21 PROCEDURE — 63600175 PHARM REV CODE 636 W HCPCS: Performed by: ANESTHESIOLOGY

## 2021-01-21 PROCEDURE — 62323 PR INJ LUMBAR/SACRAL, W/IMAGING GUIDANCE: ICD-10-PCS | Mod: ,,, | Performed by: ANESTHESIOLOGY

## 2021-01-21 PROCEDURE — 91301 COVID-19, MRNA, LNP-S, PF, 100 MCG/0.5 ML DOSE VACCINE: ICD-10-PCS | Mod: ,,, | Performed by: FAMILY MEDICINE

## 2021-01-21 RX ORDER — SODIUM CHLORIDE, SODIUM LACTATE, POTASSIUM CHLORIDE, CALCIUM CHLORIDE 600; 310; 30; 20 MG/100ML; MG/100ML; MG/100ML; MG/100ML
INJECTION, SOLUTION INTRAVENOUS ONCE AS NEEDED
Status: DISCONTINUED | OUTPATIENT
Start: 2021-01-21 | End: 2021-01-21 | Stop reason: HOSPADM

## 2021-01-21 RX ORDER — LIDOCAINE HYDROCHLORIDE 10 MG/ML
INJECTION INFILTRATION; PERINEURAL
Status: DISCONTINUED | OUTPATIENT
Start: 2021-01-21 | End: 2021-01-21 | Stop reason: HOSPADM

## 2021-01-21 RX ORDER — LIDOCAINE HYDROCHLORIDE 10 MG/ML
INJECTION INFILTRATION; PERINEURAL
Status: DISCONTINUED
Start: 2021-01-21 | End: 2021-01-21 | Stop reason: HOSPADM

## 2021-01-21 RX ORDER — METHYLPREDNISOLONE ACETATE 80 MG/ML
INJECTION, SUSPENSION INTRA-ARTICULAR; INTRALESIONAL; INTRAMUSCULAR; SOFT TISSUE
Status: DISCONTINUED
Start: 2021-01-21 | End: 2021-01-21 | Stop reason: HOSPADM

## 2021-01-21 RX ORDER — METHYLPREDNISOLONE ACETATE 80 MG/ML
INJECTION, SUSPENSION INTRA-ARTICULAR; INTRALESIONAL; INTRAMUSCULAR; SOFT TISSUE
Status: DISCONTINUED | OUTPATIENT
Start: 2021-01-21 | End: 2021-01-21 | Stop reason: HOSPADM

## 2021-01-22 VITALS
RESPIRATION RATE: 15 BRPM | TEMPERATURE: 98 F | SYSTOLIC BLOOD PRESSURE: 141 MMHG | WEIGHT: 180 LBS | OXYGEN SATURATION: 98 % | HEART RATE: 85 BPM | HEIGHT: 66 IN | DIASTOLIC BLOOD PRESSURE: 77 MMHG | BODY MASS INDEX: 28.93 KG/M2

## 2021-02-04 ENCOUNTER — LAB VISIT (OUTPATIENT)
Dept: EMERGENCY MEDICINE | Facility: HOSPITAL | Age: 61
End: 2021-02-04
Attending: INTERNAL MEDICINE
Payer: COMMERCIAL

## 2021-02-04 DIAGNOSIS — R09.81 HEAD CONGESTION: ICD-10-CM

## 2021-02-04 DIAGNOSIS — J04.0 LARYNGITIS: ICD-10-CM

## 2021-02-04 DIAGNOSIS — R09.81 HEAD CONGESTION: Primary | ICD-10-CM

## 2021-02-04 DIAGNOSIS — J02.9 SORE THROAT: ICD-10-CM

## 2021-02-04 LAB — SARS-COV-2 RDRP RESP QL NAA+PROBE: NEGATIVE

## 2021-02-04 PROCEDURE — U0002 COVID-19 LAB TEST NON-CDC: HCPCS

## 2021-02-05 ENCOUNTER — TELEPHONE (OUTPATIENT)
Dept: PRIMARY CARE CLINIC | Facility: OTHER | Age: 61
End: 2021-02-05

## 2021-02-05 DIAGNOSIS — D36.10 NEUROMA: Primary | ICD-10-CM

## 2021-02-05 RX ORDER — CELECOXIB 200 MG/1
200 CAPSULE ORAL 2 TIMES DAILY
Qty: 60 CAPSULE | Refills: 2 | Status: SHIPPED | OUTPATIENT
Start: 2021-02-05 | End: 2021-03-07

## 2021-02-11 DIAGNOSIS — M53.3 SACROCOCCYGEAL DISORDERS, NOT ELSEWHERE CLASSIFIED: ICD-10-CM

## 2021-02-11 DIAGNOSIS — M48.07 SPINAL STENOSIS, LUMBOSACRAL REGION: ICD-10-CM

## 2021-02-15 ENCOUNTER — HOSPITAL ENCOUNTER (OUTPATIENT)
Dept: RADIOLOGY | Facility: HOSPITAL | Age: 61
Discharge: HOME | End: 2021-02-15
Attending: NURSE PRACTITIONER
Payer: COMMERCIAL

## 2021-02-15 DIAGNOSIS — M53.3 SACROCOCCYGEAL DISORDERS, NOT ELSEWHERE CLASSIFIED: ICD-10-CM

## 2021-02-15 DIAGNOSIS — M48.07 SPINAL STENOSIS, LUMBOSACRAL REGION: ICD-10-CM

## 2021-02-15 PROCEDURE — 72195 MRI SACRUM/COCCYX (BONY) W/O CONTRAST: ICD-10-PCS | Mod: 26,,, | Performed by: RADIOLOGY

## 2021-02-15 PROCEDURE — 72195 MRI PELVIS W/O DYE: CPT | Mod: 26,,, | Performed by: RADIOLOGY

## 2021-02-15 PROCEDURE — 72195 MRI PELVIS W/O DYE: CPT | Mod: TC

## 2021-02-15 PROCEDURE — 72148 MRI LUMBAR SPINE WITHOUT CONTRAST: ICD-10-PCS | Mod: 26,,, | Performed by: RADIOLOGY

## 2021-02-15 PROCEDURE — 72148 MRI LUMBAR SPINE W/O DYE: CPT | Mod: 26,,, | Performed by: RADIOLOGY

## 2021-02-15 PROCEDURE — 72148 MRI LUMBAR SPINE W/O DYE: CPT | Mod: TC

## 2021-02-17 ENCOUNTER — TELEPHONE (OUTPATIENT)
Dept: PAIN MEDICINE | Facility: CLINIC | Age: 61
End: 2021-02-17

## 2021-02-17 DIAGNOSIS — M51.36 DDD (DEGENERATIVE DISC DISEASE), LUMBAR: ICD-10-CM

## 2021-02-17 DIAGNOSIS — M54.16 LUMBAR RADICULOPATHY: Primary | ICD-10-CM

## 2021-02-26 ENCOUNTER — TELEPHONE (OUTPATIENT)
Dept: PAIN MEDICINE | Facility: CLINIC | Age: 61
End: 2021-02-26

## 2021-02-26 DIAGNOSIS — M54.16 LUMBAR RADICULOPATHY: Primary | ICD-10-CM

## 2021-02-26 RX ORDER — GABAPENTIN 600 MG/1
300 TABLET ORAL 3 TIMES DAILY
Qty: 90 TABLET | Refills: 0 | Status: SHIPPED | OUTPATIENT
Start: 2021-02-26 | End: 2021-03-31 | Stop reason: SDUPTHER

## 2021-03-04 DIAGNOSIS — M51.36 DDD (DEGENERATIVE DISC DISEASE), LUMBAR: ICD-10-CM

## 2021-03-04 RX ORDER — HYDROCODONE BITARTRATE AND ACETAMINOPHEN 5; 325 MG/1; MG/1
1 TABLET ORAL EVERY 6 HOURS PRN
Qty: 90 TABLET | Refills: 0 | Status: SHIPPED | OUTPATIENT
Start: 2021-03-04 | End: 2021-03-31 | Stop reason: SDUPTHER

## 2021-03-08 ENCOUNTER — LAB VISIT (OUTPATIENT)
Dept: FAMILY MEDICINE | Facility: CLINIC | Age: 61
End: 2021-03-08
Payer: COMMERCIAL

## 2021-03-08 DIAGNOSIS — M51.36 DDD (DEGENERATIVE DISC DISEASE), LUMBAR: ICD-10-CM

## 2021-03-08 DIAGNOSIS — M54.16 LUMBAR RADICULOPATHY: ICD-10-CM

## 2021-03-08 PROCEDURE — U0003 INFECTIOUS AGENT DETECTION BY NUCLEIC ACID (DNA OR RNA); SEVERE ACUTE RESPIRATORY SYNDROME CORONAVIRUS 2 (SARS-COV-2) (CORONAVIRUS DISEASE [COVID-19]), AMPLIFIED PROBE TECHNIQUE, MAKING USE OF HIGH THROUGHPUT TECHNOLOGIES AS DESCRIBED BY CMS-2020-01-R: HCPCS | Performed by: NURSE PRACTITIONER

## 2021-03-08 PROCEDURE — U0005 INFEC AGEN DETEC AMPLI PROBE: HCPCS | Performed by: NURSE PRACTITIONER

## 2021-03-09 LAB — SARS-COV-2 RNA RESP QL NAA+PROBE: NOT DETECTED

## 2021-03-10 RX ORDER — SODIUM CHLORIDE, SODIUM LACTATE, POTASSIUM CHLORIDE, CALCIUM CHLORIDE 600; 310; 30; 20 MG/100ML; MG/100ML; MG/100ML; MG/100ML
INJECTION, SOLUTION INTRAVENOUS ONCE AS NEEDED
Status: CANCELLED | OUTPATIENT
Start: 2021-03-10 | End: 2032-08-06

## 2021-03-11 ENCOUNTER — HOSPITAL ENCOUNTER (OUTPATIENT)
Facility: HOSPITAL | Age: 61
Discharge: HOME | End: 2021-03-11
Attending: ANESTHESIOLOGY | Admitting: ANESTHESIOLOGY
Payer: COMMERCIAL

## 2021-03-11 VITALS
TEMPERATURE: 98 F | HEART RATE: 92 BPM | OXYGEN SATURATION: 95 % | BODY MASS INDEX: 28.12 KG/M2 | HEIGHT: 66 IN | SYSTOLIC BLOOD PRESSURE: 148 MMHG | DIASTOLIC BLOOD PRESSURE: 82 MMHG | WEIGHT: 175 LBS | RESPIRATION RATE: 19 BRPM

## 2021-03-11 DIAGNOSIS — M54.16 LUMBAR RADICULOPATHY: Primary | ICD-10-CM

## 2021-03-11 PROCEDURE — 64483 PR EPIDURAL INJ, ANES/STEROID, TRANSFORAMINAL, LUMB/SACR, SNGL LEVL: ICD-10-PCS | Mod: 50,,, | Performed by: ANESTHESIOLOGY

## 2021-03-11 PROCEDURE — 25000003 PHARM REV CODE 250: Performed by: ANESTHESIOLOGY

## 2021-03-11 PROCEDURE — 64483 NJX AA&/STRD TFRM EPI L/S 1: CPT | Mod: 50 | Performed by: ANESTHESIOLOGY

## 2021-03-11 PROCEDURE — 63600175 PHARM REV CODE 636 W HCPCS: Performed by: ANESTHESIOLOGY

## 2021-03-11 PROCEDURE — 25500020 PHARM REV CODE 255: Performed by: ANESTHESIOLOGY

## 2021-03-11 PROCEDURE — 64483 NJX AA&/STRD TFRM EPI L/S 1: CPT | Mod: 50,,, | Performed by: ANESTHESIOLOGY

## 2021-03-11 RX ORDER — BUPIVACAINE HYDROCHLORIDE 2.5 MG/ML
INJECTION, SOLUTION EPIDURAL; INFILTRATION; INTRACAUDAL
Status: DISCONTINUED | OUTPATIENT
Start: 2021-03-11 | End: 2021-03-11 | Stop reason: HOSPADM

## 2021-03-11 RX ORDER — METHYLPREDNISOLONE ACETATE 80 MG/ML
INJECTION, SUSPENSION INTRA-ARTICULAR; INTRALESIONAL; INTRAMUSCULAR; SOFT TISSUE
Status: DISCONTINUED | OUTPATIENT
Start: 2021-03-11 | End: 2021-03-11 | Stop reason: HOSPADM

## 2021-03-11 RX ORDER — LIDOCAINE HYDROCHLORIDE 10 MG/ML
INJECTION INFILTRATION; PERINEURAL
Status: DISCONTINUED | OUTPATIENT
Start: 2021-03-11 | End: 2021-03-11 | Stop reason: HOSPADM

## 2021-03-31 ENCOUNTER — TELEPHONE (OUTPATIENT)
Dept: PAIN MEDICINE | Facility: CLINIC | Age: 61
End: 2021-03-31

## 2021-03-31 ENCOUNTER — TELEPHONE (OUTPATIENT)
Dept: NEUROSURGERY | Facility: CLINIC | Age: 61
End: 2021-03-31

## 2021-03-31 DIAGNOSIS — M51.36 DDD (DEGENERATIVE DISC DISEASE), LUMBAR: ICD-10-CM

## 2021-03-31 DIAGNOSIS — M54.16 LUMBAR RADICULOPATHY: ICD-10-CM

## 2021-03-31 DIAGNOSIS — M48.07 SPINAL STENOSIS, LUMBOSACRAL REGION: Primary | ICD-10-CM

## 2021-03-31 RX ORDER — HYDROCODONE BITARTRATE AND ACETAMINOPHEN 5; 325 MG/1; MG/1
1 TABLET ORAL EVERY 6 HOURS PRN
Qty: 90 TABLET | Refills: 0 | Status: SHIPPED | OUTPATIENT
Start: 2021-03-31 | End: 2021-04-27 | Stop reason: SDUPTHER

## 2021-03-31 RX ORDER — GABAPENTIN 600 MG/1
300 TABLET ORAL 3 TIMES DAILY
Qty: 90 TABLET | Refills: 0 | Status: SHIPPED | OUTPATIENT
Start: 2021-03-31 | End: 2021-04-27 | Stop reason: SDUPTHER

## 2021-04-15 ENCOUNTER — OFFICE VISIT (OUTPATIENT)
Dept: NEUROSURGERY | Facility: CLINIC | Age: 61
End: 2021-04-15
Payer: COMMERCIAL

## 2021-04-15 VITALS
HEART RATE: 83 BPM | HEIGHT: 66 IN | DIASTOLIC BLOOD PRESSURE: 80 MMHG | WEIGHT: 175.06 LBS | SYSTOLIC BLOOD PRESSURE: 152 MMHG | BODY MASS INDEX: 28.14 KG/M2

## 2021-04-15 DIAGNOSIS — M47.818 SI JOINT ARTHRITIS: Primary | ICD-10-CM

## 2021-04-15 DIAGNOSIS — M54.16 LUMBAR RADICULOPATHY: ICD-10-CM

## 2021-04-15 DIAGNOSIS — M48.07 SPINAL STENOSIS, LUMBOSACRAL REGION: ICD-10-CM

## 2021-04-15 DIAGNOSIS — M51.36 DDD (DEGENERATIVE DISC DISEASE), LUMBAR: ICD-10-CM

## 2021-04-15 PROCEDURE — 99245 PR OFFICE CONSULTATION,LEVEL V: ICD-10-PCS | Mod: S$GLB,,, | Performed by: STUDENT IN AN ORGANIZED HEALTH CARE EDUCATION/TRAINING PROGRAM

## 2021-04-15 PROCEDURE — 1125F PR PAIN SEVERITY QUANTIFIED, PAIN PRESENT: ICD-10-PCS | Mod: S$GLB,,, | Performed by: STUDENT IN AN ORGANIZED HEALTH CARE EDUCATION/TRAINING PROGRAM

## 2021-04-15 PROCEDURE — 3008F PR BODY MASS INDEX (BMI) DOCUMENTED: ICD-10-PCS | Mod: CPTII,S$GLB,, | Performed by: STUDENT IN AN ORGANIZED HEALTH CARE EDUCATION/TRAINING PROGRAM

## 2021-04-15 PROCEDURE — 1125F AMNT PAIN NOTED PAIN PRSNT: CPT | Mod: S$GLB,,, | Performed by: STUDENT IN AN ORGANIZED HEALTH CARE EDUCATION/TRAINING PROGRAM

## 2021-04-15 PROCEDURE — 99245 OFF/OP CONSLTJ NEW/EST HI 55: CPT | Mod: S$GLB,,, | Performed by: STUDENT IN AN ORGANIZED HEALTH CARE EDUCATION/TRAINING PROGRAM

## 2021-04-15 PROCEDURE — 3008F BODY MASS INDEX DOCD: CPT | Mod: CPTII,S$GLB,, | Performed by: STUDENT IN AN ORGANIZED HEALTH CARE EDUCATION/TRAINING PROGRAM

## 2021-04-15 PROCEDURE — 99999 PR PBB SHADOW E&M-EST. PATIENT-LVL IV: CPT | Mod: PBBFAC,,, | Performed by: STUDENT IN AN ORGANIZED HEALTH CARE EDUCATION/TRAINING PROGRAM

## 2021-04-15 PROCEDURE — 99999 PR PBB SHADOW E&M-EST. PATIENT-LVL IV: ICD-10-PCS | Mod: PBBFAC,,, | Performed by: STUDENT IN AN ORGANIZED HEALTH CARE EDUCATION/TRAINING PROGRAM

## 2021-04-16 ENCOUNTER — TELEPHONE (OUTPATIENT)
Dept: PHYSICAL MEDICINE AND REHAB | Facility: CLINIC | Age: 61
End: 2021-04-16

## 2021-04-16 DIAGNOSIS — M53.3 SACROILIAC JOINT PAIN: Primary | ICD-10-CM

## 2021-04-23 ENCOUNTER — OFFICE VISIT (OUTPATIENT)
Dept: PHYSICAL MEDICINE AND REHAB | Facility: CLINIC | Age: 61
End: 2021-04-23
Payer: COMMERCIAL

## 2021-04-23 DIAGNOSIS — M54.16 LUMBAR RADICULOPATHY: ICD-10-CM

## 2021-04-23 DIAGNOSIS — M48.07 SPINAL STENOSIS, LUMBOSACRAL REGION: ICD-10-CM

## 2021-04-23 PROCEDURE — 99499 NO LOS: ICD-10-PCS | Mod: S$GLB,,, | Performed by: PHYSICAL MEDICINE & REHABILITATION

## 2021-04-23 PROCEDURE — 95886 PR EMG COMPLETE, W/ NERVE CONDUCTION STUDIES, 5+ MUSCLES: ICD-10-PCS | Mod: S$GLB,,, | Performed by: PHYSICAL MEDICINE & REHABILITATION

## 2021-04-23 PROCEDURE — 99499 UNLISTED E&M SERVICE: CPT | Mod: S$GLB,,, | Performed by: PHYSICAL MEDICINE & REHABILITATION

## 2021-04-23 PROCEDURE — 95886 MUSC TEST DONE W/N TEST COMP: CPT | Mod: S$GLB,,, | Performed by: PHYSICAL MEDICINE & REHABILITATION

## 2021-04-23 PROCEDURE — 95910 NRV CNDJ TEST 7-8 STUDIES: CPT | Mod: S$GLB,,, | Performed by: PHYSICAL MEDICINE & REHABILITATION

## 2021-04-23 PROCEDURE — 95910 PR NERVE CONDUCTION STUDY; 7-8 STUDIES: ICD-10-PCS | Mod: S$GLB,,, | Performed by: PHYSICAL MEDICINE & REHABILITATION

## 2021-04-27 ENCOUNTER — LAB VISIT (OUTPATIENT)
Dept: FAMILY MEDICINE | Facility: CLINIC | Age: 61
End: 2021-04-27
Payer: COMMERCIAL

## 2021-04-27 DIAGNOSIS — Z01.818 PRE-OP TESTING: Primary | ICD-10-CM

## 2021-04-27 DIAGNOSIS — M54.16 LUMBAR RADICULOPATHY: ICD-10-CM

## 2021-04-27 DIAGNOSIS — M53.3 SACROILIAC JOINT PAIN: ICD-10-CM

## 2021-04-27 DIAGNOSIS — M51.36 DDD (DEGENERATIVE DISC DISEASE), LUMBAR: ICD-10-CM

## 2021-04-27 PROCEDURE — U0005 INFEC AGEN DETEC AMPLI PROBE: HCPCS | Performed by: ANESTHESIOLOGY

## 2021-04-27 PROCEDURE — U0003 INFECTIOUS AGENT DETECTION BY NUCLEIC ACID (DNA OR RNA); SEVERE ACUTE RESPIRATORY SYNDROME CORONAVIRUS 2 (SARS-COV-2) (CORONAVIRUS DISEASE [COVID-19]), AMPLIFIED PROBE TECHNIQUE, MAKING USE OF HIGH THROUGHPUT TECHNOLOGIES AS DESCRIBED BY CMS-2020-01-R: HCPCS | Performed by: ANESTHESIOLOGY

## 2021-04-27 RX ORDER — GABAPENTIN 600 MG/1
300 TABLET ORAL 3 TIMES DAILY
Qty: 90 TABLET | Refills: 3 | Status: SHIPPED | OUTPATIENT
Start: 2021-04-27 | End: 2021-06-11 | Stop reason: SDUPTHER

## 2021-04-27 RX ORDER — HYDROCODONE BITARTRATE AND ACETAMINOPHEN 5; 325 MG/1; MG/1
1 TABLET ORAL EVERY 6 HOURS PRN
Qty: 90 TABLET | Refills: 0 | Status: SHIPPED | OUTPATIENT
Start: 2021-04-27 | End: 2021-05-26 | Stop reason: SDUPTHER

## 2021-04-28 LAB — SARS-COV-2 RNA RESP QL NAA+PROBE: NOT DETECTED

## 2021-04-29 ENCOUNTER — HOSPITAL ENCOUNTER (OUTPATIENT)
Facility: HOSPITAL | Age: 61
Discharge: HOME | End: 2021-04-29
Attending: ANESTHESIOLOGY | Admitting: ANESTHESIOLOGY
Payer: COMMERCIAL

## 2021-04-29 DIAGNOSIS — M53.3 SACROILIAC JOINT DYSFUNCTION: Primary | ICD-10-CM

## 2021-04-29 DIAGNOSIS — M51.36 DEGENERATIVE DISC DISEASE, LUMBAR: ICD-10-CM

## 2021-04-29 DIAGNOSIS — M54.50 ACUTE RIGHT-SIDED LOW BACK PAIN WITHOUT SCIATICA: ICD-10-CM

## 2021-04-29 PROCEDURE — 25500020 PHARM REV CODE 255: Performed by: ANESTHESIOLOGY

## 2021-04-29 PROCEDURE — 27096 INJECT SACROILIAC JOINT: CPT | Mod: RT | Performed by: ANESTHESIOLOGY

## 2021-04-29 PROCEDURE — 36000704 HC OR TIME LEV I 1ST 15 MIN: Performed by: ANESTHESIOLOGY

## 2021-04-29 PROCEDURE — 37000008 HC ANESTHESIA 1ST 15 MINUTES: Performed by: ANESTHESIOLOGY

## 2021-04-29 PROCEDURE — 36000705 HC OR TIME LEV I EA ADD 15 MIN: Performed by: ANESTHESIOLOGY

## 2021-04-29 PROCEDURE — 63600175 PHARM REV CODE 636 W HCPCS: Performed by: ANESTHESIOLOGY

## 2021-04-29 PROCEDURE — 37000009 HC ANESTHESIA EA ADD 15 MINS: Performed by: ANESTHESIOLOGY

## 2021-04-29 PROCEDURE — 25000003 PHARM REV CODE 250: Performed by: ANESTHESIOLOGY

## 2021-04-29 PROCEDURE — 27096 PR INJECTION,SACROILIAC JOINT: ICD-10-PCS | Mod: RT,,, | Performed by: ANESTHESIOLOGY

## 2021-04-29 PROCEDURE — 71000033 HC RECOVERY, INTIAL HOUR: Performed by: ANESTHESIOLOGY

## 2021-04-29 PROCEDURE — 27096 INJECT SACROILIAC JOINT: CPT | Mod: RT,,, | Performed by: ANESTHESIOLOGY

## 2021-04-29 RX ORDER — SODIUM CHLORIDE, SODIUM LACTATE, POTASSIUM CHLORIDE, CALCIUM CHLORIDE 600; 310; 30; 20 MG/100ML; MG/100ML; MG/100ML; MG/100ML
INJECTION, SOLUTION INTRAVENOUS ONCE AS NEEDED
Status: DISCONTINUED | OUTPATIENT
Start: 2021-04-29 | End: 2021-04-29 | Stop reason: HOSPADM

## 2021-04-29 RX ORDER — METHYLPREDNISOLONE ACETATE 80 MG/ML
INJECTION, SUSPENSION INTRA-ARTICULAR; INTRALESIONAL; INTRAMUSCULAR; SOFT TISSUE
Status: DISCONTINUED | OUTPATIENT
Start: 2021-04-29 | End: 2021-04-29 | Stop reason: HOSPADM

## 2021-04-29 RX ORDER — BUPIVACAINE HYDROCHLORIDE 2.5 MG/ML
INJECTION, SOLUTION EPIDURAL; INFILTRATION; INTRACAUDAL
Status: DISCONTINUED | OUTPATIENT
Start: 2021-04-29 | End: 2021-04-29 | Stop reason: HOSPADM

## 2021-04-29 RX ORDER — LIDOCAINE HYDROCHLORIDE 10 MG/ML
INJECTION INFILTRATION; PERINEURAL
Status: DISCONTINUED | OUTPATIENT
Start: 2021-04-29 | End: 2021-04-29 | Stop reason: HOSPADM

## 2021-04-30 VITALS
HEIGHT: 66 IN | TEMPERATURE: 98 F | OXYGEN SATURATION: 98 % | RESPIRATION RATE: 15 BRPM | DIASTOLIC BLOOD PRESSURE: 79 MMHG | HEART RATE: 78 BPM | BODY MASS INDEX: 28.93 KG/M2 | WEIGHT: 180 LBS | SYSTOLIC BLOOD PRESSURE: 156 MMHG

## 2021-05-26 DIAGNOSIS — M51.36 DDD (DEGENERATIVE DISC DISEASE), LUMBAR: ICD-10-CM

## 2021-05-26 RX ORDER — HYDROCODONE BITARTRATE AND ACETAMINOPHEN 5; 325 MG/1; MG/1
1 TABLET ORAL EVERY 6 HOURS PRN
Qty: 90 TABLET | Refills: 0 | Status: SHIPPED | OUTPATIENT
Start: 2021-05-26 | End: 2021-06-24

## 2021-06-01 DIAGNOSIS — M53.3 SACROILIAC JOINT PAIN: Primary | ICD-10-CM

## 2021-06-10 ENCOUNTER — HOSPITAL ENCOUNTER (OUTPATIENT)
Facility: HOSPITAL | Age: 61
Discharge: HOME | End: 2021-06-10
Attending: ANESTHESIOLOGY | Admitting: ANESTHESIOLOGY
Payer: COMMERCIAL

## 2021-06-10 VITALS
TEMPERATURE: 98 F | SYSTOLIC BLOOD PRESSURE: 157 MMHG | HEART RATE: 74 BPM | DIASTOLIC BLOOD PRESSURE: 73 MMHG | OXYGEN SATURATION: 98 %

## 2021-06-10 DIAGNOSIS — M53.3 SACROILIAC JOINT DYSFUNCTION: Primary | ICD-10-CM

## 2021-06-10 PROCEDURE — 25500020 PHARM REV CODE 255: Performed by: ANESTHESIOLOGY

## 2021-06-10 PROCEDURE — 27096 INJECT SACROILIAC JOINT: CPT | Mod: RT,,, | Performed by: ANESTHESIOLOGY

## 2021-06-10 PROCEDURE — 63600175 PHARM REV CODE 636 W HCPCS: Performed by: ANESTHESIOLOGY

## 2021-06-10 PROCEDURE — 27096 PR INJECTION,SACROILIAC JOINT: ICD-10-PCS | Mod: RT,,, | Performed by: ANESTHESIOLOGY

## 2021-06-10 PROCEDURE — 25000003 PHARM REV CODE 250: Performed by: ANESTHESIOLOGY

## 2021-06-10 PROCEDURE — 27096 INJECT SACROILIAC JOINT: CPT | Mod: RT | Performed by: ANESTHESIOLOGY

## 2021-06-10 RX ORDER — BUPIVACAINE HYDROCHLORIDE 5 MG/ML
INJECTION, SOLUTION EPIDURAL; INTRACAUDAL
Status: DISCONTINUED | OUTPATIENT
Start: 2021-06-10 | End: 2021-06-10 | Stop reason: HOSPADM

## 2021-06-10 RX ORDER — SODIUM CHLORIDE, SODIUM LACTATE, POTASSIUM CHLORIDE, CALCIUM CHLORIDE 600; 310; 30; 20 MG/100ML; MG/100ML; MG/100ML; MG/100ML
INJECTION, SOLUTION INTRAVENOUS ONCE AS NEEDED
Status: DISCONTINUED | OUTPATIENT
Start: 2021-06-10 | End: 2021-06-10 | Stop reason: HOSPADM

## 2021-06-10 RX ORDER — BUPIVACAINE HYDROCHLORIDE 5 MG/ML
INJECTION, SOLUTION EPIDURAL; INTRACAUDAL
Status: DISCONTINUED
Start: 2021-06-10 | End: 2021-06-10 | Stop reason: HOSPADM

## 2021-06-10 RX ORDER — LIDOCAINE HYDROCHLORIDE 10 MG/ML
INJECTION INFILTRATION; PERINEURAL
Status: DISCONTINUED | OUTPATIENT
Start: 2021-06-10 | End: 2021-06-10 | Stop reason: HOSPADM

## 2021-06-10 RX ORDER — METHYLPREDNISOLONE ACETATE 80 MG/ML
INJECTION, SUSPENSION INTRA-ARTICULAR; INTRALESIONAL; INTRAMUSCULAR; SOFT TISSUE
Status: DISCONTINUED | OUTPATIENT
Start: 2021-06-10 | End: 2021-06-10 | Stop reason: HOSPADM

## 2021-06-11 DIAGNOSIS — M54.16 LUMBAR RADICULOPATHY: ICD-10-CM

## 2021-06-11 RX ORDER — GABAPENTIN 600 MG/1
300 TABLET ORAL 3 TIMES DAILY
Qty: 90 TABLET | Refills: 3 | Status: SHIPPED | OUTPATIENT
Start: 2021-06-11 | End: 2021-06-11

## 2021-06-11 RX ORDER — GABAPENTIN 600 MG/1
600 TABLET ORAL 3 TIMES DAILY
Qty: 90 TABLET | Refills: 3 | Status: SHIPPED | OUTPATIENT
Start: 2021-06-11 | End: 2021-08-27 | Stop reason: SDUPTHER

## 2021-06-24 ENCOUNTER — TELEPHONE (OUTPATIENT)
Dept: PAIN MEDICINE | Facility: CLINIC | Age: 61
End: 2021-06-24

## 2021-06-24 DIAGNOSIS — M48.07 SPINAL STENOSIS, LUMBOSACRAL REGION: ICD-10-CM

## 2021-06-24 DIAGNOSIS — M51.36 DDD (DEGENERATIVE DISC DISEASE), LUMBAR: ICD-10-CM

## 2021-06-24 DIAGNOSIS — M53.3 SACROILIAC JOINT PAIN: ICD-10-CM

## 2021-06-24 DIAGNOSIS — M54.16 LUMBAR RADICULOPATHY: Primary | ICD-10-CM

## 2021-06-24 RX ORDER — HYDROCODONE BITARTRATE AND ACETAMINOPHEN 10; 325 MG/1; MG/1
1 TABLET ORAL EVERY 6 HOURS PRN
Qty: 120 TABLET | Refills: 0 | Status: SHIPPED | OUTPATIENT
Start: 2021-06-24 | End: 2021-07-23 | Stop reason: SDUPTHER

## 2021-07-23 DIAGNOSIS — M53.3 SACROILIAC JOINT PAIN: ICD-10-CM

## 2021-07-23 DIAGNOSIS — M48.07 SPINAL STENOSIS, LUMBOSACRAL REGION: ICD-10-CM

## 2021-07-23 DIAGNOSIS — M51.36 DDD (DEGENERATIVE DISC DISEASE), LUMBAR: ICD-10-CM

## 2021-07-23 DIAGNOSIS — M54.16 LUMBAR RADICULOPATHY: ICD-10-CM

## 2021-07-23 RX ORDER — HYDROCODONE BITARTRATE AND ACETAMINOPHEN 10; 325 MG/1; MG/1
1 TABLET ORAL EVERY 6 HOURS PRN
Qty: 120 TABLET | Refills: 0 | Status: SHIPPED | OUTPATIENT
Start: 2021-07-23 | End: 2021-08-27 | Stop reason: SDUPTHER

## 2021-08-04 DIAGNOSIS — M51.36 DDD (DEGENERATIVE DISC DISEASE), LUMBAR: ICD-10-CM

## 2021-08-04 DIAGNOSIS — Z01.818 PRE-OP TESTING: Primary | ICD-10-CM

## 2021-08-06 ENCOUNTER — TELEPHONE (OUTPATIENT)
Dept: PAIN MEDICINE | Facility: CLINIC | Age: 61
End: 2021-08-06

## 2021-08-06 ENCOUNTER — OFFICE VISIT (OUTPATIENT)
Dept: PAIN MEDICINE | Facility: CLINIC | Age: 61
End: 2021-08-06
Payer: COMMERCIAL

## 2021-08-06 VITALS
TEMPERATURE: 98 F | BODY MASS INDEX: 28.93 KG/M2 | RESPIRATION RATE: 18 BRPM | DIASTOLIC BLOOD PRESSURE: 82 MMHG | OXYGEN SATURATION: 99 % | SYSTOLIC BLOOD PRESSURE: 132 MMHG | WEIGHT: 180 LBS | HEIGHT: 66 IN | HEART RATE: 74 BPM

## 2021-08-06 DIAGNOSIS — M47.818 ARTHROPATHY OF RIGHT SACROILIAC JOINT: ICD-10-CM

## 2021-08-06 DIAGNOSIS — M54.16 LUMBAR RADICULOPATHY: ICD-10-CM

## 2021-08-06 DIAGNOSIS — M51.36 DDD (DEGENERATIVE DISC DISEASE), LUMBAR: ICD-10-CM

## 2021-08-06 DIAGNOSIS — M48.07 SPINAL STENOSIS, LUMBOSACRAL REGION: ICD-10-CM

## 2021-08-06 DIAGNOSIS — M46.1 SACROILIITIS: Primary | ICD-10-CM

## 2021-08-06 PROCEDURE — 99214 PR OFFICE/OUTPT VISIT, EST, LEVL IV, 30-39 MIN: ICD-10-PCS | Mod: S$GLB,,, | Performed by: NURSE PRACTITIONER

## 2021-08-06 PROCEDURE — 3008F BODY MASS INDEX DOCD: CPT | Mod: S$GLB,,, | Performed by: NURSE PRACTITIONER

## 2021-08-06 PROCEDURE — 3075F SYST BP GE 130 - 139MM HG: CPT | Mod: S$GLB,,, | Performed by: NURSE PRACTITIONER

## 2021-08-06 PROCEDURE — 3079F PR MOST RECENT DIASTOLIC BLOOD PRESSURE 80-89 MM HG: ICD-10-PCS | Mod: S$GLB,,, | Performed by: NURSE PRACTITIONER

## 2021-08-06 PROCEDURE — 1125F PR PAIN SEVERITY QUANTIFIED, PAIN PRESENT: ICD-10-PCS | Mod: S$GLB,,, | Performed by: NURSE PRACTITIONER

## 2021-08-06 PROCEDURE — 99999 PR PBB SHADOW E&M-EST. PATIENT-LVL III: ICD-10-PCS | Mod: PBBFAC,,, | Performed by: NURSE PRACTITIONER

## 2021-08-06 PROCEDURE — 99214 OFFICE O/P EST MOD 30 MIN: CPT | Mod: S$GLB,,, | Performed by: NURSE PRACTITIONER

## 2021-08-06 PROCEDURE — 1159F MED LIST DOCD IN RCRD: CPT | Mod: S$GLB,,, | Performed by: NURSE PRACTITIONER

## 2021-08-06 PROCEDURE — 1160F RVW MEDS BY RX/DR IN RCRD: CPT | Mod: S$GLB,,, | Performed by: NURSE PRACTITIONER

## 2021-08-06 PROCEDURE — 3079F DIAST BP 80-89 MM HG: CPT | Mod: S$GLB,,, | Performed by: NURSE PRACTITIONER

## 2021-08-06 PROCEDURE — 1160F PR REVIEW ALL MEDS BY PRESCRIBER/CLIN PHARMACIST DOCUMENTED: ICD-10-PCS | Mod: S$GLB,,, | Performed by: NURSE PRACTITIONER

## 2021-08-06 PROCEDURE — 3075F PR MOST RECENT SYSTOLIC BLOOD PRESS GE 130-139MM HG: ICD-10-PCS | Mod: S$GLB,,, | Performed by: NURSE PRACTITIONER

## 2021-08-06 PROCEDURE — 1159F PR MEDICATION LIST DOCUMENTED IN MEDICAL RECORD: ICD-10-PCS | Mod: S$GLB,,, | Performed by: NURSE PRACTITIONER

## 2021-08-06 PROCEDURE — 99999 PR PBB SHADOW E&M-EST. PATIENT-LVL III: CPT | Mod: PBBFAC,,, | Performed by: NURSE PRACTITIONER

## 2021-08-06 PROCEDURE — 1125F AMNT PAIN NOTED PAIN PRSNT: CPT | Mod: S$GLB,,, | Performed by: NURSE PRACTITIONER

## 2021-08-06 PROCEDURE — 3008F PR BODY MASS INDEX (BMI) DOCUMENTED: ICD-10-PCS | Mod: S$GLB,,, | Performed by: NURSE PRACTITIONER

## 2021-08-09 ENCOUNTER — LAB VISIT (OUTPATIENT)
Dept: FAMILY MEDICINE | Facility: CLINIC | Age: 61
End: 2021-08-09
Payer: COMMERCIAL

## 2021-08-09 DIAGNOSIS — Z01.818 PRE-OP TESTING: ICD-10-CM

## 2021-08-09 PROCEDURE — U0005 INFEC AGEN DETEC AMPLI PROBE: HCPCS | Performed by: ANESTHESIOLOGY

## 2021-08-09 PROCEDURE — U0003 INFECTIOUS AGENT DETECTION BY NUCLEIC ACID (DNA OR RNA); SEVERE ACUTE RESPIRATORY SYNDROME CORONAVIRUS 2 (SARS-COV-2) (CORONAVIRUS DISEASE [COVID-19]), AMPLIFIED PROBE TECHNIQUE, MAKING USE OF HIGH THROUGHPUT TECHNOLOGIES AS DESCRIBED BY CMS-2020-01-R: HCPCS | Performed by: ANESTHESIOLOGY

## 2021-08-10 ENCOUNTER — TELEPHONE (OUTPATIENT)
Dept: PAIN MEDICINE | Facility: CLINIC | Age: 61
End: 2021-08-10

## 2021-08-10 LAB
SARS-COV-2 RNA RESP QL NAA+PROBE: NOT DETECTED
SARS-COV-2- CYCLE NUMBER: -1

## 2021-08-16 ENCOUNTER — TELEPHONE (OUTPATIENT)
Dept: PAIN MEDICINE | Facility: CLINIC | Age: 61
End: 2021-08-16

## 2021-08-20 ENCOUNTER — CLINICAL SUPPORT (OUTPATIENT)
Dept: PAIN MEDICINE | Facility: CLINIC | Age: 61
End: 2021-08-20
Payer: COMMERCIAL

## 2021-08-20 ENCOUNTER — TELEPHONE (OUTPATIENT)
Dept: PAIN MEDICINE | Facility: CLINIC | Age: 61
End: 2021-08-20

## 2021-08-20 DIAGNOSIS — F11.90 CHRONIC, CONTINUOUS USE OF OPIOIDS: ICD-10-CM

## 2021-08-20 DIAGNOSIS — F11.90 CHRONIC, CONTINUOUS USE OF OPIOIDS: Primary | ICD-10-CM

## 2021-08-20 PROCEDURE — 80307 DRUG TEST PRSMV CHEM ANLYZR: CPT | Performed by: NURSE PRACTITIONER

## 2021-08-25 LAB
6MAM UR QL: NOT DETECTED
7AMINOCLONAZEPAM UR QL: NOT DETECTED
A-OH ALPRAZ UR QL: NOT DETECTED
ALPHA-OH-MIDAZOLAM: NOT DETECTED
ALPRAZ UR QL: NOT DETECTED
AMPHET UR QL SCN: NOT DETECTED
ANNOTATION COMMENT IMP: NORMAL
ANNOTATION COMMENT IMP: NORMAL
BARBITURATES UR QL: NOT DETECTED
BUPRENORPHINE UR QL: NOT DETECTED
BZE UR QL: NOT DETECTED
CARBOXYTHC UR QL: NOT DETECTED
CARISOPRODOL UR QL: NOT DETECTED
CLONAZEPAM UR QL: NOT DETECTED
CODEINE UR QL: NOT DETECTED
CREAT UR-MCNC: 28.6 MG/DL (ref 20–400)
DIAZEPAM UR QL: NOT DETECTED
ETHYL GLUCURONIDE UR QL: NOT DETECTED
FENTANYL UR QL: NOT DETECTED
GABAPENTIN: PRESENT
HYDROCODONE UR QL: PRESENT
HYDROMORPHONE UR QL: PRESENT
LORAZEPAM UR QL: NOT DETECTED
MDA UR QL: NOT DETECTED
MDEA UR QL: NOT DETECTED
MDMA UR QL: NOT DETECTED
ME-PHENIDATE UR QL: NOT DETECTED
METHADONE UR QL: NOT DETECTED
METHAMPHET UR QL: NOT DETECTED
MIDAZOLAM UR QL SCN: NOT DETECTED
MORPHINE UR QL: NOT DETECTED
NALOXONE: NOT DETECTED
NORBUPRENORPHINE UR QL CFM: NOT DETECTED
NORDIAZEPAM UR QL: NOT DETECTED
NORFENTANYL UR QL: NOT DETECTED
NORHYDROCODONE UR QL CFM: PRESENT
NORMEPERIDINE UR QL CFM: NOT DETECTED
NOROXYCODONE UR QL CFM: NOT DETECTED
NOROXYMORPHONE UR QL SCN: NOT DETECTED
OXAZEPAM UR QL: NOT DETECTED
OXYCODONE UR QL: NOT DETECTED
OXYMORPHONE UR QL: NOT DETECTED
PATHOLOGY STUDY: NORMAL
PCP UR QL: NOT DETECTED
PHENTERMINE UR QL: NOT DETECTED
PREGABALIN: NOT DETECTED
SERVICE CMNT-IMP: NORMAL
TAPENTADOL UR QL SCN: NOT DETECTED
TAPENTADOL-O-SULF: NOT DETECTED
TEMAZEPAM UR QL: NOT DETECTED
TRAMADOL UR QL: NOT DETECTED
ZOLPIDEM METABOLITE: NOT DETECTED
ZOLPIDEM UR QL: NOT DETECTED

## 2021-08-27 DIAGNOSIS — M51.36 DDD (DEGENERATIVE DISC DISEASE), LUMBAR: ICD-10-CM

## 2021-08-27 DIAGNOSIS — M53.3 SACROILIAC JOINT PAIN: ICD-10-CM

## 2021-08-27 DIAGNOSIS — M48.07 SPINAL STENOSIS, LUMBOSACRAL REGION: ICD-10-CM

## 2021-08-27 DIAGNOSIS — M54.16 LUMBAR RADICULOPATHY: ICD-10-CM

## 2021-08-27 RX ORDER — CELECOXIB 200 MG/1
200 CAPSULE ORAL 2 TIMES DAILY
Qty: 60 CAPSULE | Refills: 3 | Status: SHIPPED | OUTPATIENT
Start: 2021-08-27 | End: 2021-09-24 | Stop reason: SDUPTHER

## 2021-08-27 RX ORDER — HYDROCODONE BITARTRATE AND ACETAMINOPHEN 10; 325 MG/1; MG/1
1 TABLET ORAL EVERY 6 HOURS PRN
Qty: 120 TABLET | Refills: 0 | Status: SHIPPED | OUTPATIENT
Start: 2021-08-27 | End: 2021-09-24 | Stop reason: SDUPTHER

## 2021-08-27 RX ORDER — GABAPENTIN 600 MG/1
600 TABLET ORAL 3 TIMES DAILY
Qty: 90 TABLET | Refills: 3 | Status: SHIPPED | OUTPATIENT
Start: 2021-08-27 | End: 2021-09-24 | Stop reason: SDUPTHER

## 2021-09-13 ENCOUNTER — LAB VISIT (OUTPATIENT)
Dept: FAMILY MEDICINE | Facility: CLINIC | Age: 61
End: 2021-09-13
Payer: COMMERCIAL

## 2021-09-13 DIAGNOSIS — M51.36 DDD (DEGENERATIVE DISC DISEASE), LUMBAR: ICD-10-CM

## 2021-09-13 LAB
SARS-COV-2 RNA RESP QL NAA+PROBE: NOT DETECTED
SARS-COV-2- CYCLE NUMBER: NORMAL

## 2021-09-13 PROCEDURE — U0005 INFEC AGEN DETEC AMPLI PROBE: HCPCS | Performed by: NURSE PRACTITIONER

## 2021-09-13 PROCEDURE — U0003 INFECTIOUS AGENT DETECTION BY NUCLEIC ACID (DNA OR RNA); SEVERE ACUTE RESPIRATORY SYNDROME CORONAVIRUS 2 (SARS-COV-2) (CORONAVIRUS DISEASE [COVID-19]), AMPLIFIED PROBE TECHNIQUE, MAKING USE OF HIGH THROUGHPUT TECHNOLOGIES AS DESCRIBED BY CMS-2020-01-R: HCPCS | Performed by: NURSE PRACTITIONER

## 2021-09-15 ENCOUNTER — HOSPITAL ENCOUNTER (OUTPATIENT)
Facility: HOSPITAL | Age: 61
Discharge: HOME | End: 2021-09-15
Attending: ANESTHESIOLOGY | Admitting: ANESTHESIOLOGY
Payer: COMMERCIAL

## 2021-09-15 VITALS
RESPIRATION RATE: 20 BRPM | HEART RATE: 68 BPM | SYSTOLIC BLOOD PRESSURE: 170 MMHG | BODY MASS INDEX: 28.12 KG/M2 | DIASTOLIC BLOOD PRESSURE: 87 MMHG | OXYGEN SATURATION: 99 % | TEMPERATURE: 100 F | WEIGHT: 175 LBS | HEIGHT: 66 IN

## 2021-09-15 DIAGNOSIS — M51.36 DEGENERATIVE DISC DISEASE, LUMBAR: Primary | ICD-10-CM

## 2021-09-15 PROCEDURE — 63600175 PHARM REV CODE 636 W HCPCS: Performed by: ANESTHESIOLOGY

## 2021-09-15 PROCEDURE — 25500020 PHARM REV CODE 255: Performed by: ANESTHESIOLOGY

## 2021-09-15 PROCEDURE — 25000003 PHARM REV CODE 250: Performed by: ANESTHESIOLOGY

## 2021-09-15 PROCEDURE — 62323 NJX INTERLAMINAR LMBR/SAC: CPT | Performed by: ANESTHESIOLOGY

## 2021-09-15 PROCEDURE — 62323 NJX INTERLAMINAR LMBR/SAC: CPT | Mod: ,,, | Performed by: ANESTHESIOLOGY

## 2021-09-15 PROCEDURE — 62323 PR INJ LUMBAR/SACRAL, W/IMAGING GUIDANCE: ICD-10-PCS | Mod: ,,, | Performed by: ANESTHESIOLOGY

## 2021-09-15 RX ORDER — LIDOCAINE HYDROCHLORIDE 10 MG/ML
INJECTION INFILTRATION; PERINEURAL
Status: DISCONTINUED | OUTPATIENT
Start: 2021-09-15 | End: 2021-09-15 | Stop reason: HOSPADM

## 2021-09-15 RX ORDER — METHYLPREDNISOLONE ACETATE 80 MG/ML
INJECTION, SUSPENSION INTRA-ARTICULAR; INTRALESIONAL; INTRAMUSCULAR; SOFT TISSUE
Status: DISCONTINUED | OUTPATIENT
Start: 2021-09-15 | End: 2021-09-15 | Stop reason: HOSPADM

## 2021-09-15 RX ORDER — HYDROGEN PEROXIDE 3 %
20 SOLUTION, NON-ORAL MISCELLANEOUS
COMMUNITY
End: 2021-09-24 | Stop reason: SDUPTHER

## 2021-09-15 RX ORDER — SODIUM CHLORIDE, SODIUM LACTATE, POTASSIUM CHLORIDE, CALCIUM CHLORIDE 600; 310; 30; 20 MG/100ML; MG/100ML; MG/100ML; MG/100ML
INJECTION, SOLUTION INTRAVENOUS ONCE AS NEEDED
Status: DISCONTINUED | OUTPATIENT
Start: 2021-09-15 | End: 2021-09-15 | Stop reason: HOSPADM

## 2021-09-15 RX ORDER — METHYLPREDNISOLONE ACETATE 80 MG/ML
INJECTION, SUSPENSION INTRA-ARTICULAR; INTRALESIONAL; INTRAMUSCULAR; SOFT TISSUE
Status: DISCONTINUED
Start: 2021-09-15 | End: 2021-09-15 | Stop reason: HOSPADM

## 2021-09-15 RX ORDER — LIDOCAINE HYDROCHLORIDE 10 MG/ML
INJECTION INFILTRATION; PERINEURAL
Status: DISCONTINUED
Start: 2021-09-15 | End: 2021-09-15 | Stop reason: HOSPADM

## 2021-09-24 ENCOUNTER — IMMUNIZATION (OUTPATIENT)
Dept: FAMILY MEDICINE | Facility: CLINIC | Age: 61
End: 2021-09-24
Payer: COMMERCIAL

## 2021-09-24 DIAGNOSIS — Z23 NEED FOR VACCINATION: Primary | ICD-10-CM

## 2021-09-24 DIAGNOSIS — M48.07 SPINAL STENOSIS, LUMBOSACRAL REGION: ICD-10-CM

## 2021-09-24 DIAGNOSIS — I10 ESSENTIAL HYPERTENSION: ICD-10-CM

## 2021-09-24 DIAGNOSIS — M54.16 LUMBAR RADICULOPATHY: ICD-10-CM

## 2021-09-24 DIAGNOSIS — E78.5 HYPERLIPIDEMIA, UNSPECIFIED HYPERLIPIDEMIA TYPE: ICD-10-CM

## 2021-09-24 DIAGNOSIS — M53.3 SACROILIAC JOINT PAIN: ICD-10-CM

## 2021-09-24 DIAGNOSIS — R00.0 TACHYCARDIA: ICD-10-CM

## 2021-09-24 DIAGNOSIS — M51.36 DDD (DEGENERATIVE DISC DISEASE), LUMBAR: ICD-10-CM

## 2021-09-24 DIAGNOSIS — E03.9 HYPOTHYROIDISM, UNSPECIFIED TYPE: ICD-10-CM

## 2021-09-24 PROCEDURE — 0013A COVID-19, MRNA, LNP-S, PF, 100 MCG/0.5 ML DOSE VACCINE: CPT | Mod: PBBFAC | Performed by: FAMILY MEDICINE

## 2021-09-24 PROCEDURE — 91301 COVID-19, MRNA, LNP-S, PF, 100 MCG/0.5 ML DOSE VACCINE: CPT | Mod: PBBFAC | Performed by: FAMILY MEDICINE

## 2021-09-24 RX ORDER — GABAPENTIN 600 MG/1
600 TABLET ORAL 3 TIMES DAILY
Qty: 270 TABLET | Refills: 3 | Status: SHIPPED | OUTPATIENT
Start: 2021-09-24 | End: 2021-10-25 | Stop reason: SDUPTHER

## 2021-09-24 RX ORDER — HYDROCODONE BITARTRATE AND ACETAMINOPHEN 10; 325 MG/1; MG/1
1 TABLET ORAL EVERY 6 HOURS PRN
Qty: 120 TABLET | Refills: 0 | Status: SHIPPED | OUTPATIENT
Start: 2021-09-24 | End: 2021-10-25 | Stop reason: SDUPTHER

## 2021-09-24 RX ORDER — HYDROGEN PEROXIDE 3 %
20 SOLUTION, NON-ORAL MISCELLANEOUS
Qty: 90 CAPSULE | Refills: 3 | Status: SHIPPED | OUTPATIENT
Start: 2021-09-24

## 2021-09-24 RX ORDER — PRAVASTATIN SODIUM 20 MG/1
20 TABLET ORAL NIGHTLY
Qty: 90 TABLET | Refills: 3 | Status: SHIPPED | OUTPATIENT
Start: 2021-09-24 | End: 2022-12-19 | Stop reason: SDUPTHER

## 2021-09-24 RX ORDER — LEVOTHYROXINE SODIUM 137 UG/1
137 TABLET ORAL
Qty: 90 TABLET | Refills: 3 | Status: SHIPPED | OUTPATIENT
Start: 2021-09-24 | End: 2021-12-14 | Stop reason: SDUPTHER

## 2021-09-24 RX ORDER — AMLODIPINE BESYLATE 5 MG/1
5 TABLET ORAL DAILY
Qty: 90 TABLET | Refills: 3 | Status: SHIPPED | OUTPATIENT
Start: 2021-09-24 | End: 2022-12-19 | Stop reason: SDUPTHER

## 2021-09-24 RX ORDER — CELECOXIB 200 MG/1
200 CAPSULE ORAL 2 TIMES DAILY
Qty: 180 CAPSULE | Refills: 3 | Status: SHIPPED | OUTPATIENT
Start: 2021-09-24 | End: 2022-04-29 | Stop reason: SDUPTHER

## 2021-09-24 RX ORDER — METOPROLOL SUCCINATE 100 MG/1
100 TABLET, EXTENDED RELEASE ORAL DAILY
Qty: 90 TABLET | Refills: 3 | Status: SHIPPED | OUTPATIENT
Start: 2021-09-24 | End: 2022-12-19 | Stop reason: SDUPTHER

## 2021-10-21 ENCOUNTER — TELEPHONE (OUTPATIENT)
Dept: PAIN MEDICINE | Facility: CLINIC | Age: 61
End: 2021-10-21

## 2021-10-21 DIAGNOSIS — M51.36 DDD (DEGENERATIVE DISC DISEASE), LUMBAR: Primary | ICD-10-CM

## 2021-10-27 ENCOUNTER — TELEPHONE (OUTPATIENT)
Dept: PAIN MEDICINE | Facility: CLINIC | Age: 61
End: 2021-10-27
Payer: COMMERCIAL

## 2021-10-27 ENCOUNTER — PATIENT MESSAGE (OUTPATIENT)
Dept: PAIN MEDICINE | Facility: CLINIC | Age: 61
End: 2021-10-27
Payer: COMMERCIAL

## 2021-11-03 ENCOUNTER — TELEPHONE (OUTPATIENT)
Dept: PAIN MEDICINE | Facility: CLINIC | Age: 61
End: 2021-11-03
Payer: COMMERCIAL

## 2021-11-11 ENCOUNTER — OFFICE VISIT (OUTPATIENT)
Dept: PAIN MEDICINE | Facility: CLINIC | Age: 61
End: 2021-11-11
Payer: COMMERCIAL

## 2021-11-11 VITALS
OXYGEN SATURATION: 100 % | TEMPERATURE: 98 F | HEART RATE: 69 BPM | HEIGHT: 66 IN | WEIGHT: 175 LBS | BODY MASS INDEX: 28.12 KG/M2 | DIASTOLIC BLOOD PRESSURE: 77 MMHG | SYSTOLIC BLOOD PRESSURE: 142 MMHG

## 2021-11-11 DIAGNOSIS — F11.90 CHRONIC, CONTINUOUS USE OF OPIOIDS: ICD-10-CM

## 2021-11-11 DIAGNOSIS — M47.818 ARTHROPATHY OF RIGHT SACROILIAC JOINT: ICD-10-CM

## 2021-11-11 DIAGNOSIS — M51.36 DDD (DEGENERATIVE DISC DISEASE), LUMBAR: ICD-10-CM

## 2021-11-11 DIAGNOSIS — M53.3 SACROILIAC JOINT PAIN: Primary | ICD-10-CM

## 2021-11-11 DIAGNOSIS — M54.16 LUMBAR RADICULOPATHY: ICD-10-CM

## 2021-11-11 PROCEDURE — 3008F PR BODY MASS INDEX (BMI) DOCUMENTED: ICD-10-PCS | Mod: S$GLB,,, | Performed by: ANESTHESIOLOGY

## 2021-11-11 PROCEDURE — 99999 PR PBB SHADOW E&M-EST. PATIENT-LVL III: ICD-10-PCS | Mod: PBBFAC,,, | Performed by: ANESTHESIOLOGY

## 2021-11-11 PROCEDURE — 99999 PR PBB SHADOW E&M-EST. PATIENT-LVL III: CPT | Mod: PBBFAC,,, | Performed by: ANESTHESIOLOGY

## 2021-11-11 PROCEDURE — 99214 OFFICE O/P EST MOD 30 MIN: CPT | Mod: S$GLB,,, | Performed by: ANESTHESIOLOGY

## 2021-11-11 PROCEDURE — 3078F PR MOST RECENT DIASTOLIC BLOOD PRESSURE < 80 MM HG: ICD-10-PCS | Mod: S$GLB,,, | Performed by: ANESTHESIOLOGY

## 2021-11-11 PROCEDURE — 3077F SYST BP >= 140 MM HG: CPT | Mod: S$GLB,,, | Performed by: ANESTHESIOLOGY

## 2021-11-11 PROCEDURE — 80307 DRUG TEST PRSMV CHEM ANLYZR: CPT | Performed by: ANESTHESIOLOGY

## 2021-11-11 PROCEDURE — 1159F MED LIST DOCD IN RCRD: CPT | Mod: S$GLB,,, | Performed by: ANESTHESIOLOGY

## 2021-11-11 PROCEDURE — 3008F BODY MASS INDEX DOCD: CPT | Mod: S$GLB,,, | Performed by: ANESTHESIOLOGY

## 2021-11-11 PROCEDURE — 3078F DIAST BP <80 MM HG: CPT | Mod: S$GLB,,, | Performed by: ANESTHESIOLOGY

## 2021-11-11 PROCEDURE — 3077F PR MOST RECENT SYSTOLIC BLOOD PRESSURE >= 140 MM HG: ICD-10-PCS | Mod: S$GLB,,, | Performed by: ANESTHESIOLOGY

## 2021-11-11 PROCEDURE — 99214 PR OFFICE/OUTPT VISIT, EST, LEVL IV, 30-39 MIN: ICD-10-PCS | Mod: S$GLB,,, | Performed by: ANESTHESIOLOGY

## 2021-11-11 PROCEDURE — 1159F PR MEDICATION LIST DOCUMENTED IN MEDICAL RECORD: ICD-10-PCS | Mod: S$GLB,,, | Performed by: ANESTHESIOLOGY

## 2021-11-11 RX ORDER — HYDROCODONE BITARTRATE AND ACETAMINOPHEN 10; 325 MG/1; MG/1
1 TABLET ORAL EVERY 6 HOURS PRN
Qty: 120 TABLET | Refills: 0 | Status: SHIPPED | OUTPATIENT
Start: 2022-01-17 | End: 2022-02-03 | Stop reason: SDUPTHER

## 2021-11-11 RX ORDER — HYDROCODONE BITARTRATE AND ACETAMINOPHEN 10; 325 MG/1; MG/1
1 TABLET ORAL EVERY 6 HOURS PRN
Qty: 120 TABLET | Refills: 0 | Status: SHIPPED | OUTPATIENT
Start: 2021-11-22 | End: 2022-02-03 | Stop reason: SDUPTHER

## 2021-11-11 RX ORDER — HYDROCODONE BITARTRATE AND ACETAMINOPHEN 10; 325 MG/1; MG/1
1 TABLET ORAL EVERY 6 HOURS PRN
Qty: 120 TABLET | Refills: 0 | Status: SHIPPED | OUTPATIENT
Start: 2021-12-20 | End: 2022-02-03 | Stop reason: SDUPTHER

## 2021-11-15 LAB
6MAM UR QL: NOT DETECTED
7AMINOCLONAZEPAM UR QL: NOT DETECTED
A-OH ALPRAZ UR QL: NOT DETECTED
ALPHA-OH-MIDAZOLAM: NOT DETECTED
ALPRAZ UR QL: NOT DETECTED
AMPHET UR QL SCN: NOT DETECTED
ANNOTATION COMMENT IMP: NORMAL
ANNOTATION COMMENT IMP: NORMAL
BARBITURATES UR QL: NOT DETECTED
BUPRENORPHINE UR QL: NOT DETECTED
BZE UR QL: NOT DETECTED
CARBOXYTHC UR QL: NOT DETECTED
CARISOPRODOL UR QL: NOT DETECTED
CLONAZEPAM UR QL: NOT DETECTED
CODEINE UR QL: NOT DETECTED
CREAT UR-MCNC: 211 MG/DL (ref 20–400)
DIAZEPAM UR QL: NOT DETECTED
ETHYL GLUCURONIDE UR QL: PRESENT
FENTANYL UR QL: NOT DETECTED
GABAPENTIN: PRESENT
HYDROCODONE UR QL: PRESENT
HYDROMORPHONE UR QL: PRESENT
LORAZEPAM UR QL: NOT DETECTED
MDA UR QL: NOT DETECTED
MDEA UR QL: NOT DETECTED
MDMA UR QL: NOT DETECTED
ME-PHENIDATE UR QL: NOT DETECTED
METHADONE UR QL: NOT DETECTED
METHAMPHET UR QL: NOT DETECTED
MIDAZOLAM UR QL SCN: NOT DETECTED
MORPHINE UR QL: NOT DETECTED
NALOXONE: NOT DETECTED
NORBUPRENORPHINE UR QL CFM: NOT DETECTED
NORDIAZEPAM UR QL: NOT DETECTED
NORFENTANYL UR QL: NOT DETECTED
NORHYDROCODONE UR QL CFM: PRESENT
NORMEPERIDINE UR QL CFM: NOT DETECTED
NOROXYCODONE UR QL CFM: NOT DETECTED
NOROXYMORPHONE UR QL SCN: NOT DETECTED
OXAZEPAM UR QL: NOT DETECTED
OXYCODONE UR QL: NOT DETECTED
OXYMORPHONE UR QL: NOT DETECTED
PATHOLOGY STUDY: NORMAL
PCP UR QL: NOT DETECTED
PHENTERMINE UR QL: NOT DETECTED
PREGABALIN: NOT DETECTED
SERVICE CMNT-IMP: NORMAL
TAPENTADOL UR QL SCN: NOT DETECTED
TAPENTADOL UR QL SCN: NOT DETECTED
TEMAZEPAM UR QL: NOT DETECTED
TRAMADOL UR QL: NOT DETECTED
ZOLPIDEM METABOLITE: NOT DETECTED
ZOLPIDEM UR QL: NOT DETECTED

## 2021-12-10 ENCOUNTER — TELEPHONE (OUTPATIENT)
Dept: FAMILY MEDICINE | Facility: CLINIC | Age: 61
End: 2021-12-10
Payer: COMMERCIAL

## 2021-12-10 DIAGNOSIS — E03.9 ACQUIRED HYPOTHYROIDISM: Primary | ICD-10-CM

## 2021-12-14 ENCOUNTER — LAB VISIT (OUTPATIENT)
Dept: LAB | Facility: HOSPITAL | Age: 61
End: 2021-12-14
Attending: FAMILY MEDICINE
Payer: COMMERCIAL

## 2021-12-14 ENCOUNTER — PATIENT MESSAGE (OUTPATIENT)
Dept: FAMILY MEDICINE | Facility: CLINIC | Age: 61
End: 2021-12-14
Payer: COMMERCIAL

## 2021-12-14 DIAGNOSIS — E03.9 HYPOTHYROIDISM, UNSPECIFIED TYPE: ICD-10-CM

## 2021-12-14 DIAGNOSIS — E03.9 ACQUIRED HYPOTHYROIDISM: ICD-10-CM

## 2021-12-14 LAB
ALBUMIN SERPL BCP-MCNC: 4.2 G/DL (ref 3.5–5.2)
ALP SERPL-CCNC: 100 U/L (ref 55–135)
ALT SERPL W/O P-5'-P-CCNC: 18 U/L (ref 10–44)
ANION GAP SERPL CALC-SCNC: 10 MMOL/L (ref 8–16)
AST SERPL-CCNC: 17 U/L (ref 10–40)
BASOPHILS # BLD AUTO: 0.03 K/UL (ref 0–0.2)
BASOPHILS NFR BLD: 0.5 % (ref 0–1.9)
BILIRUB SERPL-MCNC: 0.6 MG/DL (ref 0.1–1)
BUN SERPL-MCNC: 17 MG/DL (ref 8–23)
CALCIUM SERPL-MCNC: 9.6 MG/DL (ref 8.7–10.5)
CHLORIDE SERPL-SCNC: 107 MMOL/L (ref 95–110)
CO2 SERPL-SCNC: 23 MMOL/L (ref 23–29)
CREAT SERPL-MCNC: 0.7 MG/DL (ref 0.5–1.4)
DIFFERENTIAL METHOD: NORMAL
EOSINOPHIL # BLD AUTO: 0.2 K/UL (ref 0–0.5)
EOSINOPHIL NFR BLD: 2.6 % (ref 0–8)
ERYTHROCYTE [DISTWIDTH] IN BLOOD BY AUTOMATED COUNT: 13.2 % (ref 11.5–14.5)
EST. GFR  (AFRICAN AMERICAN): >60 ML/MIN/1.73 M^2
EST. GFR  (NON AFRICAN AMERICAN): >60 ML/MIN/1.73 M^2
GLUCOSE SERPL-MCNC: 111 MG/DL (ref 70–110)
HCT VFR BLD AUTO: 39.9 % (ref 37–48.5)
HGB BLD-MCNC: 12.9 G/DL (ref 12–16)
IMM GRANULOCYTES # BLD AUTO: 0.01 K/UL (ref 0–0.04)
IMM GRANULOCYTES NFR BLD AUTO: 0.2 % (ref 0–0.5)
LYMPHOCYTES # BLD AUTO: 2.1 K/UL (ref 1–4.8)
LYMPHOCYTES NFR BLD: 36.9 % (ref 18–48)
MCH RBC QN AUTO: 29.1 PG (ref 27–31)
MCHC RBC AUTO-ENTMCNC: 32.3 G/DL (ref 32–36)
MCV RBC AUTO: 90 FL (ref 82–98)
MONOCYTES # BLD AUTO: 0.4 K/UL (ref 0.3–1)
MONOCYTES NFR BLD: 7.7 % (ref 4–15)
NEUTROPHILS # BLD AUTO: 3 K/UL (ref 1.8–7.7)
NEUTROPHILS NFR BLD: 52.1 % (ref 38–73)
NRBC BLD-RTO: 0 /100 WBC
PLATELET # BLD AUTO: 239 K/UL (ref 150–450)
PMV BLD AUTO: 9.9 FL (ref 9.2–12.9)
POTASSIUM SERPL-SCNC: 4.3 MMOL/L (ref 3.5–5.1)
PROT SERPL-MCNC: 7.3 G/DL (ref 6–8.4)
RBC # BLD AUTO: 4.43 M/UL (ref 4–5.4)
SODIUM SERPL-SCNC: 140 MMOL/L (ref 136–145)
T3 SERPL-MCNC: 116 NG/DL (ref 60–180)
T4 FREE SERPL-MCNC: 1.65 NG/DL (ref 0.71–1.51)
TSH SERPL DL<=0.005 MIU/L-ACNC: 0.01 UIU/ML (ref 0.4–4)
WBC # BLD AUTO: 5.69 K/UL (ref 3.9–12.7)

## 2021-12-14 PROCEDURE — 85025 COMPLETE CBC W/AUTO DIFF WBC: CPT | Performed by: FAMILY MEDICINE

## 2021-12-14 PROCEDURE — 84439 ASSAY OF FREE THYROXINE: CPT | Performed by: FAMILY MEDICINE

## 2021-12-14 PROCEDURE — 36415 COLL VENOUS BLD VENIPUNCTURE: CPT | Performed by: FAMILY MEDICINE

## 2021-12-14 PROCEDURE — 84443 ASSAY THYROID STIM HORMONE: CPT | Performed by: FAMILY MEDICINE

## 2021-12-14 PROCEDURE — 84480 ASSAY TRIIODOTHYRONINE (T3): CPT | Performed by: FAMILY MEDICINE

## 2021-12-14 PROCEDURE — 80053 COMPREHEN METABOLIC PANEL: CPT | Performed by: FAMILY MEDICINE

## 2021-12-14 RX ORDER — LEVOTHYROXINE SODIUM 125 UG/1
125 TABLET ORAL
Qty: 90 TABLET | Refills: 3 | Status: SHIPPED | OUTPATIENT
Start: 2021-12-14 | End: 2022-03-16 | Stop reason: SDUPTHER

## 2021-12-22 ENCOUNTER — LAB VISIT (OUTPATIENT)
Dept: EMERGENCY MEDICINE | Facility: HOSPITAL | Age: 61
End: 2021-12-22
Attending: EMERGENCY MEDICINE
Payer: COMMERCIAL

## 2021-12-22 DIAGNOSIS — J02.9 SORE THROAT: Primary | ICD-10-CM

## 2021-12-22 DIAGNOSIS — J02.9 SORE THROAT: ICD-10-CM

## 2021-12-22 LAB — SARS-COV-2 RDRP RESP QL NAA+PROBE: NEGATIVE

## 2021-12-22 PROCEDURE — U0002 COVID-19 LAB TEST NON-CDC: HCPCS | Performed by: PREVENTIVE MEDICINE

## 2022-02-03 ENCOUNTER — OFFICE VISIT (OUTPATIENT)
Dept: PAIN MEDICINE | Facility: CLINIC | Age: 62
End: 2022-02-03
Payer: COMMERCIAL

## 2022-02-03 VITALS
HEART RATE: 81 BPM | DIASTOLIC BLOOD PRESSURE: 74 MMHG | SYSTOLIC BLOOD PRESSURE: 144 MMHG | WEIGHT: 175 LBS | OXYGEN SATURATION: 100 % | HEIGHT: 66 IN | BODY MASS INDEX: 28.12 KG/M2 | TEMPERATURE: 99 F

## 2022-02-03 DIAGNOSIS — M53.3 SACROILIAC JOINT PAIN: Primary | ICD-10-CM

## 2022-02-03 DIAGNOSIS — F11.90 CHRONIC, CONTINUOUS USE OF OPIOIDS: ICD-10-CM

## 2022-02-03 DIAGNOSIS — M54.16 LUMBAR RADICULOPATHY: ICD-10-CM

## 2022-02-03 DIAGNOSIS — M47.818 ARTHROPATHY OF RIGHT SACROILIAC JOINT: ICD-10-CM

## 2022-02-03 DIAGNOSIS — M51.36 DDD (DEGENERATIVE DISC DISEASE), LUMBAR: ICD-10-CM

## 2022-02-03 PROCEDURE — 99213 OFFICE O/P EST LOW 20 MIN: CPT | Mod: S$GLB,,, | Performed by: ANESTHESIOLOGY

## 2022-02-03 PROCEDURE — 99999 PR PBB SHADOW E&M-EST. PATIENT-LVL IV: CPT | Mod: PBBFAC,,, | Performed by: ANESTHESIOLOGY

## 2022-02-03 PROCEDURE — 1159F MED LIST DOCD IN RCRD: CPT | Mod: S$GLB,,, | Performed by: ANESTHESIOLOGY

## 2022-02-03 PROCEDURE — 3078F PR MOST RECENT DIASTOLIC BLOOD PRESSURE < 80 MM HG: ICD-10-PCS | Mod: S$GLB,,, | Performed by: ANESTHESIOLOGY

## 2022-02-03 PROCEDURE — 3077F PR MOST RECENT SYSTOLIC BLOOD PRESSURE >= 140 MM HG: ICD-10-PCS | Mod: S$GLB,,, | Performed by: ANESTHESIOLOGY

## 2022-02-03 PROCEDURE — 3078F DIAST BP <80 MM HG: CPT | Mod: S$GLB,,, | Performed by: ANESTHESIOLOGY

## 2022-02-03 PROCEDURE — 3008F PR BODY MASS INDEX (BMI) DOCUMENTED: ICD-10-PCS | Mod: S$GLB,,, | Performed by: ANESTHESIOLOGY

## 2022-02-03 PROCEDURE — 99999 PR PBB SHADOW E&M-EST. PATIENT-LVL IV: ICD-10-PCS | Mod: PBBFAC,,, | Performed by: ANESTHESIOLOGY

## 2022-02-03 PROCEDURE — 3008F BODY MASS INDEX DOCD: CPT | Mod: S$GLB,,, | Performed by: ANESTHESIOLOGY

## 2022-02-03 PROCEDURE — 80307 DRUG TEST PRSMV CHEM ANLYZR: CPT | Performed by: ANESTHESIOLOGY

## 2022-02-03 PROCEDURE — 3077F SYST BP >= 140 MM HG: CPT | Mod: S$GLB,,, | Performed by: ANESTHESIOLOGY

## 2022-02-03 PROCEDURE — 99213 PR OFFICE/OUTPT VISIT, EST, LEVL III, 20-29 MIN: ICD-10-PCS | Mod: S$GLB,,, | Performed by: ANESTHESIOLOGY

## 2022-02-03 PROCEDURE — 1159F PR MEDICATION LIST DOCUMENTED IN MEDICAL RECORD: ICD-10-PCS | Mod: S$GLB,,, | Performed by: ANESTHESIOLOGY

## 2022-02-03 RX ORDER — HYDROCODONE BITARTRATE AND ACETAMINOPHEN 10; 325 MG/1; MG/1
1 TABLET ORAL EVERY 6 HOURS PRN
Qty: 120 TABLET | Refills: 0 | Status: SHIPPED | OUTPATIENT
Start: 2022-02-03 | End: 2022-04-29 | Stop reason: SDUPTHER

## 2022-02-03 RX ORDER — HYDROCODONE BITARTRATE AND ACETAMINOPHEN 10; 325 MG/1; MG/1
1 TABLET ORAL EVERY 6 HOURS PRN
Qty: 120 TABLET | Refills: 0 | Status: SHIPPED | OUTPATIENT
Start: 2022-03-03 | End: 2022-04-29 | Stop reason: SDUPTHER

## 2022-02-03 RX ORDER — HYDROCODONE BITARTRATE AND ACETAMINOPHEN 10; 325 MG/1; MG/1
1 TABLET ORAL EVERY 6 HOURS PRN
Qty: 120 TABLET | Refills: 0 | Status: SHIPPED | OUTPATIENT
Start: 2022-03-31 | End: 2022-04-29 | Stop reason: SDUPTHER

## 2022-02-03 NOTE — PROGRESS NOTES
FOLLOW UP NOTE:     CHIEF COMPLAINT: right buttock/hip area    INITIAL HISTORY OF PRESENT ILLNESS: Patrica Donato is a 60 y.o. female with PMH significant for HTN, hypothyroidism, and hx of right ankle surgery X 3 presents as a referral for the evaluation of right buttock pain. The patient reports that her pain began approximately October 2020 after no inciting incident or trauma. The patient denies of experiencing this pain in the past. The patient localizes her pain to right buttock. The patient reports of radiation down the posterior aspect of her RLE to her ankle. The patient describes her pain as an aching type of pain. The patient denies of numbness. The patient reports that her pain is a 5/10. Patient denies of any urinary/fecal incontinence, saddle anesthesia, or weakness.      The patient reports that she has had difficulty with sleeping secondary to her pain. She reports that she believes she has piriformis syndrome.      Aggravating factors: movement, prolonged sitting     Mitigating factors: ice, laying down     INTERVAL HISTORY OF PRESENT ILLNESS: Patrica Donato is a 61 y.o. female with PMH significant for HTN, hypothyroidism, and hx of right ankle surgery X 3 presents as an established patient for the continued management of right buttock/hip pain. Today, the patient continues to localize her pain to the right side of her lower back/hip/buttock area. She reports of intermittent radiation down her RLE to her knee and ankle. She reports of benefit with Norco  mg PO TID/QID PRN. She states her pain medications allow her to function. The patient reports that her pain is worsened with activities such as vacuuming. The patient denies of any significant changes in her health since her last appointment. The patient also denies of any changes in the character of her pain since her last appointment. The patient reports that her current pain is a 5-6/10. The patient reports that her pain can increase  to a 10/10 at its worst. Patient denies of any urinary/fecal incontinence, saddle anesthesia, or weakness.      Of note, the patient continues to participate in home stretches and exercises as instructed by her chiropractor.     INTERVENTIONAL PAIN HISTORY:  9/15/2021: Lumbar interlaminar epidural steroid injection at L2-L3 under fluoroscopic guidance (right paramedian approach) - no relief  6/10/2021: Right SI joint injection (Dr. Israel) - 60% benefit x 3 weeks.  4/29/2021: Right SI joint injection (Dr. Israel) - 40% benefit X 3 weeks  3/11/2021: Bilateral L5 - S1 transforaminal CARLO () - minimal benefit  1/21/2021: L5 - S1 CAROL with right paramedian approach (Dr. Israel) - minimal benefit  12/30/2020: L5 - S1 interlaminar CAROL (Dr. Israel) - 60% relief for about 5 days then slowly increasing pain.     12/13/2019: Right knee 40 mg triamcinolone acetonide 40 mg/mL via Dr. Russ  5/3/2017: Left knee 40 mg triamcinolone acetonide 40 mg/mL via Dr. Pablo  4/5/2017: Left knee 40 mg triamcinolone acetonide 40 mg/mL via Dr. Pablo     CURRENT PAIN MEDICATIONS:   Tylenol OTC as needed  Celebrex 200 mg PO BID  Norco  mg PO BID/TID  Gabapentin 600 mg PO TID        ROS:  Review of Systems   Constitutional: Negative for chills and fever.   HENT: Negative for sore throat.    Eyes: Negative for visual disturbance.   Respiratory: Negative for shortness of breath.    Cardiovascular: Negative for chest pain.   Gastrointestinal: Negative for nausea and vomiting.   Genitourinary: Negative for difficulty urinating.   Musculoskeletal: Positive for arthralgias and back pain.   Skin: Negative for rash.   Allergic/Immunologic: Negative for immunocompromised state.   Neurological: Negative for syncope.   Hematological: Does not bruise/bleed easily.   Psychiatric/Behavioral: Negative for suicidal ideas.        MEDICAL, SURGICAL, FAMILY, SOCIAL HX: reviewed    MEDICATIONS/ALLERGIES: reviewed    PHYSICAL EXAM:    VITALS: Vitals  "reviewed.   Vitals:    02/03/22 0804   BP: (!) 144/74   Pulse: 81   Temp: 98.6 °F (37 °C)   SpO2: 100%   Weight: 79.4 kg (175 lb)   Height: 5' 6" (1.676 m)   PainSc:   5   PainLoc: Hip       Physical Exam  Vitals and nursing note reviewed.   Constitutional:       Appearance: She is not diaphoretic.   HENT:      Head: Normocephalic and atraumatic.   Eyes:      General:         Right eye: No discharge.         Left eye: No discharge.      Extraocular Movements: EOM normal.      Conjunctiva/sclera: Conjunctivae normal.   Cardiovascular:      Rate and Rhythm: Normal rate.   Pulmonary:      Effort: Pulmonary effort is normal. No respiratory distress.      Breath sounds: Normal breath sounds.   Abdominal:      Palpations: Abdomen is soft.   Skin:     General: Skin is warm and dry.      Findings: No rash.   Neurological:      Mental Status: She is alert and oriented to person, place, and time.   Psychiatric:         Mood and Affect: Mood and affect normal.         Cognition and Memory: Memory normal.         Judgment: Judgment normal.          UPPER EXTREMITIES: Normal alignment, normal range of motion, no atrophy, no skin changes,  hair growth and nail growth normal and equal bilaterally. No swelling, no tenderness.    LOWER EXTREMITIES:  Normal alignment, normal range of motion, no atrophy, no skin changes,  hair growth and nail growth normal and equal bilaterally. No swelling, no tenderness.     LUMBAR SPINE  Lumbar spine: ROM is full with flexion extension and oblique extension with no increased pain.     ((+)) Supine straight leg raise on the right   ((--)) Facet loading   Internal and external rotation of the hip causes increased pain on the right side. TTP at the site of the left greater trochanter.   Myofascial exam: No tenderness to palpation across lumbar paraspinous muscles.     MOTOR: Tone and bulk: normal bilateral upper and lower Strength: normal "   Delt      Bi         Tri        WE      WF                        R          5          5          5          5          5          5            L          5          5          5          5          5          5               IP         ADD     ABD     Quad   TA        Gas      HAM  R          5          5          5          5          5          5          5  L          5          5          5          5          5          5          5     SENSATION: Light touch and pinprick intact bilaterally  REFLEXES: normal, symmetric, nonbrisk.  Toes down, no clonus. Negative forrest's sign bilaterally.  GAIT: normal rise, base, steps, and arm swing.      IMAGING: no new imaging to review    ASSESSMENT: Patrica Donato is a 61 y.o. female with PMH significant for HTN, hypothyroidism, and hx of right ankle surgery X 3 presents as an established patient for the continued management of right buttock/hip pain. Today, the patient continues to localize her pain to the right side of her lower back/hip/buttock area with intermittent radiation down her RLE to her knee and ankle. She reports of benefit with Norco  mg PO TID/QID PRN without adverse side effects. She states her pain medications allow her to function. The patient presents today for a medication refill. Treatment plan outlined below.     PLAN:  1. Can repeat right SI joint injection in the future PRN as that has provided her with the most relief when compared to her other interventions  2. I have stressed the importance of physical activity and a home exercise plan to help with chronic pain and improve health.  3. Refilled Norco  mg PO q 6 hr PRN for breakthrough pain; # 120 tablets; 2 refills.  reviewed without discrepancies.   4. UDS collected today  5. RTC in 3 months for follow-up, medication refill, and repeat UDS    Guy Israel MD  Pain Management

## 2022-02-09 LAB
6MAM UR QL: NOT DETECTED
7AMINOCLONAZEPAM UR QL: NOT DETECTED
A-OH ALPRAZ UR QL: NOT DETECTED
ALPHA-OH-MIDAZOLAM: NOT DETECTED
ALPRAZ UR QL: NOT DETECTED
AMPHET UR QL SCN: NOT DETECTED
ANNOTATION COMMENT IMP: NORMAL
ANNOTATION COMMENT IMP: NORMAL
BARBITURATES UR QL: NOT DETECTED
BUPRENORPHINE UR QL: NOT DETECTED
BZE UR QL: NOT DETECTED
CARBOXYTHC UR QL: NOT DETECTED
CARISOPRODOL UR QL: NOT DETECTED
CLONAZEPAM UR QL: NOT DETECTED
CODEINE UR QL: NOT DETECTED
CREAT UR-MCNC: 168.5 MG/DL (ref 20–400)
DIAZEPAM UR QL: NOT DETECTED
ETHYL GLUCURONIDE UR QL: PRESENT
FENTANYL UR QL: NOT DETECTED
GABAPENTIN: PRESENT
HYDROCODONE UR QL: PRESENT
HYDROMORPHONE UR QL: PRESENT
LORAZEPAM UR QL: NOT DETECTED
MDA UR QL: NOT DETECTED
MDEA UR QL: NOT DETECTED
MDMA UR QL: NOT DETECTED
ME-PHENIDATE UR QL: NOT DETECTED
METHADONE UR QL: NOT DETECTED
METHAMPHET UR QL: NOT DETECTED
MIDAZOLAM UR QL SCN: NOT DETECTED
MORPHINE UR QL: NOT DETECTED
NALOXONE: NOT DETECTED
NORBUPRENORPHINE UR QL CFM: NOT DETECTED
NORDIAZEPAM UR QL: NOT DETECTED
NORFENTANYL UR QL: NOT DETECTED
NORHYDROCODONE UR QL CFM: PRESENT
NORMEPERIDINE UR QL CFM: NOT DETECTED
NOROXYCODONE UR QL CFM: NOT DETECTED
NOROXYMORPHONE UR QL SCN: NOT DETECTED
OXAZEPAM UR QL: NOT DETECTED
OXYCODONE UR QL: NOT DETECTED
OXYMORPHONE UR QL: NOT DETECTED
PATHOLOGY STUDY: NORMAL
PCP UR QL: NOT DETECTED
PHENTERMINE UR QL: NOT DETECTED
PREGABALIN: NOT DETECTED
SERVICE CMNT-IMP: NORMAL
TAPENTADOL UR QL SCN: NOT DETECTED
TAPENTADOL UR QL SCN: NOT DETECTED
TEMAZEPAM UR QL: NOT DETECTED
TRAMADOL UR QL: NOT DETECTED
ZOLPIDEM METABOLITE: NOT DETECTED
ZOLPIDEM UR QL: NOT DETECTED

## 2022-03-16 ENCOUNTER — TELEPHONE (OUTPATIENT)
Dept: FAMILY MEDICINE | Facility: CLINIC | Age: 62
End: 2022-03-16
Payer: COMMERCIAL

## 2022-03-16 DIAGNOSIS — E03.9 HYPOTHYROIDISM, UNSPECIFIED TYPE: ICD-10-CM

## 2022-03-16 RX ORDER — LEVOTHYROXINE SODIUM 125 UG/1
125 TABLET ORAL
Qty: 90 TABLET | Refills: 3 | Status: SHIPPED | OUTPATIENT
Start: 2022-03-16 | End: 2023-05-31 | Stop reason: SDUPTHER

## 2022-04-29 ENCOUNTER — OFFICE VISIT (OUTPATIENT)
Dept: PAIN MEDICINE | Facility: CLINIC | Age: 62
End: 2022-04-29
Payer: COMMERCIAL

## 2022-04-29 VITALS — SYSTOLIC BLOOD PRESSURE: 131 MMHG | DIASTOLIC BLOOD PRESSURE: 72 MMHG | HEART RATE: 76 BPM

## 2022-04-29 DIAGNOSIS — M51.36 DDD (DEGENERATIVE DISC DISEASE), LUMBAR: Primary | ICD-10-CM

## 2022-04-29 DIAGNOSIS — M47.818 ARTHROPATHY OF RIGHT SACROILIAC JOINT: ICD-10-CM

## 2022-04-29 DIAGNOSIS — M54.16 LUMBAR RADICULOPATHY: ICD-10-CM

## 2022-04-29 DIAGNOSIS — M48.07 SPINAL STENOSIS, LUMBOSACRAL REGION: ICD-10-CM

## 2022-04-29 DIAGNOSIS — M53.3 SACROILIAC JOINT PAIN: ICD-10-CM

## 2022-04-29 DIAGNOSIS — F11.90 CHRONIC, CONTINUOUS USE OF OPIOIDS: ICD-10-CM

## 2022-04-29 DIAGNOSIS — M51.36 DDD (DEGENERATIVE DISC DISEASE), LUMBAR: ICD-10-CM

## 2022-04-29 DIAGNOSIS — G89.4 CHRONIC PAIN SYNDROME: Primary | ICD-10-CM

## 2022-04-29 PROCEDURE — 99999 PR PBB SHADOW E&M-EST. PATIENT-LVL III: ICD-10-PCS | Mod: PBBFAC,,,

## 2022-04-29 PROCEDURE — 99999 PR PBB SHADOW E&M-EST. PATIENT-LVL III: CPT | Mod: PBBFAC,,,

## 2022-04-29 PROCEDURE — 99214 OFFICE O/P EST MOD 30 MIN: CPT | Mod: S$GLB,,,

## 2022-04-29 PROCEDURE — 3075F SYST BP GE 130 - 139MM HG: CPT | Mod: S$GLB,,,

## 2022-04-29 PROCEDURE — 99214 PR OFFICE/OUTPT VISIT, EST, LEVL IV, 30-39 MIN: ICD-10-PCS | Mod: S$GLB,,,

## 2022-04-29 PROCEDURE — 3078F PR MOST RECENT DIASTOLIC BLOOD PRESSURE < 80 MM HG: ICD-10-PCS | Mod: S$GLB,,,

## 2022-04-29 PROCEDURE — 1160F PR REVIEW ALL MEDS BY PRESCRIBER/CLIN PHARMACIST DOCUMENTED: ICD-10-PCS | Mod: S$GLB,,,

## 2022-04-29 PROCEDURE — 1159F PR MEDICATION LIST DOCUMENTED IN MEDICAL RECORD: ICD-10-PCS | Mod: S$GLB,,,

## 2022-04-29 PROCEDURE — 3075F PR MOST RECENT SYSTOLIC BLOOD PRESS GE 130-139MM HG: ICD-10-PCS | Mod: S$GLB,,,

## 2022-04-29 PROCEDURE — 1159F MED LIST DOCD IN RCRD: CPT | Mod: S$GLB,,,

## 2022-04-29 PROCEDURE — 3078F DIAST BP <80 MM HG: CPT | Mod: S$GLB,,,

## 2022-04-29 PROCEDURE — 80307 DRUG TEST PRSMV CHEM ANLYZR: CPT

## 2022-04-29 PROCEDURE — 1160F RVW MEDS BY RX/DR IN RCRD: CPT | Mod: S$GLB,,,

## 2022-04-29 RX ORDER — CELECOXIB 200 MG/1
200 CAPSULE ORAL 2 TIMES DAILY
Qty: 180 CAPSULE | Refills: 3 | Status: SHIPPED | OUTPATIENT
Start: 2022-04-29 | End: 2022-07-29 | Stop reason: SDUPTHER

## 2022-04-29 RX ORDER — HYDROCODONE BITARTRATE AND ACETAMINOPHEN 10; 325 MG/1; MG/1
1 TABLET ORAL EVERY 6 HOURS PRN
Qty: 120 TABLET | Refills: 0 | Status: SHIPPED | OUTPATIENT
Start: 2022-05-03 | End: 2022-07-29 | Stop reason: SDUPTHER

## 2022-04-29 RX ORDER — PANTOPRAZOLE SODIUM 40 MG/1
40 TABLET, DELAYED RELEASE ORAL DAILY
COMMUNITY
Start: 2022-04-28 | End: 2022-11-18 | Stop reason: SDUPTHER

## 2022-04-29 RX ORDER — HYDROCODONE BITARTRATE AND ACETAMINOPHEN 10; 325 MG/1; MG/1
1 TABLET ORAL EVERY 6 HOURS PRN
Qty: 120 TABLET | Refills: 0 | Status: SHIPPED | OUTPATIENT
Start: 2022-06-28 | End: 2022-10-25 | Stop reason: SDUPTHER

## 2022-04-29 RX ORDER — HYDROCODONE BITARTRATE AND ACETAMINOPHEN 10; 325 MG/1; MG/1
1 TABLET ORAL EVERY 6 HOURS PRN
Qty: 120 TABLET | Refills: 0 | Status: SHIPPED | OUTPATIENT
Start: 2022-05-31 | End: 2022-10-25 | Stop reason: SDUPTHER

## 2022-04-29 NOTE — PROGRESS NOTES
FOLLOW UP NOTE:     CHIEF COMPLAINT: right buttock/hip area    INITIAL HISTORY OF PRESENT ILLNESS: Patrica Donato is a 60 y.o. female with PMH significant for HTN, hypothyroidism, and hx of right ankle surgery X 3 presents as a referral for the evaluation of right buttock pain. The patient reports that her pain began approximately October 2020 after no inciting incident or trauma. The patient denies of experiencing this pain in the past. The patient localizes her pain to right buttock. The patient reports of radiation down the posterior aspect of her RLE to her ankle. The patient describes her pain as an aching type of pain. The patient denies of numbness. The patient reports that her pain is a 5/10. Patient denies of any urinary/fecal incontinence, saddle anesthesia, or weakness.      The patient reports that she has had difficulty with sleeping secondary to her pain. She reports that she believes she has piriformis syndrome.      Aggravating factors: movement, prolonged sitting     Mitigating factors: ice, laying down     INTERVAL HISTORY OF PRESENT ILLNESS: Patrica Donato is a 62 y.o. female with PMH significant for HTN, hypothyroidism, and hx of right ankle surgery X 3 presents as an established patient, new to me,  for the continued management of right buttock/hip pain. Today, the patient continues to localize her pain to the right side of her lower back/hip/buttock area. She reports of intermittent radiation down her RLE to her knee and ankle. She reports of benefit with Norco  mg PO TID/QID PRN. She states her pain medications allow her to function. The patient reports that her pain is worsened with activities such as vacuuming. The patient reports her pain is worse at night and when sitting in car for long periods of time. The patient denies of any significant changes in her health since her last appointment. The patient also denies of any changes in the character of her pain since her last  appointment. The patient reports that her current pain is a 6/10. The patient reports that her pain can increase to a 10/10 at its worst. Patient denies of any urinary/fecal incontinence, saddle anesthesia, or weakness.      Of note, the patient continues to participate in home stretches and exercises as instructed by her chiropractor.     INTERVENTIONAL PAIN HISTORY:  9/15/2021: Lumbar interlaminar epidural steroid injection at L2-L3 under fluoroscopic guidance (right paramedian approach) - no relief  6/10/2021: Right SI joint injection (Dr. Israel) - 60% benefit x 3 weeks.  4/29/2021: Right SI joint injection (Dr. Israel) - 40% benefit X 3 weeks  3/11/2021: Bilateral L5 - S1 transforaminal CAROL () - minimal benefit  1/21/2021: L5 - S1 CAROL with right paramedian approach (Dr. Israel) - minimal benefit  12/30/2020: L5 - S1 interlaminar CAROL (Dr. Israel) - 60% relief for about 5 days then slowly increasing pain.     12/13/2019: Right knee 40 mg triamcinolone acetonide 40 mg/mL via Dr. Russ  5/3/2017: Left knee 40 mg triamcinolone acetonide 40 mg/mL via Dr. Pablo  4/5/2017: Left knee 40 mg triamcinolone acetonide 40 mg/mL via Dr. Pablo     CURRENT PAIN MEDICATIONS:   Tylenol OTC as needed  Celebrex 200 mg PO BID  Norco  mg PO BID/TID  Gabapentin 600 mg PO TID        ROS:  Review of Systems   Constitutional: Negative for chills and fever.   HENT: Negative for sore throat.    Eyes: Negative for visual disturbance.   Respiratory: Negative for shortness of breath.    Cardiovascular: Negative for chest pain.   Gastrointestinal: Negative for nausea and vomiting.   Genitourinary: Negative for difficulty urinating.   Musculoskeletal: Positive for arthralgias, back pain and gait problem.   Skin: Negative for rash.   Allergic/Immunologic: Negative for immunocompromised state.   Neurological: Negative for syncope.   Hematological: Does not bruise/bleed easily.   Psychiatric/Behavioral: Negative for suicidal ideas.    All other systems reviewed and are negative.       MEDICAL, SURGICAL, FAMILY, SOCIAL HX: reviewed    MEDICATIONS/ALLERGIES: reviewed    PHYSICAL EXAM:    VITALS: Vitals reviewed.   Vitals:    04/29/22 0903   BP: 131/72   Pulse: 76   PainSc:   6       Physical Exam  Vitals and nursing note reviewed.   Constitutional:       Appearance: She is not diaphoretic.   HENT:      Head: Normocephalic and atraumatic.   Eyes:      General:         Right eye: No discharge.         Left eye: No discharge.      Conjunctiva/sclera: Conjunctivae normal.   Cardiovascular:      Rate and Rhythm: Normal rate.   Pulmonary:      Effort: Pulmonary effort is normal. No respiratory distress.      Breath sounds: Normal breath sounds.   Abdominal:      Palpations: Abdomen is soft.   Skin:     General: Skin is warm and dry.      Findings: No rash.   Neurological:      Mental Status: She is alert and oriented to person, place, and time.   Psychiatric:         Mood and Affect: Mood and affect normal.         Cognition and Memory: Memory normal.         Judgment: Judgment normal.          UPPER EXTREMITIES: Normal alignment, normal range of motion, no atrophy, no skin changes,  hair growth and nail growth normal and equal bilaterally. No swelling, no tenderness.    LOWER EXTREMITIES:  Normal alignment, normal range of motion, no atrophy, no skin changes,  hair growth and nail growth normal and equal bilaterally. No swelling, no tenderness.     LUMBAR SPINE  Lumbar spine: ROM is full with flexion extension and oblique extension with no increased pain.     ((+)) Supine straight leg raise on the right   ((--)) Facet loading   Internal and external rotation of the hip causes increased pain on the right side. TTP at the site of the left greater trochanter.   Myofascial exam: No tenderness to palpation across lumbar paraspinous muscles.     MOTOR: Tone and bulk: normal bilateral upper and lower Strength: normal    Delt      Bi         Tri        WE      WF                        R          5          5          5          5          5          5            L          5          5          5          5          5          5               IP         ADD     ABD     Quad   TA        Gas      HAM  R          5          5          5          5          5          5          5  L          5          5          5          5          5          5          5     SENSATION: Light touch and pinprick intact bilaterally  REFLEXES: normal, symmetric, nonbrisk.  Toes down, no clonus. Negative forrest's sign bilaterally.  GAIT: normal rise, base, steps, and arm swing.      IMAGING: no new imaging to review    ASSESSMENT: Patrica Donato is a 61 y.o. female with PMH significant for HTN, hypothyroidism, and hx of right ankle surgery X 3 presents as an established patient, new to me, for the continued management of right buttock/hip pain. Today, the patient continues to localize her pain to the right side of her lower back/hip/buttock area with intermittent radiation down her RLE to her knee and ankle. She reports of benefit with Norco  mg PO TID/QID PRN without adverse side effects. She states her pain medications allow her to function. The patient presents today for a medication refill. Treatment plan outlined below.   Encounter Diagnoses   Name Primary?    Chronic, continuous use of opioids     Lumbar radiculopathy     DDD (degenerative disc disease), lumbar Yes    Sacroiliac joint pain     Spinal stenosis, lumbosacral region     Arthropathy of right sacroiliac joint        PLAN:  1. Can repeat right SI joint injection in the future PRN as that has provided her with the most relief when compared to her other interventions  2. I have stressed the importance of physical activity and a home exercise plan to help with chronic pain and improve health.  3. Refilled Norco  mg PO q 6 hr PRN for breakthrough pain; # 120  tablets; 2 refills.  reviewed without discrepancies.   4. UDS collected today, last took pain medication this morning.   5. Refilled Celebrex 200 mg PO BID, #60 tablets, 2 refills.    6. Discussed repeat SI joint injection vs. MBB/RFA. Pt wishes to proceed with MBB/RFA pending insurance approval.    7. Patient to call after her vacation to discuss scheduling MBB/RFA.   8. RTC in 3 months for follow-up, medication refill, and repeat UDS  All medication management performed by Dr. Israel.   Katerin Ledezma, NP

## 2022-05-03 LAB
6MAM UR QL: NOT DETECTED
7AMINOCLONAZEPAM UR QL: NOT DETECTED
A-OH ALPRAZ UR QL: NOT DETECTED
ALPHA-OH-MIDAZOLAM: NOT DETECTED
ALPRAZ UR QL: NOT DETECTED
AMPHET UR QL SCN: NOT DETECTED
ANNOTATION COMMENT IMP: NORMAL
ANNOTATION COMMENT IMP: NORMAL
BARBITURATES UR QL: NOT DETECTED
BUPRENORPHINE UR QL: NOT DETECTED
BZE UR QL: NOT DETECTED
CARBOXYTHC UR QL: NOT DETECTED
CARISOPRODOL UR QL: NOT DETECTED
CLONAZEPAM UR QL: NOT DETECTED
CODEINE UR QL: NOT DETECTED
CREAT UR-MCNC: 330.8 MG/DL (ref 20–400)
DIAZEPAM UR QL: NOT DETECTED
ETHYL GLUCURONIDE UR QL: PRESENT
FENTANYL UR QL: NOT DETECTED
GABAPENTIN: PRESENT
HYDROCODONE UR QL: PRESENT
HYDROMORPHONE UR QL: PRESENT
LORAZEPAM UR QL: NOT DETECTED
MDA UR QL: NOT DETECTED
MDEA UR QL: NOT DETECTED
MDMA UR QL: NOT DETECTED
ME-PHENIDATE UR QL: NOT DETECTED
METHADONE UR QL: NOT DETECTED
METHAMPHET UR QL: NOT DETECTED
MIDAZOLAM UR QL SCN: NOT DETECTED
MORPHINE UR QL: NOT DETECTED
NALOXONE: NOT DETECTED
NORBUPRENORPHINE UR QL CFM: NOT DETECTED
NORDIAZEPAM UR QL: NOT DETECTED
NORFENTANYL UR QL: NOT DETECTED
NORHYDROCODONE UR QL CFM: PRESENT
NORMEPERIDINE UR QL CFM: NOT DETECTED
NOROXYCODONE UR QL CFM: NOT DETECTED
NOROXYMORPHONE UR QL SCN: NOT DETECTED
OXAZEPAM UR QL: NOT DETECTED
OXYCODONE UR QL: NOT DETECTED
OXYMORPHONE UR QL: NOT DETECTED
PATHOLOGY STUDY: NORMAL
PCP UR QL: NOT DETECTED
PHENTERMINE UR QL: NOT DETECTED
PREGABALIN: NOT DETECTED
SERVICE CMNT-IMP: NORMAL
TAPENTADOL UR QL SCN: NOT DETECTED
TAPENTADOL UR QL SCN: NOT DETECTED
TEMAZEPAM UR QL: NOT DETECTED
TRAMADOL UR QL: NOT DETECTED
ZOLPIDEM METABOLITE: NOT DETECTED
ZOLPIDEM UR QL: NOT DETECTED

## 2022-05-13 ENCOUNTER — APPOINTMENT (OUTPATIENT)
Dept: FAMILY MEDICINE | Facility: CLINIC | Age: 62
End: 2022-05-13
Payer: COMMERCIAL

## 2022-05-13 ENCOUNTER — PATIENT MESSAGE (OUTPATIENT)
Dept: PRIMARY CARE CLINIC | Facility: CLINIC | Age: 62
End: 2022-05-13
Payer: COMMERCIAL

## 2022-05-13 DIAGNOSIS — R50.9 FEVER, UNSPECIFIED FEVER CAUSE: Primary | ICD-10-CM

## 2022-05-13 DIAGNOSIS — R50.9 FEVER, UNSPECIFIED FEVER CAUSE: ICD-10-CM

## 2022-05-13 DIAGNOSIS — U07.1 COVID-19 VIRUS DETECTED: ICD-10-CM

## 2022-05-13 LAB — SARS-COV-2 RDRP RESP QL NAA+PROBE: POSITIVE

## 2022-05-13 PROCEDURE — U0002 COVID-19 LAB TEST NON-CDC: HCPCS | Performed by: EMERGENCY MEDICINE

## 2022-07-29 ENCOUNTER — OFFICE VISIT (OUTPATIENT)
Dept: PAIN MEDICINE | Facility: CLINIC | Age: 62
End: 2022-07-29
Payer: COMMERCIAL

## 2022-07-29 DIAGNOSIS — M47.818 ARTHROPATHY OF RIGHT SACROILIAC JOINT: ICD-10-CM

## 2022-07-29 DIAGNOSIS — Z79.891 CHRONIC USE OF OPIATE FOR THERAPEUTIC PURPOSE: ICD-10-CM

## 2022-07-29 DIAGNOSIS — M51.36 DDD (DEGENERATIVE DISC DISEASE), LUMBAR: Primary | ICD-10-CM

## 2022-07-29 DIAGNOSIS — G89.4 CHRONIC PAIN SYNDROME: Primary | ICD-10-CM

## 2022-07-29 DIAGNOSIS — M54.16 LUMBAR RADICULOPATHY: ICD-10-CM

## 2022-07-29 PROCEDURE — 80326 AMPHETAMINES 5 OR MORE: CPT

## 2022-07-29 PROCEDURE — 99999 PR PBB SHADOW E&M-EST. PATIENT-LVL III: ICD-10-PCS | Mod: PBBFAC,,,

## 2022-07-29 PROCEDURE — 99214 OFFICE O/P EST MOD 30 MIN: CPT | Mod: S$GLB,,,

## 2022-07-29 PROCEDURE — 1160F RVW MEDS BY RX/DR IN RCRD: CPT | Mod: S$GLB,,,

## 2022-07-29 PROCEDURE — 99999 PR PBB SHADOW E&M-EST. PATIENT-LVL III: CPT | Mod: PBBFAC,,,

## 2022-07-29 PROCEDURE — 1160F PR REVIEW ALL MEDS BY PRESCRIBER/CLIN PHARMACIST DOCUMENTED: ICD-10-PCS | Mod: S$GLB,,,

## 2022-07-29 PROCEDURE — 80307 DRUG TEST PRSMV CHEM ANLYZR: CPT

## 2022-07-29 PROCEDURE — 99214 PR OFFICE/OUTPT VISIT, EST, LEVL IV, 30-39 MIN: ICD-10-PCS | Mod: S$GLB,,,

## 2022-07-29 PROCEDURE — 1159F MED LIST DOCD IN RCRD: CPT | Mod: S$GLB,,,

## 2022-07-29 PROCEDURE — 1159F PR MEDICATION LIST DOCUMENTED IN MEDICAL RECORD: ICD-10-PCS | Mod: S$GLB,,,

## 2022-07-29 RX ORDER — CELECOXIB 200 MG/1
200 CAPSULE ORAL 2 TIMES DAILY
Qty: 180 CAPSULE | Refills: 0 | Status: SHIPPED | OUTPATIENT
Start: 2022-07-29 | End: 2022-10-25 | Stop reason: SDUPTHER

## 2022-07-29 RX ORDER — HYDROCODONE BITARTRATE AND ACETAMINOPHEN 10; 325 MG/1; MG/1
1 TABLET ORAL EVERY 6 HOURS PRN
Qty: 120 TABLET | Refills: 0 | Status: SHIPPED | OUTPATIENT
Start: 2022-10-03 | End: 2022-10-25 | Stop reason: SDUPTHER

## 2022-07-29 RX ORDER — HYDROCODONE BITARTRATE AND ACETAMINOPHEN 10; 325 MG/1; MG/1
1 TABLET ORAL EVERY 6 HOURS PRN
Qty: 120 TABLET | Refills: 0 | Status: SHIPPED | OUTPATIENT
Start: 2022-08-08 | End: 2022-10-25 | Stop reason: SDUPTHER

## 2022-07-29 RX ORDER — HYDROCODONE BITARTRATE AND ACETAMINOPHEN 10; 325 MG/1; MG/1
1 TABLET ORAL EVERY 6 HOURS PRN
Qty: 120 TABLET | Refills: 0 | Status: SHIPPED | OUTPATIENT
Start: 2022-09-05 | End: 2022-10-25 | Stop reason: SDUPTHER

## 2022-07-29 RX ORDER — GABAPENTIN 600 MG/1
600 TABLET ORAL 3 TIMES DAILY
Qty: 270 TABLET | Refills: 0 | Status: SHIPPED | OUTPATIENT
Start: 2022-07-29 | End: 2022-10-25 | Stop reason: SDUPTHER

## 2022-07-29 NOTE — PROGRESS NOTES
FOLLOW UP NOTE:     CHIEF COMPLAINT: right buttock/hip area    INITIAL HISTORY OF PRESENT ILLNESS: Patrica Donato is a 60 y.o. female with PMH significant for HTN, hypothyroidism, and hx of right ankle surgery X 3 presents as a referral for the evaluation of right buttock pain. The patient reports that her pain began approximately October 2020 after no inciting incident or trauma. The patient denies of experiencing this pain in the past. The patient localizes her pain to right buttock. The patient reports of radiation down the posterior aspect of her RLE to her ankle. The patient describes her pain as an aching type of pain. The patient denies of numbness. The patient reports that her pain is a 5/10. Patient denies of any urinary/fecal incontinence, saddle anesthesia, or weakness.      The patient reports that she has had difficulty with sleeping secondary to her pain. She reports that she believes she has piriformis syndrome.      Aggravating factors: movement, prolonged sitting     Mitigating factors: ice, laying down     INTERVAL HISTORY OF PRESENT ILLNESS: Patrica Donato is a 62 y.o. female with PMH significant for HTN, hypothyroidism, and hx of right ankle surgery X 3 presents as an established patient, new to me,  for the continued management of right buttock/hip pain. Today, the patient continues to localize her pain to the right side of her lower back/hip/buttock area. She reports of intermittent radiation down her RLE to her knee and ankle. She reports of benefit with Norco  mg PO TID/QID PRN. She states her pain medications allow her to function. The patient reports that her pain is worsened with activities such as vacuuming. The patient reports her pain is worse at night and when sitting in car for long periods of time. The patient denies of any significant changes in her health since her last appointment. The patient also denies of any changes in the character of her pain since her last  appointment.  The patient reports she completed PT without any benefit. The patient reports that her current pain is a 6/10. The patient reports that her pain can increase to a 10/10 at its worst. Patient denies of any urinary/fecal incontinence, saddle anesthesia, or weakness.      Of note, the patient continues to participate in home stretches and exercises as instructed by her chiropractor.     INTERVENTIONAL PAIN HISTORY:  9/15/2021: Lumbar interlaminar epidural steroid injection at L2-L3 under fluoroscopic guidance (right paramedian approach) - no relief  6/10/2021: Right SI joint injection (Dr. Israel) - 60% benefit x 3 weeks.  4/29/2021: Right SI joint injection (Dr. Israel) - 40% benefit X 3 weeks  3/11/2021: Bilateral L5 - S1 transforaminal CAROL () - minimal benefit  1/21/2021: L5 - S1 CAROL with right paramedian approach (Dr. Israel) - minimal benefit  12/30/2020: L5 - S1 interlaminar CAROL (Dr. Israel) - 60% relief for about 5 days then slowly increasing pain.     12/13/2019: Right knee 40 mg triamcinolone acetonide 40 mg/mL via Dr. Russ  5/3/2017: Left knee 40 mg triamcinolone acetonide 40 mg/mL via Dr. Pablo  4/5/2017: Left knee 40 mg triamcinolone acetonide 40 mg/mL via Dr. Pablo     CURRENT PAIN MEDICATIONS:   Tylenol OTC as needed  Celebrex 200 mg PO BID  Norco  mg PO BID/TID  Gabapentin 600 mg PO TID        ROS:  Review of Systems   Constitutional: Negative for chills and fever.   HENT: Negative for sore throat.    Eyes: Negative for visual disturbance.   Respiratory: Negative for shortness of breath.    Cardiovascular: Negative for chest pain.   Gastrointestinal: Negative for nausea and vomiting.   Genitourinary: Negative for difficulty urinating.   Musculoskeletal: Positive for arthralgias, back pain and gait problem.   Skin: Negative for rash.   Allergic/Immunologic: Negative for immunocompromised state.   Neurological: Negative for syncope.   Hematological: Does not bruise/bleed easily.    Psychiatric/Behavioral: Negative for suicidal ideas.   All other systems reviewed and are negative.       MEDICAL, SURGICAL, FAMILY, SOCIAL HX: reviewed    MEDICATIONS/ALLERGIES: reviewed    PHYSICAL EXAM:    VITALS: Vitals reviewed.   There were no vitals filed for this visit.    Physical Exam  Vitals and nursing note reviewed.   Constitutional:       Appearance: She is not diaphoretic.   HENT:      Head: Normocephalic and atraumatic.   Eyes:      General:         Right eye: No discharge.         Left eye: No discharge.      Conjunctiva/sclera: Conjunctivae normal.   Cardiovascular:      Rate and Rhythm: Normal rate.   Pulmonary:      Effort: Pulmonary effort is normal. No respiratory distress.      Breath sounds: Normal breath sounds.   Abdominal:      Palpations: Abdomen is soft.   Skin:     General: Skin is warm and dry.      Findings: No rash.   Neurological:      Mental Status: She is alert and oriented to person, place, and time.   Psychiatric:         Mood and Affect: Mood and affect normal.         Cognition and Memory: Memory normal.         Judgment: Judgment normal.          UPPER EXTREMITIES: Normal alignment, normal range of motion, no atrophy, no skin changes,  hair growth and nail growth normal and equal bilaterally. No swelling, no tenderness.    LOWER EXTREMITIES:  Normal alignment, normal range of motion, no atrophy, no skin changes,  hair growth and nail growth normal and equal bilaterally. No swelling, no tenderness.     LUMBAR SPINE  Lumbar spine: ROM is full with flexion extension and oblique extension with no increased pain.     ((+)) Supine straight leg raise on the right   ((--)) Facet loading   Internal and external rotation of the hip causes increased pain on the right side. TTP at the site of the left greater trochanter.   Myofascial exam: No tenderness to palpation across lumbar paraspinous muscles.     MOTOR: Tone and bulk: normal bilateral upper and lower Strength: normal    Delt      Bi         Tri        WE      WF                        R          5          5          5          5          5          5            L          5          5          5          5          5          5               IP         ADD     ABD     Quad   TA        Gas      HAM  R          5          5          5          5          5          5          5  L          5          5          5          5          5          5          5     SENSATION: Light touch and pinprick intact bilaterally  REFLEXES: normal, symmetric, nonbrisk.  Toes down, no clonus. Negative forrest's sign bilaterally.  GAIT: normal rise, base, steps, and arm swing.      IMAGING: no new imaging to review    ASSESSMENT: Patrica Donato is a 61 y.o. female with PMH significant for HTN, hypothyroidism, and hx of right ankle surgery X 3 presents as an established patient, new to me, for the continued management of right buttock/hip pain. Today, the patient continues to localize her pain to the right side of her lower back/hip/buttock area with intermittent radiation down her RLE to her knee and ankle. She reports of benefit with Norco  mg PO TID/QID PRN without adverse side effects. She states her pain medications allow her to function. The patient presents today for a medication refill. Treatment plan outlined below.   Encounter Diagnoses   Name Primary?    Chronic use of opiate for therapeutic purpose     Lumbar radiculopathy     DDD (degenerative disc disease), lumbar Yes    Arthropathy of right sacroiliac joint        PLAN:  1. Can repeat right SI joint injection in the future PRN as that has provided her with the most relief when compared to her other interventions. Pt deferred at this time due to minimal relief with procedure   2. I have stressed the importance of physical activity and a home exercise plan to help with chronic pain and improve health.  3. Refilled Norco  mg PO q 6 hr PRN for breakthrough pain; #  120 tablets; 2 refills.  reviewed without discrepancies.   4. UDS collected today, last took pain medication this morning.   5. Refilled Celebrex 200 mg PO BID, #60 tablets, 2 refills.    6. Refilled Gabapentin   7. Discussed MBB/RFA procedure, pt wishes to proceed with procedure pending insurance approval. Will review previous insurance denial.   8. RTC in 3 months for follow-up, medication refill, and repeat UDS  All medication management performed by Dr. Israel.   Katerin Ledezma, NP

## 2022-08-03 LAB
6MAM UR QL: NOT DETECTED
7AMINOCLONAZEPAM UR QL: NOT DETECTED
A-OH ALPRAZ UR QL: NOT DETECTED
ALPHA-OH-MIDAZOLAM: NOT DETECTED
ALPRAZ UR QL: NOT DETECTED
AMPHET UR QL SCN: NOT DETECTED
ANNOTATION COMMENT IMP: NORMAL
ANNOTATION COMMENT IMP: NORMAL
BARBITURATES UR QL: NOT DETECTED
BUPRENORPHINE UR QL: NOT DETECTED
BZE UR QL: NOT DETECTED
CARBOXYTHC UR QL: NOT DETECTED
CARISOPRODOL UR QL: NOT DETECTED
CLONAZEPAM UR QL: NOT DETECTED
CODEINE UR QL: NOT DETECTED
CREAT UR-MCNC: 226.2 MG/DL (ref 20–400)
DIAZEPAM UR QL: NOT DETECTED
ETHYL GLUCURONIDE UR QL: NOT DETECTED
FENTANYL UR QL: NOT DETECTED
GABAPENTIN: PRESENT
HYDROCODONE UR QL: PRESENT
HYDROMORPHONE UR QL: NOT DETECTED
LORAZEPAM UR QL: NOT DETECTED
MDA UR QL: NOT DETECTED
MDEA UR QL: NOT DETECTED
MDMA UR QL: NOT DETECTED
ME-PHENIDATE UR QL: NOT DETECTED
METHADONE UR QL: NOT DETECTED
METHAMPHET UR QL: NOT DETECTED
MIDAZOLAM UR QL SCN: NOT DETECTED
MORPHINE UR QL: NOT DETECTED
NALOXONE: NOT DETECTED
NORBUPRENORPHINE UR QL CFM: NOT DETECTED
NORDIAZEPAM UR QL: NOT DETECTED
NORFENTANYL UR QL: NOT DETECTED
NORHYDROCODONE UR QL CFM: PRESENT
NORMEPERIDINE UR QL CFM: NOT DETECTED
NOROXYCODONE UR QL CFM: NOT DETECTED
NOROXYMORPHONE UR QL SCN: NOT DETECTED
OXAZEPAM UR QL: NOT DETECTED
OXYCODONE UR QL: NOT DETECTED
OXYMORPHONE UR QL: NOT DETECTED
PATHOLOGY STUDY: NORMAL
PCP UR QL: NOT DETECTED
PHENTERMINE UR QL: NOT DETECTED
PREGABALIN: NOT DETECTED
SERVICE CMNT-IMP: NORMAL
TAPENTADOL UR QL SCN: NOT DETECTED
TAPENTADOL UR QL SCN: NOT DETECTED
TEMAZEPAM UR QL: NOT DETECTED
TRAMADOL UR QL: NOT DETECTED
ZOLPIDEM METABOLITE: NOT DETECTED
ZOLPIDEM UR QL: NOT DETECTED

## 2022-10-25 ENCOUNTER — OFFICE VISIT (OUTPATIENT)
Dept: PAIN MEDICINE | Facility: CLINIC | Age: 62
End: 2022-10-25
Payer: COMMERCIAL

## 2022-10-25 DIAGNOSIS — Z79.891 CHRONIC USE OF OPIATE FOR THERAPEUTIC PURPOSE: ICD-10-CM

## 2022-10-25 DIAGNOSIS — G89.4 CHRONIC PAIN SYNDROME: Primary | ICD-10-CM

## 2022-10-25 DIAGNOSIS — M47.818 ARTHROPATHY OF RIGHT SACROILIAC JOINT: ICD-10-CM

## 2022-10-25 DIAGNOSIS — M51.36 DDD (DEGENERATIVE DISC DISEASE), LUMBAR: Primary | ICD-10-CM

## 2022-10-25 DIAGNOSIS — M54.16 LUMBAR RADICULOPATHY: ICD-10-CM

## 2022-10-25 PROCEDURE — 1159F MED LIST DOCD IN RCRD: CPT | Mod: S$GLB,,,

## 2022-10-25 PROCEDURE — 99214 PR OFFICE/OUTPT VISIT, EST, LEVL IV, 30-39 MIN: ICD-10-PCS | Mod: S$GLB,,,

## 2022-10-25 PROCEDURE — 1159F PR MEDICATION LIST DOCUMENTED IN MEDICAL RECORD: ICD-10-PCS | Mod: S$GLB,,,

## 2022-10-25 PROCEDURE — 99999 PR PBB SHADOW E&M-EST. PATIENT-LVL III: ICD-10-PCS | Mod: PBBFAC,,,

## 2022-10-25 PROCEDURE — 80326 AMPHETAMINES 5 OR MORE: CPT

## 2022-10-25 PROCEDURE — 99214 OFFICE O/P EST MOD 30 MIN: CPT | Mod: S$GLB,,,

## 2022-10-25 PROCEDURE — 99999 PR PBB SHADOW E&M-EST. PATIENT-LVL III: CPT | Mod: PBBFAC,,,

## 2022-10-25 RX ORDER — CELECOXIB 200 MG/1
200 CAPSULE ORAL 2 TIMES DAILY
Qty: 180 CAPSULE | Refills: 0 | Status: SHIPPED | OUTPATIENT
Start: 2022-10-25 | End: 2023-01-23

## 2022-10-25 RX ORDER — HYDROCODONE BITARTRATE AND ACETAMINOPHEN 10; 325 MG/1; MG/1
1 TABLET ORAL EVERY 6 HOURS PRN
Qty: 120 TABLET | Refills: 0 | Status: SHIPPED | OUTPATIENT
Start: 2022-12-06 | End: 2023-01-17 | Stop reason: SDUPTHER

## 2022-10-25 RX ORDER — HYDROCODONE BITARTRATE AND ACETAMINOPHEN 10; 325 MG/1; MG/1
1 TABLET ORAL EVERY 6 HOURS PRN
Qty: 120 TABLET | Refills: 0 | Status: SHIPPED | OUTPATIENT
Start: 2022-11-08 | End: 2023-01-17 | Stop reason: SDUPTHER

## 2022-10-25 RX ORDER — GABAPENTIN 600 MG/1
600 TABLET ORAL 3 TIMES DAILY
Qty: 270 TABLET | Refills: 0 | Status: SHIPPED | OUTPATIENT
Start: 2022-10-25 | End: 2023-01-17 | Stop reason: SDUPTHER

## 2022-10-25 RX ORDER — HYDROCODONE BITARTRATE AND ACETAMINOPHEN 10; 325 MG/1; MG/1
1 TABLET ORAL EVERY 6 HOURS PRN
Qty: 120 TABLET | Refills: 0 | Status: SHIPPED | OUTPATIENT
Start: 2023-01-03 | End: 2023-01-17 | Stop reason: SDUPTHER

## 2022-10-25 NOTE — PROGRESS NOTES
FOLLOW UP NOTE:     CHIEF COMPLAINT: right buttock/hip area    INITIAL HISTORY OF PRESENT ILLNESS: Patrica Donato is a 60 y.o. female with PMH significant for HTN, hypothyroidism, and hx of right ankle surgery X 3 presents as a referral for the evaluation of right buttock pain. The patient reports that her pain began approximately October 2020 after no inciting incident or trauma. The patient denies of experiencing this pain in the past. The patient localizes her pain to right buttock. The patient reports of radiation down the posterior aspect of her RLE to her ankle. The patient describes her pain as an aching type of pain. The patient denies of numbness. The patient reports that her pain is a 5/10. Patient denies of any urinary/fecal incontinence, saddle anesthesia, or weakness.      The patient reports that she has had difficulty with sleeping secondary to her pain. She reports that she believes she has piriformis syndrome.      Aggravating factors: movement, prolonged sitting     Mitigating factors: ice, laying down     INTERVAL HISTORY OF PRESENT ILLNESS: Patrica Donato is a 62 y.o. female with PMH significant for HTN, hypothyroidism, and hx of right ankle surgery X 3 presents as an established patient  for the continued management of right buttock/hip pain. The patient presents today for medication refill. Today, the patient continues to localize her pain to the right side of her lower back/hip/buttock area. She reports of intermittent radiation down her RLE to her knee and ankle. She reports of benefit with Norco  mg PO TID/QID PRN. She states her pain medications allow her to function. The patient reports that her pain is worsened with activities such as vacuuming. The patient reports her pain is worse at night and when sitting in car for long periods of time. The patient reports possible knee injury following a recent trip which required excessive walking.  The patient reports she is awaiting an  Ortho appointment to discuss possible surgery. The patient denies of any significant changes in her health since her last appointment. The patient also denies of any changes in the character of her pain since her last appointment.  The patient reports she completed PT without any benefit. The patient reports that her current pain is a 6/10. The patient reports that her pain can increase to a 10/10 at its worst. Patient denies of any urinary/fecal incontinence, saddle anesthesia, or weakness.      Of note, the patient continues to participate in home stretches and exercises as instructed by her chiropractor.     INTERVENTIONAL PAIN HISTORY:  9/15/2021: Lumbar interlaminar epidural steroid injection at L2-L3 under fluoroscopic guidance (right paramedian approach) - no relief  6/10/2021: Right SI joint injection (Dr. Israel) - 60% benefit x 3 weeks.  4/29/2021: Right SI joint injection (Dr. Israel) - 40% benefit X 3 weeks  3/11/2021: Bilateral L5 - S1 transforaminal CAROL () - minimal benefit  1/21/2021: L5 - S1 CAROL with right paramedian approach (Dr. Israel) - minimal benefit  12/30/2020: L5 - S1 interlaminar CAROL (Dr. Israel) - 60% relief for about 5 days then slowly increasing pain.     12/13/2019: Right knee 40 mg triamcinolone acetonide 40 mg/mL via Dr. Russ  5/3/2017: Left knee 40 mg triamcinolone acetonide 40 mg/mL via Dr. Pablo  4/5/2017: Left knee 40 mg triamcinolone acetonide 40 mg/mL via Dr. Pablo     CURRENT PAIN MEDICATIONS:   Tylenol OTC as needed  Celebrex 200 mg PO BID  Norco  mg PO BID/TID  Gabapentin 600 mg PO TID        ROS:  Review of Systems   Constitutional: Negative for chills and fever.   HENT: Negative for sore throat.    Eyes: Negative for visual disturbance.   Respiratory: Negative for shortness of breath.    Cardiovascular: Negative for chest pain.   Gastrointestinal: Negative for nausea and vomiting.   Genitourinary: Negative for difficulty urinating.   Musculoskeletal: Positive  for arthralgias, back pain and gait problem.   Skin: Negative for rash.   Allergic/Immunologic: Negative for immunocompromised state.   Neurological: Negative for syncope.   Hematological: Does not bruise/bleed easily.   Psychiatric/Behavioral: Negative for suicidal ideas.   All other systems reviewed and are negative.       MEDICAL, SURGICAL, FAMILY, SOCIAL HX: reviewed    MEDICATIONS/ALLERGIES: reviewed    PHYSICAL EXAM:    VITALS: Vitals reviewed.   There were no vitals filed for this visit.    Physical Exam  Vitals and nursing note reviewed.   Constitutional:       Appearance: She is not diaphoretic.   HENT:      Head: Normocephalic and atraumatic.   Eyes:      General:         Right eye: No discharge.         Left eye: No discharge.      Conjunctiva/sclera: Conjunctivae normal.   Cardiovascular:      Rate and Rhythm: Normal rate.   Pulmonary:      Effort: Pulmonary effort is normal. No respiratory distress.      Breath sounds: Normal breath sounds.   Abdominal:      Palpations: Abdomen is soft.   Skin:     General: Skin is warm and dry.      Findings: No rash.   Neurological:      Mental Status: She is alert and oriented to person, place, and time.   Psychiatric:         Mood and Affect: Mood and affect normal.         Cognition and Memory: Memory normal.         Judgment: Judgment normal.          UPPER EXTREMITIES: Normal alignment, normal range of motion, no atrophy, no skin changes,  hair growth and nail growth normal and equal bilaterally. No swelling, no tenderness.    LOWER EXTREMITIES:  Normal alignment, normal range of motion, no atrophy, no skin changes,  hair growth and nail growth normal and equal bilaterally. No swelling, no tenderness.     LUMBAR SPINE  Lumbar spine: ROM is full with flexion extension and oblique extension with no increased pain.     ((+)) Supine straight leg raise on the right   ((--)) Facet loading   Internal and external rotation of the hip causes increased pain on the right  side. TTP at the site of the left greater trochanter.   Myofascial exam: No tenderness to palpation across lumbar paraspinous muscles.     MOTOR: Tone and bulk: normal bilateral upper and lower Strength: normal   Delt      Bi         Tri        WE      WF                        R          5          5          5          5          5          5            L          5          5          5          5          5          5               IP         ADD     ABD     Quad   TA        Gas      HAM  R          5          5          5          5          5          5          5  L          5          5          5          5          5          5          5     SENSATION: Light touch and pinprick intact bilaterally  REFLEXES: normal, symmetric, nonbrisk.  Toes down, no clonus. Negative forrest's sign bilaterally.  GAIT: normal rise, base, steps, and arm swing.      IMAGING: no new imaging to review    ASSESSMENT: Patrica Donato is a 61 y.o. female with PMH significant for HTN, hypothyroidism, and hx of right ankle surgery X 3 presents as an established patient, new to me, for the continued management of right buttock/hip pain. Today, the patient continues to localize her pain to the right side of her lower back/hip/buttock area with intermittent radiation down her RLE to her knee and ankle. She reports of benefit with Norco  mg PO TID/QID PRN without adverse side effects. She states her pain medications allow her to function. The patient presents today for a medication refill. Treatment plan outlined below.   Encounter Diagnoses   Name Primary?    Chronic use of opiate for therapeutic purpose     DDD (degenerative disc disease), lumbar Yes    Arthropathy of right sacroiliac joint     Lumbar radiculopathy        PLAN:    - I have stressed the importance of physical activity and a home exercise plan to help with chronic pain and improve health.  - Refilled Norco  mg PO q 6 hr PRN for breakthrough pain; # 120  tablets; 2 refills.  reviewed without discrepancies.   - UDS collected today, last took pain medication this morning.   - Refilled Celebrex 200 mg PO BID, #60 tablets, 2 refills.    - Refilled Gabapentin   - Discussed MBB/RFA, informed patient that documented PT trialed is needed in order to receive insurance approval.   - RTC in 3 months for follow-up, medication refill, and repeat UDS  All medication management performed by Dr. Israel.   Katerin Ledezma, NP

## 2022-10-30 LAB
6MAM UR QL: NOT DETECTED
7AMINOCLONAZEPAM UR QL: NOT DETECTED
A-OH ALPRAZ UR QL: NOT DETECTED
ALPHA-OH-MIDAZOLAM: NOT DETECTED
ALPRAZ UR QL: NOT DETECTED
AMPHET UR QL SCN: NOT DETECTED
ANNOTATION COMMENT IMP: NORMAL
ANNOTATION COMMENT IMP: NORMAL
BARBITURATES UR QL: NOT DETECTED
BUPRENORPHINE UR QL: NOT DETECTED
BZE UR QL: NOT DETECTED
CARBOXYTHC UR QL: NOT DETECTED
CARISOPRODOL UR QL: NOT DETECTED
CLONAZEPAM UR QL: NOT DETECTED
CODEINE UR QL: NOT DETECTED
CREAT UR-MCNC: 292.3 MG/DL (ref 20–400)
DIAZEPAM UR QL: NOT DETECTED
ETHYL GLUCURONIDE UR QL: NOT DETECTED
FENTANYL UR QL: NOT DETECTED
GABAPENTIN: PRESENT
HYDROCODONE UR QL: PRESENT
HYDROMORPHONE UR QL: PRESENT
LORAZEPAM UR QL: NOT DETECTED
MDA UR QL: NOT DETECTED
MDEA UR QL: NOT DETECTED
MDMA UR QL: NOT DETECTED
ME-PHENIDATE UR QL: NOT DETECTED
METHADONE UR QL: NOT DETECTED
METHAMPHET UR QL: NOT DETECTED
MIDAZOLAM UR QL SCN: NOT DETECTED
MORPHINE UR QL: NOT DETECTED
NALOXONE: NOT DETECTED
NORBUPRENORPHINE UR QL CFM: NOT DETECTED
NORDIAZEPAM UR QL: NOT DETECTED
NORFENTANYL UR QL: NOT DETECTED
NORHYDROCODONE UR QL CFM: PRESENT
NORMEPERIDINE UR QL CFM: NOT DETECTED
NOROXYCODONE UR QL CFM: NOT DETECTED
NOROXYMORPHONE UR QL SCN: NOT DETECTED
OXAZEPAM UR QL: NOT DETECTED
OXYCODONE UR QL: NOT DETECTED
OXYMORPHONE UR QL: NOT DETECTED
PATHOLOGY STUDY: NORMAL
PCP UR QL: NOT DETECTED
PHENTERMINE UR QL: NOT DETECTED
PREGABALIN: NOT DETECTED
SERVICE CMNT-IMP: NORMAL
TAPENTADOL UR QL SCN: NOT DETECTED
TAPENTADOL UR QL SCN: NOT DETECTED
TEMAZEPAM UR QL: NOT DETECTED
TRAMADOL UR QL: NOT DETECTED
ZOLPIDEM METABOLITE: NOT DETECTED
ZOLPIDEM UR QL: NOT DETECTED

## 2022-11-02 ENCOUNTER — TELEPHONE (OUTPATIENT)
Dept: FAMILY MEDICINE | Facility: CLINIC | Age: 62
End: 2022-11-02
Payer: COMMERCIAL

## 2022-11-02 DIAGNOSIS — Z12.31 ENCOUNTER FOR SCREENING MAMMOGRAM FOR MALIGNANT NEOPLASM OF BREAST: Primary | ICD-10-CM

## 2022-11-04 DIAGNOSIS — M25.562 LEFT KNEE PAIN, UNSPECIFIED CHRONICITY: Primary | ICD-10-CM

## 2022-11-07 ENCOUNTER — HOSPITAL ENCOUNTER (OUTPATIENT)
Dept: RADIOLOGY | Facility: HOSPITAL | Age: 62
Discharge: HOME | End: 2022-11-07
Attending: ORTHOPAEDIC SURGERY
Payer: COMMERCIAL

## 2022-11-07 ENCOUNTER — OFFICE VISIT (OUTPATIENT)
Dept: ORTHOPEDICS | Facility: CLINIC | Age: 62
End: 2022-11-07
Payer: COMMERCIAL

## 2022-11-07 VITALS — WEIGHT: 175.06 LBS | BODY MASS INDEX: 28.14 KG/M2 | RESPIRATION RATE: 16 BRPM | HEIGHT: 66 IN

## 2022-11-07 DIAGNOSIS — M25.511 RIGHT SHOULDER PAIN, UNSPECIFIED CHRONICITY: ICD-10-CM

## 2022-11-07 DIAGNOSIS — M25.562 LEFT KNEE PAIN, UNSPECIFIED CHRONICITY: ICD-10-CM

## 2022-11-07 DIAGNOSIS — M17.12 PRIMARY OSTEOARTHRITIS OF LEFT KNEE: Primary | ICD-10-CM

## 2022-11-07 PROCEDURE — 1160F PR REVIEW ALL MEDS BY PRESCRIBER/CLIN PHARMACIST DOCUMENTED: ICD-10-PCS | Mod: S$GLB,,, | Performed by: ORTHOPAEDIC SURGERY

## 2022-11-07 PROCEDURE — 20610 PR DRAIN/INJECT LARGE JOINT/BURSA: ICD-10-PCS | Mod: LT,S$GLB,, | Performed by: ORTHOPAEDIC SURGERY

## 2022-11-07 PROCEDURE — 73562 XR KNEE 3 VIEW LEFT: ICD-10-PCS | Mod: 26,LT,, | Performed by: RADIOLOGY

## 2022-11-07 PROCEDURE — 3008F BODY MASS INDEX DOCD: CPT | Mod: S$GLB,,, | Performed by: ORTHOPAEDIC SURGERY

## 2022-11-07 PROCEDURE — 1159F MED LIST DOCD IN RCRD: CPT | Mod: S$GLB,,, | Performed by: ORTHOPAEDIC SURGERY

## 2022-11-07 PROCEDURE — 99214 OFFICE O/P EST MOD 30 MIN: CPT | Mod: 25,S$GLB,, | Performed by: ORTHOPAEDIC SURGERY

## 2022-11-07 PROCEDURE — 3008F PR BODY MASS INDEX (BMI) DOCUMENTED: ICD-10-PCS | Mod: S$GLB,,, | Performed by: ORTHOPAEDIC SURGERY

## 2022-11-07 PROCEDURE — 99214 PR OFFICE/OUTPT VISIT, EST, LEVL IV, 30-39 MIN: ICD-10-PCS | Mod: 25,S$GLB,, | Performed by: ORTHOPAEDIC SURGERY

## 2022-11-07 PROCEDURE — 73562 X-RAY EXAM OF KNEE 3: CPT | Mod: 26,LT,, | Performed by: RADIOLOGY

## 2022-11-07 PROCEDURE — 73030 X-RAY EXAM OF SHOULDER: CPT | Mod: TC,PN,RT

## 2022-11-07 PROCEDURE — 99999 PR PBB SHADOW E&M-EST. PATIENT-LVL III: ICD-10-PCS | Mod: PBBFAC,,, | Performed by: ORTHOPAEDIC SURGERY

## 2022-11-07 PROCEDURE — 73562 X-RAY EXAM OF KNEE 3: CPT | Mod: TC,PN,LT

## 2022-11-07 PROCEDURE — 20610 DRAIN/INJ JOINT/BURSA W/O US: CPT | Mod: LT,S$GLB,, | Performed by: ORTHOPAEDIC SURGERY

## 2022-11-07 PROCEDURE — 99999 PR PBB SHADOW E&M-EST. PATIENT-LVL III: CPT | Mod: PBBFAC,,, | Performed by: ORTHOPAEDIC SURGERY

## 2022-11-07 PROCEDURE — 1160F RVW MEDS BY RX/DR IN RCRD: CPT | Mod: S$GLB,,, | Performed by: ORTHOPAEDIC SURGERY

## 2022-11-07 PROCEDURE — 73030 X-RAY EXAM OF SHOULDER: CPT | Mod: 26,RT,, | Performed by: RADIOLOGY

## 2022-11-07 PROCEDURE — 73030 XR SHOULDER TRAUMA 3 VIEW RIGHT: ICD-10-PCS | Mod: 26,RT,, | Performed by: RADIOLOGY

## 2022-11-07 PROCEDURE — 1159F PR MEDICATION LIST DOCUMENTED IN MEDICAL RECORD: ICD-10-PCS | Mod: S$GLB,,, | Performed by: ORTHOPAEDIC SURGERY

## 2022-11-07 RX ORDER — LIDOCAINE HYDROCHLORIDE 10 MG/ML
5 INJECTION INFILTRATION; PERINEURAL
Status: DISCONTINUED | OUTPATIENT
Start: 2022-11-07 | End: 2022-11-07 | Stop reason: HOSPADM

## 2022-11-07 RX ADMIN — LIDOCAINE HYDROCHLORIDE 5 ML: 10 INJECTION INFILTRATION; PERINEURAL at 08:11

## 2022-11-07 NOTE — PROCEDURES
Large Joint Aspiration/Injection: L knee joint    Date/Time: 11/7/2022 8:00 AM  Performed by: Hayes Lim MD  Authorized by: Hayes Lim MD     Consent Done?:  Yes (Verbal)  Indications:  Pain and arthritis  Site marked: the procedure site was marked    Timeout: prior to procedure the correct patient, procedure, and site was verified    Local anesthesia used?: No      Details:  Needle Size:  21 G  Ultrasonic Guidance for needle placement?: No    Approach:  Anterolateral  Location:  Knee  Site:  L knee joint  Medications:  5 mL LIDOcaine HCL 10 mg/ml (1%) 10 mg/mL (1 %); 5 mg triamcinolone acetonide 10 mg/mL  Patient tolerance:  Patient tolerated the procedure well with no immediate complications

## 2022-11-07 NOTE — PROGRESS NOTES
Subjective:      Patient ID: Patrica Donato is a 62 y.o. female.    Chief Complaint: Pain of the Left Knee and Pain of the Right Shoulder    62-year-old right-hand-dominant female with a long history of problems with her left knee.  She has had a knee arthroscopy in the past but does not remember if it was the left or right knee.  She remembers doing reasonably well for a period of time after her knee scope.  She states she was seen about 2 years ago she bleeds for her left knee for an injection and had significant pain relief.  She gets pain management take Celebrex for an SI joint dysfunction and low back pain pain from a pain management physician.  She states that she recently tweaked her left knee while on vacation.  She is complaining of pain with prolonged standing and walking she intermittently has swelling that seems to improve with relative rest.  She is also complaining of right shoulder pain clicking cracking and grinding difficulty with range of motion and some weakness.  Marginal improvement with NSAIDs.  She states she does have neck pain and has degenerative disc disease which occasionally radiates into her shoulders.  Denies any hand pain tingling or numbness.    Pain  Associated symptoms include joint swelling.   Review of Systems   Constitutional: Negative.   HENT: Negative.     Eyes: Negative.    Respiratory: Negative.     Skin: Negative.    Musculoskeletal:  Positive for arthritis, back pain, joint pain and joint swelling.   Otherwise per HPI      Objective:    Ortho Exam    Constitutional:   Patient is alert  and oriented in no acute distress  HEENT:  normocephalic atraumatic; PERRL EOMI  Neck:  Supple without adenopathy  Cardiovascular:  Normal rate and rhythm  Pulmonary:  Normal respiratory effort normal chest wall expansion  Abdominal:  Nonprotuberant nondistended  Musculoskeletal:  Patient has a steady minimally antalgic gait she has good range of motion lacking maybe a degree or so of  full extension.  She has crepitus with range of motion  There is diffuse primarily peripatellar and lateral joint line tenderness there is no gross instability of her knee she has a trace effusion.  She has adequate motor strength.  She has a normal distal neurologic and vascular examination.  There is no increased warmth or erythema.  She has mild limitation of cervical spine range of motion there is negative Lhermitte's and Spurling she lacks few degrees of forward flexion internal rotation of her right shoulder.  She has a positive impingement sign there is no gross weakness or instability  Neurological:  No focal defect; cranial nerves 2-12 grossly intact  Psychiatric/behavioral:  Mood and behavior normal      FL Fluoro for Pain Management  See OP Notes for results.     IMPRESSION: See OP Notes for results.     This procedure was auto-finalized by: Virtual Radiologist       My Findings:    Radiographs of her left knee taken in the outpatient imaging center today are consistent with moderate degenerative changes with osteophyte formation subchondral sclerosis and joint space narrowing.  Views of her right shoulder consistent with some degenerative change particularly of the AC joint and calcification in the supraspinatus tendon.  No acute abnormalities noted.    Assessment:       Encounter Diagnoses   Name Primary?    Right shoulder pain, unspecified chronicity     Primary osteoarthritis of left knee Yes         Plan:       I have discussed medical condition and treatment options with her at length.  After a verbal consent and sterile prep I injected her left knee with combination of a cc of Kenalog 4 cc of lidocaine.  Will get her physical therapy program for stretching and strengthening exercises for both her shoulder and her left knee.  She will continue with Celebrex per treating physician's recommendations.  GI cardiac and renal precautions were discussed.  We could consider an injection into her right  shoulder symptoms fail to improve.     Past Medical History:   Diagnosis Date    Arthritis     Hypertension     Pain, chronic     Thyroid disease      Past Surgical History:   Procedure Laterality Date    ANKLE SURGERY  2000    APPENDECTOMY  2015    CHOLECYSTECTOMY      COLONOSCOPY N/A 9/24/2020    Procedure: COLONOSCOPY - screening, high risk screening, or diagnostic.  (See H&P);  Surgeon: Ovidio Carrillo MD;  Location: [facility] ENDO;  Service: General;  Laterality: N/A;    EPIDURAL STEROID INJECTION N/A 12/30/2020    Procedure: CAROL Lumbar L5, S1;  Surgeon: Guy Israel MD;  Location: [facility] OR;  Service: Pain Management;  Laterality: N/A;    EPIDURAL STEROID INJECTION N/A 1/21/2021    Procedure: Injection, Steroid, Epidural L5-S1;  Surgeon: Guy Israel MD;  Location: [facility] OR;  Service: Pain Management;  Laterality: N/A;  oral , wants to be first case    EPIDURAL STEROID INJECTION Bilateral 3/11/2021    Procedure: Injection, Steroid, Epidural TFESI L5-S1;  Surgeon: Guy Israel MD;  Location: [facility] OR;  Service: Pain Management;  Laterality: Bilateral;  Oral    EPIDURAL STEROID INJECTION N/A 9/15/2021    Procedure: Injection, Steroid, Epidural; L2 - L3;  Surgeon: Guy Israel MD;  Location: [facility] OR;  Service: Pain Management;  Laterality: N/A;  MAKE 2ND PAIN PATIENT SO SHE CAN GO BACK TO WORK    ESOPHAGOGASTRODUODENOSCOPY N/A 9/24/2020    Procedure: ESOPHAGOGASTRODUODENOSCOPY (EGD);  Surgeon: Ovidio Carrillo MD;  Location: [facility];  Service: General;  Laterality: N/A;    HAND SURGERY  2012    finger     HYSTERECTOMY      INJECTION OF JOINT Right 6/10/2021    Procedure: Injection, Joint,Sacroiliac;  Surgeon: Guy Israel MD;  Location: [facility] OR;  Service: Pain Management;  Laterality: Right;  Please schedule patient first so she can go back to work. Podiatry     INJECTION OF STEROID Right 4/29/2021    Procedure: INJECTION, STEROID, SACROILIAC JOINT;  Surgeon: Guy Israel MD;  [facility];   Service: Pain Management;  Laterality: Right;  ORAL    KNEE ARTHROSCOPY  2011    LAPAROSCOPIC CHOLECYSTECTOMY N/A 10/2/2018    Procedure: CHOLECYSTECTOMY-LAPAROSCOPIC;  Surgeon: Ovidio Carrillo MD;  Location: [facility] OR;  Service: General;  Laterality: N/A;    TONSILLECTOMY  1965         Current Outpatient Medications:     amLODIPine (NORVASC) 5 MG tablet, Take 1 tablet (5 mg total) by mouth once daily., Disp: 90 tablet, Rfl: 3    celecoxib (CELEBREX) 200 MG capsule, Take 1 capsule (200 mg total) by mouth 2 (two) times daily., Disp: 180 capsule, Rfl: 0    esomeprazole (NEXIUM) 20 MG capsule, Take 1 capsule (20 mg total) by mouth before breakfast., Disp: 90 capsule, Rfl: 3    gabapentin (NEURONTIN) 600 MG tablet, Take 1 tablet (600 mg total) by mouth 3 (three) times daily., Disp: 270 tablet, Rfl: 0    [START ON 11/8/2022] HYDROcodone-acetaminophen (NORCO)  mg per tablet, Take 1 tablet by mouth every 6 (six) hours as needed for Pain., Disp: 120 tablet, Rfl: 0    [START ON 12/6/2022] HYDROcodone-acetaminophen (NORCO)  mg per tablet, Take 1 tablet by mouth every 6 (six) hours as needed for Pain., Disp: 120 tablet, Rfl: 0    [START ON 1/3/2023] HYDROcodone-acetaminophen (NORCO)  mg per tablet, Take 1 tablet by mouth every 6 (six) hours as needed for Pain., Disp: 120 tablet, Rfl: 0    levothyroxine (SYNTHROID) 125 MCG tablet, Take 1 tablet (125 mcg total) by mouth before breakfast., Disp: 90 tablet, Rfl: 3    metoprolol succinate (TOPROL-XL) 100 MG 24 hr tablet, Take 1 tablet (100 mg total) by mouth once daily., Disp: 90 tablet, Rfl: 3    pantoprazole (PROTONIX) 40 MG tablet, Take 40 mg by mouth once daily., Disp: , Rfl:     pravastatin (PRAVACHOL) 20 MG tablet, Take 1 tablet (20 mg total) by mouth every evening., Disp: 90 tablet, Rfl: 3    Review of patient's allergies indicates:   Allergen Reactions    Cerave [ceramides 1,3,6-ii] Rash       Family History   Problem Relation Age of Onset    Dementia  Mother     Cancer Father      Social History     Occupational History    Not on file   Tobacco Use    Smoking status: Former     Types: Cigarettes     Quit date: 2015     Years since quittin.8    Smokeless tobacco: Never   Substance and Sexual Activity    Alcohol use: Yes     Alcohol/week: 2.0 standard drinks     Types: 2 Glasses of wine per week    Drug use: Never    Sexual activity: Yes     Partners: Male     Birth control/protection: See Surgical Hx

## 2022-11-11 ENCOUNTER — HOSPITAL ENCOUNTER (OUTPATIENT)
Dept: RADIOLOGY | Facility: HOSPITAL | Age: 62
Discharge: HOME | End: 2022-11-11
Attending: FAMILY MEDICINE
Payer: COMMERCIAL

## 2022-11-11 VITALS — BODY MASS INDEX: 27.99 KG/M2 | WEIGHT: 174.19 LBS | HEIGHT: 66 IN

## 2022-11-11 DIAGNOSIS — Z12.31 ENCOUNTER FOR SCREENING MAMMOGRAM FOR MALIGNANT NEOPLASM OF BREAST: ICD-10-CM

## 2022-11-11 PROCEDURE — 77067 MAMMO DIGITAL SCREENING BILAT WITH TOMO: ICD-10-PCS | Mod: 26,,, | Performed by: RADIOLOGY

## 2022-11-11 PROCEDURE — 77063 BREAST TOMOSYNTHESIS BI: CPT | Mod: TC

## 2022-11-11 PROCEDURE — 77067 SCR MAMMO BI INCL CAD: CPT | Mod: TC

## 2022-11-11 PROCEDURE — 77063 MAMMO DIGITAL SCREENING BILAT WITH TOMO: ICD-10-PCS | Mod: 26,,, | Performed by: RADIOLOGY

## 2022-11-11 PROCEDURE — 77067 SCR MAMMO BI INCL CAD: CPT | Mod: 26,,, | Performed by: RADIOLOGY

## 2022-11-11 PROCEDURE — 77063 BREAST TOMOSYNTHESIS BI: CPT | Mod: 26,,, | Performed by: RADIOLOGY

## 2022-11-14 DIAGNOSIS — R92.8 ABNORMAL MAMMOGRAM: Primary | ICD-10-CM

## 2022-11-16 ENCOUNTER — TELEPHONE (OUTPATIENT)
Dept: FAMILY MEDICINE | Facility: CLINIC | Age: 62
End: 2022-11-16
Payer: COMMERCIAL

## 2022-11-16 NOTE — TELEPHONE ENCOUNTER
----- Message from Eryn Chandra NP sent at 11/14/2022  2:59 PM CST -----  Mammogram is incomplete and requires further imaging. I have placed these orders for her please call to schedule this.   Prateek FRANCES covering for Dr Talley

## 2022-11-17 ENCOUNTER — TELEPHONE (OUTPATIENT)
Dept: FAMILY MEDICINE | Facility: CLINIC | Age: 62
End: 2022-11-17
Payer: COMMERCIAL

## 2022-11-18 DIAGNOSIS — K29.50 CHRONIC GASTRITIS WITHOUT BLEEDING, UNSPECIFIED GASTRITIS TYPE: Primary | ICD-10-CM

## 2022-11-18 RX ORDER — PANTOPRAZOLE SODIUM 40 MG/1
40 TABLET, DELAYED RELEASE ORAL DAILY
Qty: 90 TABLET | Refills: 1 | Status: SHIPPED | OUTPATIENT
Start: 2022-11-18 | End: 2023-06-15 | Stop reason: SDUPTHER

## 2022-11-23 ENCOUNTER — HOSPITAL ENCOUNTER (OUTPATIENT)
Dept: RADIOLOGY | Facility: HOSPITAL | Age: 62
Discharge: HOME | End: 2022-11-23
Attending: FAMILY MEDICINE
Payer: COMMERCIAL

## 2022-11-23 DIAGNOSIS — R92.8 ABNORMALITY OF BOTH BREASTS ON SCREENING MAMMOGRAM: ICD-10-CM

## 2022-11-23 DIAGNOSIS — N63.0 BREAST MASS SEEN ON MAMMOGRAM: ICD-10-CM

## 2022-11-23 PROCEDURE — 77066 MAMMO DIGITAL DIAGNOSTIC BILAT WITH TOMO: ICD-10-PCS | Mod: 26,,, | Performed by: RADIOLOGY

## 2022-11-23 PROCEDURE — 76642 ULTRASOUND BREAST LIMITED: CPT | Mod: 26,50,, | Performed by: RADIOLOGY

## 2022-11-23 PROCEDURE — 77062 MAMMO DIGITAL DIAGNOSTIC BILAT WITH TOMO: ICD-10-PCS | Mod: 26,,, | Performed by: RADIOLOGY

## 2022-11-23 PROCEDURE — 76642 ULTRASOUND BREAST LIMITED: CPT | Mod: TC,50

## 2022-11-23 PROCEDURE — 77062 BREAST TOMOSYNTHESIS BI: CPT | Mod: TC

## 2022-11-23 PROCEDURE — 76642 US BREAST BILATERAL LIMITED: ICD-10-PCS | Mod: 26,50,, | Performed by: RADIOLOGY

## 2022-11-23 PROCEDURE — 77062 BREAST TOMOSYNTHESIS BI: CPT | Mod: 26,,, | Performed by: RADIOLOGY

## 2022-11-23 PROCEDURE — 77066 DX MAMMO INCL CAD BI: CPT | Mod: 26,,, | Performed by: RADIOLOGY

## 2022-12-08 ENCOUNTER — OFFICE VISIT (OUTPATIENT)
Dept: ORTHOPEDICS | Facility: CLINIC | Age: 62
End: 2022-12-08
Payer: COMMERCIAL

## 2022-12-08 VITALS — RESPIRATION RATE: 16 BRPM | WEIGHT: 174.19 LBS | HEIGHT: 66 IN | BODY MASS INDEX: 27.99 KG/M2

## 2022-12-08 DIAGNOSIS — M25.511 RIGHT SHOULDER PAIN, UNSPECIFIED CHRONICITY: Primary | ICD-10-CM

## 2022-12-08 PROCEDURE — 99999 PR PBB SHADOW E&M-EST. PATIENT-LVL III: ICD-10-PCS | Mod: PBBFAC,,, | Performed by: ORTHOPAEDIC SURGERY

## 2022-12-08 PROCEDURE — 1160F PR REVIEW ALL MEDS BY PRESCRIBER/CLIN PHARMACIST DOCUMENTED: ICD-10-PCS | Mod: S$GLB,,, | Performed by: ORTHOPAEDIC SURGERY

## 2022-12-08 PROCEDURE — 99999 PR PBB SHADOW E&M-EST. PATIENT-LVL III: CPT | Mod: PBBFAC,,, | Performed by: ORTHOPAEDIC SURGERY

## 2022-12-08 PROCEDURE — 99213 PR OFFICE/OUTPT VISIT, EST, LEVL III, 20-29 MIN: ICD-10-PCS | Mod: 25,S$GLB,, | Performed by: ORTHOPAEDIC SURGERY

## 2022-12-08 PROCEDURE — 1159F PR MEDICATION LIST DOCUMENTED IN MEDICAL RECORD: ICD-10-PCS | Mod: S$GLB,,, | Performed by: ORTHOPAEDIC SURGERY

## 2022-12-08 PROCEDURE — 3008F BODY MASS INDEX DOCD: CPT | Mod: S$GLB,,, | Performed by: ORTHOPAEDIC SURGERY

## 2022-12-08 PROCEDURE — 20610 DRAIN/INJ JOINT/BURSA W/O US: CPT | Mod: RT,S$GLB,, | Performed by: ORTHOPAEDIC SURGERY

## 2022-12-08 PROCEDURE — 20610 LARGE JOINT ASPIRATION/INJECTION: R SUBACROMIAL BURSA: ICD-10-PCS | Mod: RT,S$GLB,, | Performed by: ORTHOPAEDIC SURGERY

## 2022-12-08 PROCEDURE — 99213 OFFICE O/P EST LOW 20 MIN: CPT | Mod: 25,S$GLB,, | Performed by: ORTHOPAEDIC SURGERY

## 2022-12-08 PROCEDURE — 3008F PR BODY MASS INDEX (BMI) DOCUMENTED: ICD-10-PCS | Mod: S$GLB,,, | Performed by: ORTHOPAEDIC SURGERY

## 2022-12-08 PROCEDURE — 1159F MED LIST DOCD IN RCRD: CPT | Mod: S$GLB,,, | Performed by: ORTHOPAEDIC SURGERY

## 2022-12-08 PROCEDURE — 1160F RVW MEDS BY RX/DR IN RCRD: CPT | Mod: S$GLB,,, | Performed by: ORTHOPAEDIC SURGERY

## 2022-12-08 RX ORDER — TRIAMCINOLONE ACETONIDE 40 MG/ML
40 INJECTION, SUSPENSION INTRA-ARTICULAR; INTRAMUSCULAR
Status: DISCONTINUED | OUTPATIENT
Start: 2022-12-08 | End: 2022-12-08 | Stop reason: HOSPADM

## 2022-12-08 RX ADMIN — TRIAMCINOLONE ACETONIDE 40 MG: 40 INJECTION, SUSPENSION INTRA-ARTICULAR; INTRAMUSCULAR at 08:12

## 2022-12-08 NOTE — PROCEDURES
Large Joint Aspiration/Injection: R subacromial bursa    Date/Time: 12/8/2022 8:15 AM  Performed by: Hayes Lim MD  Authorized by: Hayes Lim MD     Consent Done?:  Yes (Verbal)  Indications:  Pain  Site marked: the procedure site was marked    Timeout: prior to procedure the correct patient, procedure, and site was verified    Local anesthetic:  Lidocaine 1% without epinephrine and bupivacaine 0.25% without epinephrine  Anesthetic total (ml):  6      Details:  Needle Size:  22 G  Ultrasonic Guidance for needle placement?: No    Approach:  Posterior  Location:  Shoulder  Site:  R subacromial bursa  Medications:  40 mg triamcinolone acetonide 40 mg/mL  Patient tolerance:  Patient tolerated the procedure well with no immediate complications

## 2022-12-08 NOTE — PROGRESS NOTES
Subjective:      Patient ID: Patrica Donato is a 62 y.o. female.    Chief Complaint: Pain of the Right Shoulder and Follow-up of the Left Knee    Patient is in for follow up on her right shoulder as well as her knee.  Her knee pain is much improved.  She continues to have pain with overhead activities heavy lifting and sleeping on her right shoulder.  Continues to have some neck pain and continued continued to have some pain that radiates into her upper extremity.    Pain    Follow-up  ROS      Objective:    Ortho Exam     Constitutional:   Patient is alert  and oriented in no acute distress  HEENT:  normocephalic atraumatic; PERRL EOMI  Neck:  Supple without adenopathy  Cardiovascular:  Normal rate and rhythm  Pulmonary:  Normal respiratory effort normal chest wall expansion  Abdominal:  Nonprotuberant nondistended  Musculoskeletal:  Patient has a steady nonantalgic gait.  Neck is supple with adequate range of motion  No Lhermitte's or Spurling sign.  Adequate range of motion of the right shoulder with discomfort with full forward flexion and internal rotation.  Continues to have a mildly positive impingement sign.  Negative Yergason's and Speed's testing.  No gross weakness however pain with strength testing against resistance there is no swelling mass or atrophy noted.  There is a normal distal neurologic and vascular examination.  Neurological:  No focal defect; cranial nerves 2-12 grossly intact  Psychiatric/behavioral:  Mood and behavior normal               Assessment:       Encounter Diagnosis   Name Primary?    Right shoulder pain, unspecified chronicity Yes         Plan:       I have discussed medical condition and treatment options with her at length.  After a verbal consent and sterile prep I injected her right shoulder today without complication with a combination of cc of Kenalog and 4 cc of lidocaine.  Labral continue with her medications per her family physician.  Continue with rehab exercises she  continues declines formal therapy.  Follow up with me in 4-6 weeks sooner if any questions or problems.        Past Medical History:   Diagnosis Date    Arthritis     Hypertension     Pain, chronic     Thyroid disease      Past Surgical History:   Procedure Laterality Date    ANKLE SURGERY  2000    APPENDECTOMY  2015    CHOLECYSTECTOMY      COLONOSCOPY N/A 09/24/2020    Procedure: COLONOSCOPY - screening, high risk screening, or diagnostic.  (See H&P);  Surgeon: Ovidio Carrillo MD;  Location: [facility] ENDO;  Service: General;  Laterality: N/A;    EPIDURAL STEROID INJECTION N/A 12/30/2020    Procedure: CAROL Lumbar L5, S1;  Surgeon: Guy Israel MD;  Location: [facility] OR;  Service: Pain Management;  Laterality: N/A;    EPIDURAL STEROID INJECTION N/A 01/21/2021    Procedure: Injection, Steroid, Epidural L5-S1;  Surgeon: Guy Israel MD;  Location: [facility] OR;  Service: Pain Management;  Laterality: N/A;  oral , wants to be first case    EPIDURAL STEROID INJECTION Bilateral 03/11/2021    Procedure: Injection, Steroid, Epidural TFESI L5-S1;  Surgeon: Guy Israel MD;  Location: [facility] OR;  Service: Pain Management;  Laterality: Bilateral;  Oral    EPIDURAL STEROID INJECTION N/A 09/15/2021    Procedure: Injection, Steroid, Epidural; L2 - L3;  Surgeon: Guy Israel MD;  Location: [facility] OR;  Service: Pain Management;  Laterality: N/A;  MAKE 2ND PAIN PATIENT SO SHE CAN GO BACK TO WORK    ESOPHAGOGASTRODUODENOSCOPY N/A 09/24/2020    Procedure: ESOPHAGOGASTRODUODENOSCOPY (EGD);  Surgeon: Ovidio Carrillo MD;  Location: [facility];  Service: General;  Laterality: N/A;    HAND SURGERY  2012    finger     HYSTERECTOMY      INJECTION OF JOINT Right 06/10/2021    Procedure: Injection, Joint,Sacroiliac;  Surgeon: Guy Israel MD;  Location: [facility] OR;  Service: Pain Management;  Laterality: Right;  Please schedule patient first so she can go back to work. Podiatry     INJECTION OF STEROID Right 04/29/2021    Procedure:  INJECTION, STEROID, SACROILIAC JOINT;  Surgeon: Guy Israel MD;  Location: [facility] OR;  Service: Pain Management;  Laterality: Right;  ORAL    KNEE ARTHROSCOPY  2011    LAPAROSCOPIC CHOLECYSTECTOMY N/A 10/02/2018    Procedure: CHOLECYSTECTOMY-LAPAROSCOPIC;  Surgeon: Ovidio Carrillo MD;  Location: [facility] OR;  Service: General;  Laterality: N/A;    OOPHORECTOMY      TONSILLECTOMY  1965         Current Outpatient Medications:     amLODIPine (NORVASC) 5 MG tablet, Take 1 tablet (5 mg total) by mouth once daily., Disp: 90 tablet, Rfl: 3    celecoxib (CELEBREX) 200 MG capsule, Take 1 capsule (200 mg total) by mouth 2 (two) times daily., Disp: 180 capsule, Rfl: 0    esomeprazole (NEXIUM) 20 MG capsule, Take 1 capsule (20 mg total) by mouth before breakfast., Disp: 90 capsule, Rfl: 3    gabapentin (NEURONTIN) 600 MG tablet, Take 1 tablet (600 mg total) by mouth 3 (three) times daily., Disp: 270 tablet, Rfl: 0    HYDROcodone-acetaminophen (NORCO)  mg per tablet, Take 1 tablet by mouth every 6 (six) hours as needed for Pain., Disp: 120 tablet, Rfl: 0    HYDROcodone-acetaminophen (NORCO)  mg per tablet, Take 1 tablet by mouth every 6 (six) hours as needed for Pain., Disp: 120 tablet, Rfl: 0    [START ON 1/3/2023] HYDROcodone-acetaminophen (NORCO)  mg per tablet, Take 1 tablet by mouth every 6 (six) hours as needed for Pain., Disp: 120 tablet, Rfl: 0    levothyroxine (SYNTHROID) 125 MCG tablet, Take 1 tablet (125 mcg total) by mouth before breakfast., Disp: 90 tablet, Rfl: 3    metoprolol succinate (TOPROL-XL) 100 MG 24 hr tablet, Take 1 tablet (100 mg total) by mouth once daily., Disp: 90 tablet, Rfl: 3    pantoprazole (PROTONIX) 40 MG tablet, Take 1 tablet (40 mg total) by mouth once daily., Disp: 90 tablet, Rfl: 1    pravastatin (PRAVACHOL) 20 MG tablet, Take 1 tablet (20 mg total) by mouth every evening., Disp: 90 tablet, Rfl: 3    Review of patient's allergies indicates:   Allergen Reactions    Cerave  [ceramides 1,3,6-ii] Rash       Family History   Problem Relation Age of Onset    Dementia Mother     Cancer Father     Breast cancer Maternal Grandmother     Kidney disease Maternal Grandmother      Social History     Occupational History    Not on file   Tobacco Use    Smoking status: Former     Types: Cigarettes     Quit date: 2015     Years since quittin.9    Smokeless tobacco: Never   Substance and Sexual Activity    Alcohol use: Yes     Alcohol/week: 2.0 standard drinks     Types: 2 Glasses of wine per week    Drug use: Never    Sexual activity: Yes     Partners: Male     Birth control/protection: See Surgical Hx

## 2022-12-19 ENCOUNTER — TELEPHONE (OUTPATIENT)
Dept: FAMILY MEDICINE | Facility: CLINIC | Age: 62
End: 2022-12-19
Payer: COMMERCIAL

## 2022-12-19 DIAGNOSIS — E78.5 HYPERLIPIDEMIA, UNSPECIFIED HYPERLIPIDEMIA TYPE: ICD-10-CM

## 2022-12-19 DIAGNOSIS — R00.0 TACHYCARDIA: ICD-10-CM

## 2022-12-19 DIAGNOSIS — I10 ESSENTIAL HYPERTENSION: ICD-10-CM

## 2022-12-19 RX ORDER — METOPROLOL SUCCINATE 100 MG/1
100 TABLET, EXTENDED RELEASE ORAL DAILY
Qty: 90 TABLET | Refills: 3 | Status: SHIPPED | OUTPATIENT
Start: 2022-12-19 | End: 2023-05-31 | Stop reason: SDUPTHER

## 2022-12-19 RX ORDER — PRAVASTATIN SODIUM 20 MG/1
20 TABLET ORAL NIGHTLY
Qty: 90 TABLET | Refills: 3 | Status: SHIPPED | OUTPATIENT
Start: 2022-12-19 | End: 2023-05-31 | Stop reason: SDUPTHER

## 2022-12-19 RX ORDER — AMLODIPINE BESYLATE 5 MG/1
5 TABLET ORAL DAILY
Qty: 90 TABLET | Refills: 3 | Status: SHIPPED | OUTPATIENT
Start: 2022-12-19 | End: 2023-05-31 | Stop reason: SDUPTHER

## 2023-01-17 ENCOUNTER — OFFICE VISIT (OUTPATIENT)
Dept: PAIN MEDICINE | Facility: CLINIC | Age: 63
End: 2023-01-17
Payer: COMMERCIAL

## 2023-01-17 DIAGNOSIS — Z79.891 CHRONIC USE OF OPIATE FOR THERAPEUTIC PURPOSE: ICD-10-CM

## 2023-01-17 DIAGNOSIS — M54.16 LUMBAR RADICULOPATHY: ICD-10-CM

## 2023-01-17 DIAGNOSIS — M48.07 SPINAL STENOSIS, LUMBOSACRAL REGION: ICD-10-CM

## 2023-01-17 DIAGNOSIS — G89.4 CHRONIC PAIN SYNDROME: Primary | ICD-10-CM

## 2023-01-17 DIAGNOSIS — M51.36 DDD (DEGENERATIVE DISC DISEASE), LUMBAR: Primary | ICD-10-CM

## 2023-01-17 PROCEDURE — 1160F RVW MEDS BY RX/DR IN RCRD: CPT | Mod: S$GLB,,,

## 2023-01-17 PROCEDURE — 1159F MED LIST DOCD IN RCRD: CPT | Mod: S$GLB,,,

## 2023-01-17 PROCEDURE — 80326 AMPHETAMINES 5 OR MORE: CPT

## 2023-01-17 PROCEDURE — 1159F PR MEDICATION LIST DOCUMENTED IN MEDICAL RECORD: ICD-10-PCS | Mod: S$GLB,,,

## 2023-01-17 PROCEDURE — 99214 OFFICE O/P EST MOD 30 MIN: CPT | Mod: S$GLB,,,

## 2023-01-17 PROCEDURE — 99999 PR PBB SHADOW E&M-EST. PATIENT-LVL III: CPT | Mod: PBBFAC,,,

## 2023-01-17 PROCEDURE — 99999 PR PBB SHADOW E&M-EST. PATIENT-LVL III: ICD-10-PCS | Mod: PBBFAC,,,

## 2023-01-17 PROCEDURE — 99214 PR OFFICE/OUTPT VISIT, EST, LEVL IV, 30-39 MIN: ICD-10-PCS | Mod: S$GLB,,,

## 2023-01-17 PROCEDURE — 1160F PR REVIEW ALL MEDS BY PRESCRIBER/CLIN PHARMACIST DOCUMENTED: ICD-10-PCS | Mod: S$GLB,,,

## 2023-01-17 RX ORDER — HYDROCODONE BITARTRATE AND ACETAMINOPHEN 10; 325 MG/1; MG/1
1 TABLET ORAL EVERY 6 HOURS PRN
Qty: 120 TABLET | Refills: 0 | Status: SHIPPED | OUTPATIENT
Start: 2023-03-08 | End: 2023-04-06 | Stop reason: SDUPTHER

## 2023-01-17 RX ORDER — HYDROCODONE BITARTRATE AND ACETAMINOPHEN 10; 325 MG/1; MG/1
1 TABLET ORAL EVERY 6 HOURS PRN
Qty: 120 TABLET | Refills: 0 | Status: SHIPPED | OUTPATIENT
Start: 2023-04-05 | End: 2023-04-06 | Stop reason: SDUPTHER

## 2023-01-17 RX ORDER — GABAPENTIN 600 MG/1
600 TABLET ORAL 3 TIMES DAILY
Qty: 270 TABLET | Refills: 3 | Status: SHIPPED | OUTPATIENT
Start: 2023-01-17 | End: 2024-02-26 | Stop reason: SDUPTHER

## 2023-01-17 RX ORDER — HYDROCODONE BITARTRATE AND ACETAMINOPHEN 10; 325 MG/1; MG/1
1 TABLET ORAL EVERY 6 HOURS PRN
Qty: 120 TABLET | Refills: 0 | Status: SHIPPED | OUTPATIENT
Start: 2023-02-08 | End: 2023-04-06 | Stop reason: SDUPTHER

## 2023-01-17 NOTE — PROGRESS NOTES
FOLLOW UP NOTE:     CHIEF COMPLAINT: right buttock/hip area    INTERVAL HISTORY OF PRESENT ILLNESS: Patrica Donato is a 62 y.o. female with PMH significant for HTN, hypothyroidism, and hx of right ankle surgery X 3 presents as an established patient  for the continued management of right buttock/hip pain. The patient presents today for medication refill. Today, the patient continues to localize her pain to the right side of her lower back/hip/buttock area. She reports of intermittent radiation down her RLE to her knee and ankle. She reports of benefit with Norco  mg PO TID/QID PRN. She states her pain medications allow her to function. The patient reports that her pain is worsened with activities such as vacuuming. The patient reports her pain is worse at night and when sitting in car for long periods of time. The patient is s/p left knee injection and right shoulder injection with Dr. Lim. The patient denies of any significant changes in her health since her last appointment. The patient also denies of any changes in the character of her pain since her last appointment.  The patient reports she completed PT without any benefit. The patient reports that her current pain is a 5/10. The patient reports that her pain can increase to a 10/10 at its worst. Patient denies of any urinary/fecal incontinence, saddle anesthesia, or weakness.      Of note, the patient continues to participate in home stretches and exercises as instructed by her chiropractor.     INITIAL HISTORY OF PRESENT ILLNESS: Patrica Donato is a 60 y.o. female with PMH significant for HTN, hypothyroidism, and hx of right ankle surgery X 3 presents as a referral for the evaluation of right buttock pain. The patient reports that her pain began approximately October 2020 after no inciting incident or trauma. The patient denies of experiencing this pain in the past. The patient localizes her pain to right buttock. The patient reports of  radiation down the posterior aspect of her RLE to her ankle. The patient describes her pain as an aching type of pain. The patient denies of numbness. The patient reports that her pain is a 5/10. Patient denies of any urinary/fecal incontinence, saddle anesthesia, or weakness.      The patient reports that she has had difficulty with sleeping secondary to her pain. She reports that she believes she has piriformis syndrome.      Aggravating factors: movement, prolonged sitting     Mitigating factors: ice, laying down         INTERVENTIONAL PAIN HISTORY:  9/15/2021: Lumbar interlaminar epidural steroid injection at L2-L3 under fluoroscopic guidance (right paramedian approach) - no relief  6/10/2021: Right SI joint injection (Dr. Israel) - 60% benefit x 3 weeks.  4/29/2021: Right SI joint injection (Dr. Israel) - 40% benefit X 3 weeks  3/11/2021: Bilateral L5 - S1 transforaminal CAROL () - minimal benefit  1/21/2021: L5 - S1 CAROL with right paramedian approach (Dr. Israel) - minimal benefit  12/30/2020: L5 - S1 interlaminar CAROL (Dr. Israel) - 60% relief for about 5 days then slowly increasing pain.     12/13/2019: Right knee 40 mg triamcinolone acetonide 40 mg/mL via Dr. Russ  5/3/2017: Left knee 40 mg triamcinolone acetonide 40 mg/mL via Dr. Pablo  4/5/2017: Left knee 40 mg triamcinolone acetonide 40 mg/mL via Dr. Pablo     CURRENT PAIN MEDICATIONS:   Tylenol OTC as needed  Celebrex 200 mg PO BID  Norco  mg PO BID/TID  Gabapentin 600 mg PO TID        ROS:  Review of Systems   Constitutional: Negative for chills and fever.   HENT: Negative for sore throat.    Eyes: Negative for visual disturbance.   Respiratory: Negative for shortness of breath.    Cardiovascular: Negative for chest pain.   Gastrointestinal: Negative for nausea and vomiting.   Genitourinary: Negative for difficulty urinating.   Musculoskeletal: Positive for arthralgias, back pain and gait problem.   Skin: Negative for rash.    Allergic/Immunologic: Negative for immunocompromised state.   Neurological: Negative for syncope.   Hematological: Does not bruise/bleed easily.   Psychiatric/Behavioral: Negative for suicidal ideas.   All other systems reviewed and are negative.       MEDICAL, SURGICAL, FAMILY, SOCIAL HX: reviewed    MEDICATIONS/ALLERGIES: reviewed    PHYSICAL EXAM:    VITALS: Vitals reviewed.   There were no vitals filed for this visit.    Physical Exam  Vitals and nursing note reviewed.   Constitutional:       Appearance: She is not diaphoretic.   HENT:      Head: Normocephalic and atraumatic.   Eyes:      General:         Right eye: No discharge.         Left eye: No discharge.      Conjunctiva/sclera: Conjunctivae normal.   Cardiovascular:      Rate and Rhythm: Normal rate.   Pulmonary:      Effort: Pulmonary effort is normal. No respiratory distress.      Breath sounds: Normal breath sounds.   Abdominal:      Palpations: Abdomen is soft.   Skin:     General: Skin is warm and dry.      Findings: No rash.   Neurological:      Mental Status: She is alert and oriented to person, place, and time.   Psychiatric:         Mood and Affect: Mood and affect normal.         Cognition and Memory: Memory normal.         Judgment: Judgment normal.          UPPER EXTREMITIES: Normal alignment, normal range of motion, no atrophy, no skin changes,  hair growth and nail growth normal and equal bilaterally. No swelling, no tenderness.    LOWER EXTREMITIES:  Normal alignment, normal range of motion, no atrophy, no skin changes,  hair growth and nail growth normal and equal bilaterally. No swelling, no tenderness.     LUMBAR SPINE  Lumbar spine: ROM is full with flexion extension and oblique extension with no increased pain.     ((+)) Supine straight leg raise on the right   ((--)) Facet loading   Internal and external rotation of the hip causes increased pain on the right side. TTP at the site of the left greater trochanter.   Myofascial exam:  No tenderness to palpation across lumbar paraspinous muscles.     MOTOR: Tone and bulk: normal bilateral upper and lower Strength: normal   Delt      Bi         Tri        WE      WF                        R          5          5          5          5          5          5            L          5          5          5          5          5          5               IP         ADD     ABD     Quad   TA        Gas      HAM  R          5          5          5          5          5          5          5  L          5          5          5          5          5          5          5     SENSATION: Light touch and pinprick intact bilaterally  REFLEXES: normal, symmetric, nonbrisk.  Toes down, no clonus. Negative forrest's sign bilaterally.  GAIT: normal rise, base, steps, and arm swing.      IMAGING: no new imaging to review    ASSESSMENT: Patrica Donato is a 61 y.o. female with PMH significant for HTN, hypothyroidism, and hx of right ankle surgery X 3 presents as an established patient for the continued management of right buttock/hip pain.  The patient presents today for a medication refill. Treatment plan outlined below.   Encounter Diagnoses   Name Primary?    Chronic use of opiate for therapeutic purpose     DDD (degenerative disc disease), lumbar Yes    Lumbar radiculopathy     Spinal stenosis, lumbosacral region          PLAN:  - I have stressed the importance of physical activity and a home exercise plan to help with chronic pain and improve health.  - Refilled Norco  mg PO q 6 hr PRN for breakthrough pain; # 120 tablets; 2 refills.  reviewed without discrepancies.   - UDS collected today, last took pain medication this morning.   - Refilled Gabapentin   - Discussed MBB/RFA, informed patient that documented PT trialed is needed in order to receive insurance approval.   - RTC in 3 months for follow-up, medication refill, and repeat UDS  All medication management performed by Dr. Keene.   Katerin GIVENS  Brisa, NP

## 2023-01-23 LAB
6MAM UR QL: NOT DETECTED
7AMINOCLONAZEPAM UR QL: NOT DETECTED
A-OH ALPRAZ UR QL: NOT DETECTED
ALPHA-OH-MIDAZOLAM: NOT DETECTED
ALPRAZ UR QL: NOT DETECTED
AMPHET UR QL SCN: NOT DETECTED
ANNOTATION COMMENT IMP: ABNORMAL
ANNOTATION COMMENT IMP: ABNORMAL
BARBITURATES UR QL: NOT DETECTED
BUPRENORPHINE UR QL: NOT DETECTED
BZE UR QL: NOT DETECTED
CARBOXYTHC UR QL: NOT DETECTED
CARISOPRODOL UR QL: NOT DETECTED
CLONAZEPAM UR QL: NOT DETECTED
CODEINE UR QL: NOT DETECTED
CREAT UR-MCNC: >400 MG/DL (ref 20–400)
DIAZEPAM UR QL: NOT DETECTED
ETHYL GLUCURONIDE UR QL: NOT DETECTED
FENTANYL UR QL: NOT DETECTED
GABAPENTIN: PRESENT
HYDROCODONE UR QL: PRESENT
HYDROMORPHONE UR QL: PRESENT
LORAZEPAM UR QL: NOT DETECTED
MDA UR QL: NOT DETECTED
MDEA UR QL: NOT DETECTED
MDMA UR QL: NOT DETECTED
ME-PHENIDATE UR QL: NOT DETECTED
METHADONE UR QL: NOT DETECTED
METHAMPHET UR QL: NOT DETECTED
MIDAZOLAM UR QL SCN: NOT DETECTED
MORPHINE UR QL: NOT DETECTED
NALOXONE: NOT DETECTED
NORBUPRENORPHINE UR QL CFM: NOT DETECTED
NORDIAZEPAM UR QL: NOT DETECTED
NORFENTANYL UR QL: NOT DETECTED
NORHYDROCODONE UR QL CFM: PRESENT
NORMEPERIDINE UR QL CFM: NOT DETECTED
NOROXYCODONE UR QL CFM: NOT DETECTED
NOROXYMORPHONE UR QL SCN: NOT DETECTED
OXAZEPAM UR QL: NOT DETECTED
OXYCODONE UR QL: NOT DETECTED
OXYMORPHONE UR QL: NOT DETECTED
PATHOLOGY STUDY: ABNORMAL
PCP UR QL: NOT DETECTED
PHENTERMINE UR QL: NOT DETECTED
PREGABALIN: NOT DETECTED
SERVICE CMNT-IMP: ABNORMAL
TAPENTADOL UR QL SCN: NOT DETECTED
TAPENTADOL UR QL SCN: NOT DETECTED
TEMAZEPAM UR QL: NOT DETECTED
TRAMADOL UR QL: NOT DETECTED
ZOLPIDEM METABOLITE: NOT DETECTED
ZOLPIDEM UR QL: NOT DETECTED

## 2023-04-06 ENCOUNTER — OFFICE VISIT (OUTPATIENT)
Dept: PAIN MEDICINE | Facility: CLINIC | Age: 63
End: 2023-04-06
Payer: COMMERCIAL

## 2023-04-06 DIAGNOSIS — G89.4 CHRONIC PAIN SYNDROME: ICD-10-CM

## 2023-04-06 DIAGNOSIS — M51.36 DDD (DEGENERATIVE DISC DISEASE), LUMBAR: ICD-10-CM

## 2023-04-06 DIAGNOSIS — M46.1 SACROILIITIS: ICD-10-CM

## 2023-04-06 DIAGNOSIS — Z79.891 CHRONIC USE OF OPIATE FOR THERAPEUTIC PURPOSE: Primary | ICD-10-CM

## 2023-04-06 DIAGNOSIS — M47.818 ARTHROPATHY OF RIGHT SACROILIAC JOINT: ICD-10-CM

## 2023-04-06 PROCEDURE — 1159F MED LIST DOCD IN RCRD: CPT | Mod: S$GLB,,,

## 2023-04-06 PROCEDURE — 1159F PR MEDICATION LIST DOCUMENTED IN MEDICAL RECORD: ICD-10-PCS | Mod: S$GLB,,,

## 2023-04-06 PROCEDURE — 99999 PR PBB SHADOW E&M-EST. PATIENT-LVL III: ICD-10-PCS | Mod: PBBFAC,,,

## 2023-04-06 PROCEDURE — 1160F RVW MEDS BY RX/DR IN RCRD: CPT | Mod: S$GLB,,,

## 2023-04-06 PROCEDURE — 1160F PR REVIEW ALL MEDS BY PRESCRIBER/CLIN PHARMACIST DOCUMENTED: ICD-10-PCS | Mod: S$GLB,,,

## 2023-04-06 PROCEDURE — 99214 OFFICE O/P EST MOD 30 MIN: CPT | Mod: S$GLB,,,

## 2023-04-06 PROCEDURE — 99999 PR PBB SHADOW E&M-EST. PATIENT-LVL III: CPT | Mod: PBBFAC,,,

## 2023-04-06 PROCEDURE — 80326 AMPHETAMINES 5 OR MORE: CPT

## 2023-04-06 PROCEDURE — 99214 PR OFFICE/OUTPT VISIT, EST, LEVL IV, 30-39 MIN: ICD-10-PCS | Mod: S$GLB,,,

## 2023-04-06 RX ORDER — HYDROCODONE BITARTRATE AND ACETAMINOPHEN 10; 325 MG/1; MG/1
1 TABLET ORAL EVERY 6 HOURS PRN
Qty: 120 TABLET | Refills: 0 | Status: SHIPPED | OUTPATIENT
Start: 2023-06-29 | End: 2023-08-07 | Stop reason: ALTCHOICE

## 2023-04-06 RX ORDER — HYDROCODONE BITARTRATE AND ACETAMINOPHEN 10; 325 MG/1; MG/1
1 TABLET ORAL EVERY 6 HOURS PRN
Qty: 120 TABLET | Refills: 0 | Status: SHIPPED | OUTPATIENT
Start: 2023-05-04 | End: 2023-08-07 | Stop reason: ALTCHOICE

## 2023-04-06 RX ORDER — CELECOXIB 200 MG/1
200 CAPSULE ORAL 2 TIMES DAILY
Qty: 60 CAPSULE | Refills: 2 | Status: SHIPPED | OUTPATIENT
Start: 2023-04-06 | End: 2023-08-07 | Stop reason: SDUPTHER

## 2023-04-06 RX ORDER — HYDROCODONE BITARTRATE AND ACETAMINOPHEN 10; 325 MG/1; MG/1
1 TABLET ORAL EVERY 6 HOURS PRN
Qty: 120 TABLET | Refills: 0 | Status: SHIPPED | OUTPATIENT
Start: 2023-06-01 | End: 2023-08-07 | Stop reason: ALTCHOICE

## 2023-04-06 NOTE — PROGRESS NOTES
FOLLOW UP NOTE:     CHIEF COMPLAINT: right buttock/hip area    INTERVAL HISTORY OF PRESENT ILLNESS: Patrica Donato is a 62 y.o. female with PMH significant for HTN, hypothyroidism, and hx of right ankle surgery X 3 presents as an established patient  for the continued management of right buttock/hip pain. The patient presents today for medication refill. Today, the patient continues to localize her pain to the right side of her lower back/hip/buttock area. She reports of intermittent radiation down her RLE to her knee and ankle. She reports of benefit with Norco  mg PO TID/QID PRN. She states her pain medications allow her to function. The patient reports that her pain is worsened with activities such as vacuuming. The patient reports her pain is worse at night and when sitting in car for long periods of time. The patient is s/p left knee injection and right shoulder injection with Dr. Lim. The patient denies of any significant changes in her health since her last appointment. The patient also denies of any changes in the character of her pain since her last appointment.  The patient reports she completed PT without any benefit. The patient reports that her current pain is a 5/10. The patient reports that her pain can increase to a 10/10 at its worst. Patient denies of any urinary/fecal incontinence, saddle anesthesia, or weakness.      Of note, the patient continues to participate in home stretches and exercises as instructed by her chiropractor.     INITIAL HISTORY OF PRESENT ILLNESS: Patrica Donato is a 60 y.o. female with PMH significant for HTN, hypothyroidism, and hx of right ankle surgery X 3 presents as a referral for the evaluation of right buttock pain. The patient reports that her pain began approximately October 2020 after no inciting incident or trauma. The patient denies of experiencing this pain in the past. The patient localizes her pain to right buttock. The patient reports of  radiation down the posterior aspect of her RLE to her ankle. The patient describes her pain as an aching type of pain. The patient denies of numbness. The patient reports that her pain is a 5/10. Patient denies of any urinary/fecal incontinence, saddle anesthesia, or weakness.      The patient reports that she has had difficulty with sleeping secondary to her pain. She reports that she believes she has piriformis syndrome.      Aggravating factors: movement, prolonged sitting     Mitigating factors: ice, laying down         INTERVENTIONAL PAIN HISTORY:  9/15/2021: Lumbar interlaminar epidural steroid injection at L2-L3 under fluoroscopic guidance (right paramedian approach) - no relief  6/10/2021: Right SI joint injection (Dr. Israel) - 60% benefit x 3 weeks.  4/29/2021: Right SI joint injection (Dr. Israel) - 40% benefit X 3 weeks  3/11/2021: Bilateral L5 - S1 transforaminal CAROL () - minimal benefit  1/21/2021: L5 - S1 CAROL with right paramedian approach (Dr. Israel) - minimal benefit  12/30/2020: L5 - S1 interlaminar CAROL (Dr. Israel) - 60% relief for about 5 days then slowly increasing pain.     12/13/2019: Right knee 40 mg triamcinolone acetonide 40 mg/mL via Dr. Russ  5/3/2017: Left knee 40 mg triamcinolone acetonide 40 mg/mL via Dr. Pablo  4/5/2017: Left knee 40 mg triamcinolone acetonide 40 mg/mL via Dr. Pablo     CURRENT PAIN MEDICATIONS:   Tylenol OTC as needed  Celebrex 200 mg PO BID  Norco  mg PO BID/TID  Gabapentin 600 mg PO TID        ROS:  Review of Systems   Constitutional: Negative for chills and fever.   HENT: Negative for sore throat.    Eyes: Negative for visual disturbance.   Respiratory: Negative for shortness of breath.    Cardiovascular: Negative for chest pain.   Gastrointestinal: Negative for nausea and vomiting.   Genitourinary: Negative for difficulty urinating.   Musculoskeletal: Positive for arthralgias, back pain and gait problem.   Skin: Negative for rash.    Allergic/Immunologic: Negative for immunocompromised state.   Neurological: Negative for syncope.   Hematological: Does not bruise/bleed easily.   Psychiatric/Behavioral: Negative for suicidal ideas.   All other systems reviewed and are negative.       MEDICAL, SURGICAL, FAMILY, SOCIAL HX: reviewed    MEDICATIONS/ALLERGIES: reviewed    PHYSICAL EXAM:    VITALS: Vitals reviewed.   There were no vitals filed for this visit.    Physical Exam  Vitals and nursing note reviewed.   Constitutional:       Appearance: She is not diaphoretic.   HENT:      Head: Normocephalic and atraumatic.   Eyes:      General:         Right eye: No discharge.         Left eye: No discharge.      Conjunctiva/sclera: Conjunctivae normal.   Cardiovascular:      Rate and Rhythm: Normal rate.   Pulmonary:      Effort: Pulmonary effort is normal. No respiratory distress.      Breath sounds: Normal breath sounds.   Abdominal:      Palpations: Abdomen is soft.   Skin:     General: Skin is warm and dry.      Findings: No rash.   Neurological:      Mental Status: She is alert and oriented to person, place, and time.   Psychiatric:         Mood and Affect: Mood and affect normal.         Cognition and Memory: Memory normal.         Judgment: Judgment normal.          UPPER EXTREMITIES: Normal alignment, normal range of motion, no atrophy, no skin changes,  hair growth and nail growth normal and equal bilaterally. No swelling, no tenderness.    LOWER EXTREMITIES:  Normal alignment, normal range of motion, no atrophy, no skin changes,  hair growth and nail growth normal and equal bilaterally. No swelling, no tenderness.     LUMBAR SPINE  Lumbar spine: ROM is full with flexion extension and oblique extension with no increased pain.     ((+)) Supine straight leg raise on the right   ((--)) Facet loading   Internal and external rotation of the hip causes increased pain on the right side. TTP at the site of the left greater trochanter.   Myofascial exam:  No tenderness to palpation across lumbar paraspinous muscles.     MOTOR: Tone and bulk: normal bilateral upper and lower Strength: normal   Delt      Bi         Tri        WE      WF                        R          5          5          5          5          5          5            L          5          5          5          5          5          5               IP         ADD     ABD     Quad   TA        Gas      HAM  R          5          5          5          5          5          5          5  L          5          5          5          5          5          5          5     SENSATION: Light touch and pinprick intact bilaterally  REFLEXES: normal, symmetric, nonbrisk.  Toes down, no clonus. Negative forrest's sign bilaterally.  GAIT: normal rise, base, steps, and arm swing.      IMAGING: no new imaging to review    ASSESSMENT: Patrica Donato is a 61 y.o. female with PMH significant for HTN, hypothyroidism, and hx of right ankle surgery X 3 presents as an established patient for the continued management of right buttock/hip pain.  The patient presents today for a medication refill. Treatment plan outlined below.   Encounter Diagnosis   Name Primary?    Chronic use of opiate for therapeutic purpose Yes         PLAN:  - I have stressed the importance of physical activity and a home exercise plan to help with chronic pain and improve health.  - Refilled Norco  mg PO q 6 hr PRN for breakthrough pain; # 120 tablets; 2 refills.  reviewed without discrepancies.   - UDS collected today, last took pain medication this morning.   - Refilled Gabapentin   - refilled Celebrex   - Discussed hip injection under xray, deferred at this time.   - RTC in 3 months for follow-up, medication refill, and repeat UDS  All medication management performed by Dr. Keene.   Katerin Ledezma, YVROSE

## 2023-04-12 LAB
6MAM UR QL: NOT DETECTED
7AMINOCLONAZEPAM UR QL: NOT DETECTED
A-OH ALPRAZ UR QL: NOT DETECTED
ALPHA-OH-MIDAZOLAM: NOT DETECTED
ALPRAZ UR QL: NOT DETECTED
AMPHET UR QL SCN: NOT DETECTED
ANNOTATION COMMENT IMP: NORMAL
ANNOTATION COMMENT IMP: NORMAL
BARBITURATES UR QL: NOT DETECTED
BUPRENORPHINE UR QL: NOT DETECTED
BZE UR QL: NOT DETECTED
CARBOXYTHC UR QL: NOT DETECTED
CARISOPRODOL UR QL: NOT DETECTED
CLONAZEPAM UR QL: NOT DETECTED
CODEINE UR QL: NOT DETECTED
CREAT UR-MCNC: 62.7 MG/DL (ref 20–400)
DIAZEPAM UR QL: NOT DETECTED
ETHYL GLUCURONIDE UR QL: PRESENT
FENTANYL UR QL: NOT DETECTED
GABAPENTIN: PRESENT
HYDROCODONE UR QL: PRESENT
HYDROMORPHONE UR QL: PRESENT
LORAZEPAM UR QL: NOT DETECTED
MDA UR QL: NOT DETECTED
MDEA UR QL: NOT DETECTED
MDMA UR QL: NOT DETECTED
ME-PHENIDATE UR QL: NOT DETECTED
METHADONE UR QL: NOT DETECTED
METHAMPHET UR QL: NOT DETECTED
MIDAZOLAM UR QL SCN: NOT DETECTED
MORPHINE UR QL: NOT DETECTED
NALOXONE: NOT DETECTED
NORBUPRENORPHINE UR QL CFM: NOT DETECTED
NORDIAZEPAM UR QL: NOT DETECTED
NORFENTANYL UR QL: NOT DETECTED
NORHYDROCODONE UR QL CFM: PRESENT
NORMEPERIDINE UR QL CFM: NOT DETECTED
NOROXYCODONE UR QL CFM: NOT DETECTED
NOROXYMORPHONE UR QL SCN: NOT DETECTED
OXAZEPAM UR QL: NOT DETECTED
OXYCODONE UR QL: NOT DETECTED
OXYMORPHONE UR QL: NOT DETECTED
PATHOLOGY STUDY: NORMAL
PCP UR QL: NOT DETECTED
PHENTERMINE UR QL: NOT DETECTED
PREGABALIN: NOT DETECTED
SERVICE CMNT-IMP: NORMAL
TAPENTADOL UR QL SCN: NOT DETECTED
TAPENTADOL UR QL SCN: NOT DETECTED
TEMAZEPAM UR QL: NOT DETECTED
TRAMADOL UR QL: NOT DETECTED
ZOLPIDEM METABOLITE: NOT DETECTED
ZOLPIDEM UR QL: NOT DETECTED

## 2023-05-31 ENCOUNTER — TELEPHONE (OUTPATIENT)
Dept: FAMILY MEDICINE | Facility: CLINIC | Age: 63
End: 2023-05-31
Payer: COMMERCIAL

## 2023-05-31 DIAGNOSIS — R00.0 TACHYCARDIA: ICD-10-CM

## 2023-05-31 DIAGNOSIS — E03.9 HYPOTHYROIDISM, UNSPECIFIED TYPE: ICD-10-CM

## 2023-05-31 DIAGNOSIS — E78.5 HYPERLIPIDEMIA, UNSPECIFIED HYPERLIPIDEMIA TYPE: ICD-10-CM

## 2023-05-31 DIAGNOSIS — I10 ESSENTIAL HYPERTENSION: ICD-10-CM

## 2023-05-31 RX ORDER — METOPROLOL SUCCINATE 100 MG/1
100 TABLET, EXTENDED RELEASE ORAL DAILY
Qty: 90 TABLET | Refills: 3 | Status: SHIPPED | OUTPATIENT
Start: 2023-05-31

## 2023-05-31 RX ORDER — PRAVASTATIN SODIUM 20 MG/1
20 TABLET ORAL NIGHTLY
Qty: 90 TABLET | Refills: 3 | Status: SHIPPED | OUTPATIENT
Start: 2023-05-31

## 2023-05-31 RX ORDER — AMLODIPINE BESYLATE 5 MG/1
5 TABLET ORAL DAILY
Qty: 90 TABLET | Refills: 3 | Status: SHIPPED | OUTPATIENT
Start: 2023-05-31

## 2023-05-31 RX ORDER — LEVOTHYROXINE SODIUM 125 UG/1
125 TABLET ORAL
Qty: 90 TABLET | Refills: 3 | Status: SHIPPED | OUTPATIENT
Start: 2023-05-31

## 2023-06-15 DIAGNOSIS — K29.50 CHRONIC GASTRITIS WITHOUT BLEEDING, UNSPECIFIED GASTRITIS TYPE: ICD-10-CM

## 2023-06-16 RX ORDER — PANTOPRAZOLE SODIUM 40 MG/1
40 TABLET, DELAYED RELEASE ORAL DAILY
Qty: 90 TABLET | Refills: 3 | Status: SHIPPED | OUTPATIENT
Start: 2023-06-16 | End: 2024-06-10

## 2023-08-07 ENCOUNTER — OFFICE VISIT (OUTPATIENT)
Dept: PAIN MEDICINE | Facility: CLINIC | Age: 63
End: 2023-08-07
Payer: COMMERCIAL

## 2023-08-07 VITALS
OXYGEN SATURATION: 98 % | WEIGHT: 174 LBS | SYSTOLIC BLOOD PRESSURE: 137 MMHG | HEIGHT: 66 IN | DIASTOLIC BLOOD PRESSURE: 75 MMHG | HEART RATE: 111 BPM | BODY MASS INDEX: 27.97 KG/M2

## 2023-08-07 DIAGNOSIS — M48.07 SPINAL STENOSIS, LUMBOSACRAL REGION: ICD-10-CM

## 2023-08-07 DIAGNOSIS — M51.36 DDD (DEGENERATIVE DISC DISEASE), LUMBAR: Primary | ICD-10-CM

## 2023-08-07 DIAGNOSIS — G89.4 CHRONIC PAIN SYNDROME: ICD-10-CM

## 2023-08-07 DIAGNOSIS — Z79.891 CHRONIC USE OF OPIATE FOR THERAPEUTIC PURPOSE: ICD-10-CM

## 2023-08-07 DIAGNOSIS — M47.818 ARTHROPATHY OF RIGHT SACROILIAC JOINT: ICD-10-CM

## 2023-08-07 DIAGNOSIS — M46.1 SACROILIITIS: ICD-10-CM

## 2023-08-07 PROCEDURE — 99214 PR OFFICE/OUTPT VISIT, EST, LEVL IV, 30-39 MIN: ICD-10-PCS | Mod: S$GLB,,,

## 2023-08-07 PROCEDURE — 80326 AMPHETAMINES 5 OR MORE: CPT

## 2023-08-07 PROCEDURE — 99999 PR PBB SHADOW E&M-EST. PATIENT-LVL III: CPT | Mod: PBBFAC,,,

## 2023-08-07 PROCEDURE — 99214 OFFICE O/P EST MOD 30 MIN: CPT | Mod: S$GLB,,,

## 2023-08-07 PROCEDURE — 99213 OFFICE O/P EST LOW 20 MIN: CPT | Mod: PBBFAC,PO

## 2023-08-07 PROCEDURE — 99999 PR PBB SHADOW E&M-EST. PATIENT-LVL III: ICD-10-PCS | Mod: PBBFAC,,,

## 2023-08-07 RX ORDER — HYDROCODONE BITARTRATE AND ACETAMINOPHEN 10; 325 MG/1; MG/1
1 TABLET ORAL EVERY 6 HOURS PRN
Qty: 120 TABLET | Refills: 0 | Status: SHIPPED | OUTPATIENT
Start: 2023-09-04 | End: 2023-11-06 | Stop reason: SDUPTHER

## 2023-08-07 RX ORDER — CELECOXIB 200 MG/1
200 CAPSULE ORAL 2 TIMES DAILY
Qty: 60 CAPSULE | Refills: 2 | Status: SHIPPED | OUTPATIENT
Start: 2023-08-07 | End: 2023-11-06 | Stop reason: SDUPTHER

## 2023-08-07 RX ORDER — HYDROCODONE BITARTRATE AND ACETAMINOPHEN 10; 325 MG/1; MG/1
1 TABLET ORAL EVERY 6 HOURS PRN
Qty: 120 TABLET | Refills: 0 | Status: SHIPPED | OUTPATIENT
Start: 2023-08-07 | End: 2023-11-06 | Stop reason: SDUPTHER

## 2023-08-07 RX ORDER — HYDROCODONE BITARTRATE AND ACETAMINOPHEN 10; 325 MG/1; MG/1
1 TABLET ORAL EVERY 6 HOURS PRN
Qty: 120 TABLET | Refills: 0 | Status: SHIPPED | OUTPATIENT
Start: 2023-10-02 | End: 2023-11-06 | Stop reason: SDUPTHER

## 2023-08-07 NOTE — PROGRESS NOTES
FOLLOW UP NOTE:     CHIEF COMPLAINT: right buttock/hip area    INTERVAL HISTORY OF PRESENT ILLNESS: Patrica Donato is a 62 y.o. female with PMH significant for HTN, hypothyroidism, and hx of right ankle surgery X 3 presents as an established patient  for the continued management of right buttock/hip pain. The patient presents today for medication refill. Today, the patient continues to localize her pain to the right side of her lower back/hip/buttock area. She reports of intermittent radiation down her RLE to her knee and ankle. She reports of benefit with Norco  mg PO TID/QID PRN. She states her pain medications allow her to function. The patient reports that her pain is worsened with activities such as vacuuming. The patient reports her pain is worse at night and when sitting in car for long periods of time.  The patient denies of any significant changes in her health since her last appointment. The patient also denies of any changes in the character of her pain since her last appointment.  The patient reports she completed PT without any benefit. The patient reports that her current pain is a 5/10. The patient reports that her pain can increase to a 10/10 at its worst. Patient denies of any urinary/fecal incontinence, saddle anesthesia, or weakness.      Of note, the patient continues to participate in home stretches and exercises as instructed by her chiropractor.     INITIAL HISTORY OF PRESENT ILLNESS: Patrica Donato is a 60 y.o. female with PMH significant for HTN, hypothyroidism, and hx of right ankle surgery X 3 presents as a referral for the evaluation of right buttock pain. The patient reports that her pain began approximately October 2020 after no inciting incident or trauma. The patient denies of experiencing this pain in the past. The patient localizes her pain to right buttock. The patient reports of radiation down the posterior aspect of her RLE to her ankle. The patient describes her pain  as an aching type of pain. The patient denies of numbness. The patient reports that her pain is a 5/10. Patient denies of any urinary/fecal incontinence, saddle anesthesia, or weakness.      The patient reports that she has had difficulty with sleeping secondary to her pain. She reports that she believes she has piriformis syndrome.      Aggravating factors: movement, prolonged sitting     Mitigating factors: ice, laying down         INTERVENTIONAL PAIN HISTORY:  9/15/2021: Lumbar interlaminar epidural steroid injection at L2-L3 under fluoroscopic guidance (right paramedian approach) - no relief  6/10/2021: Right SI joint injection (Dr. Israel) - 60% benefit x 3 weeks.  4/29/2021: Right SI joint injection (Dr. Israel) - 40% benefit X 3 weeks  3/11/2021: Bilateral L5 - S1 transforaminal CAROL () - minimal benefit  1/21/2021: L5 - S1 CAROL with right paramedian approach (Dr. Israel) - minimal benefit  12/30/2020: L5 - S1 interlaminar CAROL (Dr. Irsael) - 60% relief for about 5 days then slowly increasing pain.     12/13/2019: Right knee 40 mg triamcinolone acetonide 40 mg/mL via Dr. Russ  5/3/2017: Left knee 40 mg triamcinolone acetonide 40 mg/mL via Dr. Pablo  4/5/2017: Left knee 40 mg triamcinolone acetonide 40 mg/mL via Dr. Pablo     CURRENT PAIN MEDICATIONS:   Tylenol OTC as needed  Celebrex 200 mg PO BID  Norco  mg PO BID/TID  Gabapentin 600 mg PO TID        ROS:  Review of Systems   Constitutional: Negative for chills and fever.   HENT: Negative for sore throat.    Eyes: Negative for visual disturbance.   Respiratory: Negative for shortness of breath.    Cardiovascular: Negative for chest pain.   Gastrointestinal: Negative for nausea and vomiting.   Genitourinary: Negative for difficulty urinating.   Musculoskeletal: Positive for arthralgias, back pain and gait problem.   Skin: Negative for rash.   Allergic/Immunologic: Negative for immunocompromised state.   Neurological: Negative for syncope.  "  Hematological: Does not bruise/bleed easily.   Psychiatric/Behavioral: Negative for suicidal ideas.   All other systems reviewed and are negative.       MEDICAL, SURGICAL, FAMILY, SOCIAL HX: reviewed    MEDICATIONS/ALLERGIES: reviewed    PHYSICAL EXAM:    VITALS: Vitals reviewed.   Vitals:    08/07/23 0843   BP: 137/75   Pulse: (!) 111   SpO2: 98%   Weight: 78.9 kg (174 lb)   Height: 5' 6" (1.676 m)   PainSc:   3       Physical Exam  Vitals and nursing note reviewed.   Constitutional:       Appearance: She is not diaphoretic.   HENT:      Head: Normocephalic and atraumatic.   Eyes:      General:         Right eye: No discharge.         Left eye: No discharge.      Conjunctiva/sclera: Conjunctivae normal.   Cardiovascular:      Rate and Rhythm: Normal rate.   Pulmonary:      Effort: Pulmonary effort is normal. No respiratory distress.      Breath sounds: Normal breath sounds.   Abdominal:      Palpations: Abdomen is soft.   Skin:     General: Skin is warm and dry.      Findings: No rash.   Neurological:      Mental Status: She is alert and oriented to person, place, and time.   Psychiatric:         Mood and Affect: Mood and affect normal.         Cognition and Memory: Memory normal.         Judgment: Judgment normal.          UPPER EXTREMITIES: Normal alignment, normal range of motion, no atrophy, no skin changes,  hair growth and nail growth normal and equal bilaterally. No swelling, no tenderness.    LOWER EXTREMITIES:  Normal alignment, normal range of motion, no atrophy, no skin changes,  hair growth and nail growth normal and equal bilaterally. No swelling, no tenderness.     LUMBAR SPINE  Lumbar spine: ROM is full with flexion extension and oblique extension with no increased pain.     ((+)) Supine straight leg raise on the right   ((--)) Facet loading   Internal and external rotation of the hip causes increased pain on the right side. TTP at the site of the left greater trochanter.   Myofascial exam: No " tenderness to palpation across lumbar paraspinous muscles.     MOTOR: Tone and bulk: normal bilateral upper and lower Strength: normal   Delt      Bi         Tri        WE      WF                        R          5          5          5          5          5          5            L          5          5          5          5          5          5               IP         ADD     ABD     Quad   TA        Gas      HAM  R          5          5          5          5          5          5          5  L          5          5          5          5          5          5          5     SENSATION: Light touch and pinprick intact bilaterally  REFLEXES: normal, symmetric, nonbrisk.  Toes down, no clonus. Negative forrest's sign bilaterally.  GAIT: normal rise, base, steps, and arm swing.      IMAGING: no new imaging to review    ASSESSMENT: Patrica Donato is a 61 y.o. female with PMH significant for HTN, hypothyroidism, and hx of right ankle surgery X 3 presents as an established patient for the continued management of right buttock/hip pain.  The patient presents today for a medication refill. Treatment plan outlined below.   Encounter Diagnoses   Name Primary?    Chronic use of opiate for therapeutic purpose     Chronic pain syndrome     DDD (degenerative disc disease), lumbar Yes    Arthropathy of right sacroiliac joint     Sacroiliitis     Spinal stenosis, lumbosacral region          PLAN:  - I have stressed the importance of physical activity and a home exercise plan to help with chronic pain and improve health.  - Refilled Norco  mg PO q 6 hr PRN for breakthrough pain; # 120 tablets; 2 refills.  reviewed without discrepancies.   - UDS collected today, last took pain medication this morning.   - Refilled Gabapentin   - refilled Celebrex   - RTC in 3 months for follow-up, medication refill, and repeat UDS  All medication management performed by Dr. Keene.   Katerin Ledezma, YVROSE

## 2023-08-12 LAB
6MAM UR QL: NOT DETECTED
7AMINOCLONAZEPAM UR QL: NOT DETECTED
A-OH ALPRAZ UR QL: NOT DETECTED
ALPHA-OH-MIDAZOLAM: NOT DETECTED
ALPRAZ UR QL: NOT DETECTED
AMPHET UR QL SCN: NOT DETECTED
ANNOTATION COMMENT IMP: NORMAL
ANNOTATION COMMENT IMP: NORMAL
BARBITURATES UR QL: NOT DETECTED
BUPRENORPHINE UR QL: NOT DETECTED
BZE UR QL: NOT DETECTED
CARBOXYTHC UR QL: NOT DETECTED
CARISOPRODOL UR QL: NOT DETECTED
CLONAZEPAM UR QL: NOT DETECTED
CODEINE UR QL: NOT DETECTED
CREAT UR-MCNC: 343.9 MG/DL (ref 20–400)
DIAZEPAM UR QL: NOT DETECTED
ETHYL GLUCURONIDE UR QL: NOT DETECTED
FENTANYL UR QL: NOT DETECTED
GABAPENTIN: PRESENT
HYDROCODONE UR QL: PRESENT
HYDROMORPHONE UR QL: PRESENT
LORAZEPAM UR QL: NOT DETECTED
MDA UR QL: NOT DETECTED
MDEA UR QL: NOT DETECTED
MDMA UR QL: NOT DETECTED
ME-PHENIDATE UR QL: NOT DETECTED
METHADONE UR QL: NOT DETECTED
METHAMPHET UR QL: NOT DETECTED
MIDAZOLAM UR QL SCN: NOT DETECTED
MORPHINE UR QL: NOT DETECTED
NALOXONE: NOT DETECTED
NORBUPRENORPHINE UR QL CFM: NOT DETECTED
NORDIAZEPAM UR QL: NOT DETECTED
NORFENTANYL UR QL: NOT DETECTED
NORHYDROCODONE UR QL CFM: PRESENT
NORMEPERIDINE UR QL CFM: NOT DETECTED
NOROXYCODONE UR QL CFM: NOT DETECTED
NOROXYMORPHONE UR QL SCN: NOT DETECTED
OXAZEPAM UR QL: NOT DETECTED
OXYCODONE UR QL: NOT DETECTED
OXYMORPHONE UR QL: NOT DETECTED
PATHOLOGY STUDY: NORMAL
PCP UR QL: NOT DETECTED
PHENTERMINE UR QL: NOT DETECTED
PREGABALIN: NOT DETECTED
SERVICE CMNT-IMP: NORMAL
TAPENTADOL UR QL SCN: NOT DETECTED
TAPENTADOL UR QL SCN: NOT DETECTED
TEMAZEPAM UR QL: NOT DETECTED
TRAMADOL UR QL: NOT DETECTED
ZOLPIDEM METABOLITE: NOT DETECTED
ZOLPIDEM UR QL: NOT DETECTED

## 2023-11-06 ENCOUNTER — OFFICE VISIT (OUTPATIENT)
Dept: PAIN MEDICINE | Facility: CLINIC | Age: 63
End: 2023-11-06
Payer: COMMERCIAL

## 2023-11-06 VITALS — DIASTOLIC BLOOD PRESSURE: 69 MMHG | SYSTOLIC BLOOD PRESSURE: 145 MMHG | HEART RATE: 46 BPM

## 2023-11-06 DIAGNOSIS — M51.36 DDD (DEGENERATIVE DISC DISEASE), LUMBAR: ICD-10-CM

## 2023-11-06 DIAGNOSIS — G89.4 CHRONIC PAIN SYNDROME: ICD-10-CM

## 2023-11-06 DIAGNOSIS — M47.818 ARTHROPATHY OF RIGHT SACROILIAC JOINT: ICD-10-CM

## 2023-11-06 DIAGNOSIS — M54.16 LUMBAR RADICULOPATHY: Primary | ICD-10-CM

## 2023-11-06 DIAGNOSIS — G89.4 CHRONIC PAIN SYNDROME: Primary | ICD-10-CM

## 2023-11-06 PROCEDURE — 3078F PR MOST RECENT DIASTOLIC BLOOD PRESSURE < 80 MM HG: ICD-10-PCS | Mod: S$GLB,,,

## 2023-11-06 PROCEDURE — 1160F RVW MEDS BY RX/DR IN RCRD: CPT | Mod: S$GLB,,,

## 2023-11-06 PROCEDURE — 1159F PR MEDICATION LIST DOCUMENTED IN MEDICAL RECORD: ICD-10-PCS | Mod: S$GLB,,,

## 2023-11-06 PROCEDURE — 1160F PR REVIEW ALL MEDS BY PRESCRIBER/CLIN PHARMACIST DOCUMENTED: ICD-10-PCS | Mod: S$GLB,,,

## 2023-11-06 PROCEDURE — 3077F SYST BP >= 140 MM HG: CPT | Mod: S$GLB,,,

## 2023-11-06 PROCEDURE — 99999 PR PBB SHADOW E&M-EST. PATIENT-LVL III: ICD-10-PCS | Mod: PBBFAC,,,

## 2023-11-06 PROCEDURE — 99214 PR OFFICE/OUTPT VISIT, EST, LEVL IV, 30-39 MIN: ICD-10-PCS | Mod: S$GLB,,,

## 2023-11-06 PROCEDURE — 3077F PR MOST RECENT SYSTOLIC BLOOD PRESSURE >= 140 MM HG: ICD-10-PCS | Mod: S$GLB,,,

## 2023-11-06 PROCEDURE — 3078F DIAST BP <80 MM HG: CPT | Mod: S$GLB,,,

## 2023-11-06 PROCEDURE — 1159F MED LIST DOCD IN RCRD: CPT | Mod: S$GLB,,,

## 2023-11-06 PROCEDURE — 99999 PR PBB SHADOW E&M-EST. PATIENT-LVL III: CPT | Mod: PBBFAC,,,

## 2023-11-06 PROCEDURE — 99214 OFFICE O/P EST MOD 30 MIN: CPT | Mod: S$GLB,,,

## 2023-11-06 RX ORDER — CELECOXIB 200 MG/1
200 CAPSULE ORAL 2 TIMES DAILY
Qty: 60 CAPSULE | Refills: 11 | Status: SHIPPED | OUTPATIENT
Start: 2023-11-06

## 2023-11-06 RX ORDER — HYDROCODONE BITARTRATE AND ACETAMINOPHEN 10; 325 MG/1; MG/1
1 TABLET ORAL EVERY 6 HOURS PRN
Qty: 120 TABLET | Refills: 0 | Status: SHIPPED | OUTPATIENT
Start: 2024-01-26 | End: 2024-02-26 | Stop reason: ALTCHOICE

## 2023-11-06 RX ORDER — HYDROCODONE BITARTRATE AND ACETAMINOPHEN 10; 325 MG/1; MG/1
1 TABLET ORAL EVERY 6 HOURS PRN
Qty: 120 TABLET | Refills: 0 | Status: SHIPPED | OUTPATIENT
Start: 2023-12-01 | End: 2024-02-26 | Stop reason: ALTCHOICE

## 2023-11-06 RX ORDER — HYDROCODONE BITARTRATE AND ACETAMINOPHEN 10; 325 MG/1; MG/1
1 TABLET ORAL EVERY 6 HOURS PRN
Qty: 120 TABLET | Refills: 0 | Status: SHIPPED | OUTPATIENT
Start: 2023-12-29 | End: 2024-02-26 | Stop reason: ALTCHOICE

## 2023-11-06 NOTE — PROGRESS NOTES
FOLLOW UP NOTE:     CHIEF COMPLAINT: right buttock/hip area    INTERVAL HISTORY OF PRESENT ILLNESS: Patrica Donato is a 62 y.o. female with PMH significant for HTN, hypothyroidism, and hx of right ankle surgery X 3 presents as an established patient  for the continued management of right buttock/hip pain. The patient presents today for medication refill. Today, the patient continues to localize her pain to the right side of her lower back/hip/buttock area. She reports of intermittent radiation down her RLE to her knee and ankle. The patient reports her pain is unchanged since her last clinic visit. She reports of benefit with Norco  mg PO TID/QID PRN. She states her pain medications allow her to function. The patient reports that her pain is worsened with activities such as vacuuming. The patient reports her pain is worse at night and when sitting in car for long periods of time.  The patient denies of any significant changes in her health since her last appointment. The patient also denies of any changes in the character of her pain since her last appointment.  The patient reports she completed PT without any benefit. The patient reports that her current pain is a 3/10. The patient reports that her pain can increase to a 10/10 at its worst. Patient denies of any urinary/fecal incontinence, saddle anesthesia, or weakness.     Of note, the patient continues to participate in home stretches and exercises as instructed by her chiropractor.     INITIAL HISTORY OF PRESENT ILLNESS: Patrica Donato is a 60 y.o. female with PMH significant for HTN, hypothyroidism, and hx of right ankle surgery X 3 presents as a referral for the evaluation of right buttock pain. The patient reports that her pain began approximately October 2020 after no inciting incident or trauma. The patient denies of experiencing this pain in the past. The patient localizes her pain to right buttock. The patient reports of radiation down the  posterior aspect of her RLE to her ankle. The patient describes her pain as an aching type of pain. The patient denies of numbness. The patient reports that her pain is a 5/10. Patient denies of any urinary/fecal incontinence, saddle anesthesia, or weakness.      The patient reports that she has had difficulty with sleeping secondary to her pain. She reports that she believes she has piriformis syndrome.      Aggravating factors: movement, prolonged sitting     Mitigating factors: ice, laying down         INTERVENTIONAL PAIN HISTORY:  9/15/2021: Lumbar interlaminar epidural steroid injection at L2-L3 under fluoroscopic guidance (right paramedian approach) - no relief  6/10/2021: Right SI joint injection (Dr. Israel) - 60% benefit x 3 weeks.  4/29/2021: Right SI joint injection (Dr. Israel) - 40% benefit X 3 weeks  3/11/2021: Bilateral L5 - S1 transforaminal CAROL () - minimal benefit  1/21/2021: L5 - S1 CAROL with right paramedian approach (Dr. Israel) - minimal benefit  12/30/2020: L5 - S1 interlaminar CAROL (Dr. Israel) - 60% relief for about 5 days then slowly increasing pain.     12/13/2019: Right knee 40 mg triamcinolone acetonide 40 mg/mL via Dr. Russ  5/3/2017: Left knee 40 mg triamcinolone acetonide 40 mg/mL via Dr. Pablo  4/5/2017: Left knee 40 mg triamcinolone acetonide 40 mg/mL via Dr. Pablo     CURRENT PAIN MEDICATIONS:   Tylenol OTC as needed  Celebrex 200 mg PO BID  Norco  mg PO BID/TID  Gabapentin 600 mg PO TID        ROS:  Review of Systems   Constitutional: Negative for chills and fever.   HENT: Negative for sore throat.    Eyes: Negative for visual disturbance.   Respiratory: Negative for shortness of breath.    Cardiovascular: Negative for chest pain.   Gastrointestinal: Negative for nausea and vomiting.   Genitourinary: Negative for difficulty urinating.   Musculoskeletal: Positive for arthralgias, back pain and gait problem.   Skin: Negative for rash.   Allergic/Immunologic: Negative for  immunocompromised state.   Neurological: Negative for syncope.   Hematological: Does not bruise/bleed easily.   Psychiatric/Behavioral: Negative for suicidal ideas.   All other systems reviewed and are negative.       MEDICAL, SURGICAL, FAMILY, SOCIAL HX: reviewed    MEDICATIONS/ALLERGIES: reviewed    PHYSICAL EXAM:    VITALS: Vitals reviewed.   Vitals:    11/06/23 0859   BP: (!) 145/69   Pulse: (!) 46         Physical Exam  Vitals and nursing note reviewed.   Constitutional:       Appearance: She is not diaphoretic.   HENT:      Head: Normocephalic and atraumatic.   Eyes:      General:         Right eye: No discharge.         Left eye: No discharge.      Conjunctiva/sclera: Conjunctivae normal.   Cardiovascular:      Rate and Rhythm: Normal rate.   Pulmonary:      Effort: Pulmonary effort is normal. No respiratory distress.      Breath sounds: Normal breath sounds.   Abdominal:      Palpations: Abdomen is soft.   Skin:     General: Skin is warm and dry.      Findings: No rash.   Neurological:      Mental Status: She is alert and oriented to person, place, and time.   Psychiatric:         Mood and Affect: Mood and affect normal.         Cognition and Memory: Memory normal.         Judgment: Judgment normal.          UPPER EXTREMITIES: Normal alignment, normal range of motion, no atrophy, no skin changes,  hair growth and nail growth normal and equal bilaterally. No swelling, no tenderness.    LOWER EXTREMITIES:  Normal alignment, normal range of motion, no atrophy, no skin changes,  hair growth and nail growth normal and equal bilaterally. No swelling, no tenderness.     LUMBAR SPINE  Lumbar spine: ROM is full with flexion extension and oblique extension with no increased pain.     ((+)) Supine straight leg raise on the right   ((--)) Facet loading   Internal and external rotation of the hip causes increased pain on the right side. TTP at the site of the left greater trochanter.   Myofascial exam: No tenderness to  palpation across lumbar paraspinous muscles.     MOTOR: Tone and bulk: normal bilateral upper and lower Strength: normal   Delt      Bi         Tri        WE      WF                        R          5          5          5          5          5          5            L          5          5          5          5          5          5               IP         ADD     ABD     Quad   TA        Gas      HAM  R          5          5          5          5          5          5          5  L          5          5          5          5          5          5          5     SENSATION: Light touch and pinprick intact bilaterally  REFLEXES: normal, symmetric, nonbrisk.  Toes down, no clonus. Negative forrest's sign bilaterally.  GAIT: normal rise, base, steps, and arm swing.      IMAGING: no new imaging to review    ASSESSMENT: Patrica Donato is a 61 y.o. female with PMH significant for HTN, hypothyroidism, and hx of right ankle surgery X 3 presents as an established patient for the continued management of right buttock/hip pain.  The patient presents today for a medication refill. Treatment plan outlined below.   Encounter Diagnoses   Name Primary?    DDD (degenerative disc disease), lumbar     Lumbar radiculopathy Yes    Arthropathy of right sacroiliac joint     Chronic pain syndrome            PLAN:  - I have stressed the importance of physical activity and a home exercise plan to help with chronic pain and improve health.  - Refilled Norco  mg PO q 6 hr PRN for breakthrough pain; # 120 tablets; 2 refills.  reviewed without discrepancies.   - Prior UDS reviewed and consistent   - continue Gabapentin   - refilled Celebrex   - RTC in 3 months for follow-up, medication refill, and repeat UDS  All medication management performed by Dr. Keene.   Katerin Ledezma, YVROSE

## 2024-02-26 ENCOUNTER — OFFICE VISIT (OUTPATIENT)
Dept: PAIN MEDICINE | Facility: CLINIC | Age: 64
End: 2024-02-26
Payer: COMMERCIAL

## 2024-02-26 VITALS — BODY MASS INDEX: 29.71 KG/M2 | RESPIRATION RATE: 16 BRPM | HEIGHT: 64 IN | WEIGHT: 174 LBS

## 2024-02-26 DIAGNOSIS — M51.36 DDD (DEGENERATIVE DISC DISEASE), LUMBAR: ICD-10-CM

## 2024-02-26 DIAGNOSIS — M48.07 SPINAL STENOSIS, LUMBOSACRAL REGION: ICD-10-CM

## 2024-02-26 DIAGNOSIS — M54.16 LUMBAR RADICULOPATHY: Primary | ICD-10-CM

## 2024-02-26 DIAGNOSIS — G89.4 CHRONIC PAIN SYNDROME: Primary | ICD-10-CM

## 2024-02-26 DIAGNOSIS — Z79.891 CHRONIC USE OF OPIATE FOR THERAPEUTIC PURPOSE: ICD-10-CM

## 2024-02-26 PROCEDURE — 99214 OFFICE O/P EST MOD 30 MIN: CPT | Mod: S$GLB,,,

## 2024-02-26 PROCEDURE — 80326 AMPHETAMINES 5 OR MORE: CPT

## 2024-02-26 PROCEDURE — 3008F BODY MASS INDEX DOCD: CPT | Mod: S$GLB,,,

## 2024-02-26 PROCEDURE — 99999 PR PBB SHADOW E&M-EST. PATIENT-LVL II: CPT | Mod: PBBFAC,,,

## 2024-02-26 RX ORDER — HYDROCODONE BITARTRATE AND ACETAMINOPHEN 10; 325 MG/1; MG/1
1 TABLET ORAL EVERY 6 HOURS PRN
Qty: 120 TABLET | Refills: 0 | Status: SHIPPED | OUTPATIENT
Start: 2024-04-18 | End: 2024-04-01 | Stop reason: CLARIF

## 2024-02-26 RX ORDER — HYDROCODONE BITARTRATE AND ACETAMINOPHEN 10; 325 MG/1; MG/1
1 TABLET ORAL EVERY 6 HOURS PRN
Qty: 120 TABLET | Refills: 0 | Status: SHIPPED | OUTPATIENT
Start: 2024-03-20 | End: 2024-04-01 | Stop reason: CLARIF

## 2024-02-26 RX ORDER — GABAPENTIN 600 MG/1
600 TABLET ORAL 3 TIMES DAILY
Qty: 270 TABLET | Refills: 3 | Status: SHIPPED | OUTPATIENT
Start: 2024-02-26 | End: 2025-02-20

## 2024-02-26 NOTE — PROGRESS NOTES
FOLLOW UP NOTE:     CHIEF COMPLAINT: right buttock/hip area    INTERVAL HISTORY OF PRESENT ILLNESS: Patrica Donato is a 62 y.o. female with PMH significant for HTN, hypothyroidism, and hx of right ankle surgery X 3 presents as an established patient  for the continued management of right buttock/hip pain. The patient presents today for medication refill. Today, the patient continues to localize her pain to the right side of her lower back/hip/buttock area. She reports of intermittent radiation down her RLE to her knee and ankle. The patient reports her pain is unchanged since her last clinic visit. She reports of benefit with Norco  mg PO TID/QID PRN. She states her pain medications allow her to function. The patient reports that her pain is worsened with activities such as vacuuming. The patient reports her pain is worse at night and when sitting in car for long periods of time.  The patient denies of any significant changes in her health since her last appointment. The patient reports of a fall 3 weeks ago and reports left shoulder pain s/p fall. The patient reports of noticeable improvement of ROM.  The patient also denies of any changes in the character of her pain since her last appointment.  The patient reports she completed PT without any benefit. The patient reports that her current pain is a 3/10. The patient reports that her pain can increase to a 10/10 at its worst. Patient denies of any urinary/fecal incontinence, saddle anesthesia, or weakness.     Of note, the patient continues to participate in home stretches and exercises as instructed by her chiropractor.     INITIAL HISTORY OF PRESENT ILLNESS: Patrica Donato is a 60 y.o. female with PMH significant for HTN, hypothyroidism, and hx of right ankle surgery X 3 presents as a referral for the evaluation of right buttock pain. The patient reports that her pain began approximately October 2020 after no inciting incident or trauma. The patient  denies of experiencing this pain in the past. The patient localizes her pain to right buttock. The patient reports of radiation down the posterior aspect of her RLE to her ankle. The patient describes her pain as an aching type of pain. The patient denies of numbness. The patient reports that her pain is a 5/10. Patient denies of any urinary/fecal incontinence, saddle anesthesia, or weakness.      The patient reports that she has had difficulty with sleeping secondary to her pain. She reports that she believes she has piriformis syndrome.      Aggravating factors: movement, prolonged sitting     Mitigating factors: ice, laying down         INTERVENTIONAL PAIN HISTORY:  9/15/2021: Lumbar interlaminar epidural steroid injection at L2-L3 under fluoroscopic guidance (right paramedian approach) - no relief  6/10/2021: Right SI joint injection (Dr. Israel) - 60% benefit x 3 weeks.  4/29/2021: Right SI joint injection (Dr. Israel) - 40% benefit X 3 weeks  3/11/2021: Bilateral L5 - S1 transforaminal CAROL () - minimal benefit  1/21/2021: L5 - S1 CAROL with right paramedian approach (Dr. Israel) - minimal benefit  12/30/2020: L5 - S1 interlaminar CAROL (Dr. Israel) - 60% relief for about 5 days then slowly increasing pain.     12/13/2019: Right knee 40 mg triamcinolone acetonide 40 mg/mL via Dr. Russ  5/3/2017: Left knee 40 mg triamcinolone acetonide 40 mg/mL via Dr. Pablo  4/5/2017: Left knee 40 mg triamcinolone acetonide 40 mg/mL via Dr. Pablo     CURRENT PAIN MEDICATIONS:   Tylenol OTC as needed  Celebrex 200 mg PO BID  Norco  mg PO BID/TID  Gabapentin 600 mg PO TID        ROS:  Review of Systems   Constitutional: Negative for chills and fever.   HENT: Negative for sore throat.    Eyes: Negative for visual disturbance.   Respiratory: Negative for shortness of breath.    Cardiovascular: Negative for chest pain.   Gastrointestinal: Negative for nausea and vomiting.   Genitourinary: Negative for difficulty urinating.  "  Musculoskeletal: Positive for arthralgias, back pain and gait problem.   Skin: Negative for rash.   Allergic/Immunologic: Negative for immunocompromised state.   Neurological: Negative for syncope.   Hematological: Does not bruise/bleed easily.   Psychiatric/Behavioral: Negative for suicidal ideas.   All other systems reviewed and are negative.       MEDICAL, SURGICAL, FAMILY, SOCIAL HX: reviewed    MEDICATIONS/ALLERGIES: reviewed    PHYSICAL EXAM:    VITALS: Vitals reviewed.   Vitals:    02/26/24 1138   Resp: 16   Weight: 78.9 kg (174 lb)   Height: 5' 4" (1.626 m)           Physical Exam  Vitals and nursing note reviewed.   Constitutional:       Appearance: She is not diaphoretic.   HENT:      Head: Normocephalic and atraumatic.   Eyes:      General:         Right eye: No discharge.         Left eye: No discharge.      Conjunctiva/sclera: Conjunctivae normal.   Cardiovascular:      Rate and Rhythm: Normal rate.   Pulmonary:      Effort: Pulmonary effort is normal. No respiratory distress.      Breath sounds: Normal breath sounds.   Abdominal:      Palpations: Abdomen is soft.   Skin:     General: Skin is warm and dry.      Findings: No rash.   Neurological:      Mental Status: She is alert and oriented to person, place, and time.   Psychiatric:         Mood and Affect: Mood and affect normal.         Cognition and Memory: Memory normal.         Judgment: Judgment normal.          UPPER EXTREMITIES: Normal alignment, normal range of motion, no atrophy, no skin changes,  hair growth and nail growth normal and equal bilaterally. No swelling, no tenderness.    LOWER EXTREMITIES:  Normal alignment, normal range of motion, no atrophy, no skin changes,  hair growth and nail growth normal and equal bilaterally. No swelling, no tenderness.     LUMBAR SPINE  Lumbar spine: ROM is full with flexion extension and oblique extension with no increased pain.     ((+)) Supine straight leg raise on the right   ((--)) Facet loading "   Internal and external rotation of the hip causes increased pain on the right side. TTP at the site of the left greater trochanter.   Myofascial exam: No tenderness to palpation across lumbar paraspinous muscles.     MOTOR: Tone and bulk: normal bilateral upper and lower Strength: normal   Delt      Bi         Tri        WE      WF                        R          5          5          5          5          5          5            L          5          5          5          5          5          5               IP         ADD     ABD     Quad   TA        Gas      HAM  R          5          5          5          5          5          5          5  L          5          5          5          5          5          5          5     SENSATION: Light touch and pinprick intact bilaterally  REFLEXES: normal, symmetric, nonbrisk.  Toes down, no clonus. Negative forrest's sign bilaterally.  GAIT: normal rise, base, steps, and arm swing.      IMAGING: no new imaging to review    ASSESSMENT: Patrica Donato is a 61 y.o. female with PMH significant for HTN, hypothyroidism, and hx of right ankle surgery X 3 presents as an established patient for the continued management of right buttock/hip pain.  The patient presents today for a medication refill. Treatment plan outlined below.   Encounter Diagnoses   Name Primary?    Chronic use of opiate for therapeutic purpose     DDD (degenerative disc disease), lumbar     Lumbar radiculopathy Yes    Spinal stenosis, lumbosacral region              PLAN:  - I have stressed the importance of physical activity and a home exercise plan to help with chronic pain and improve health.  - Refilled Norco  mg PO q 6 hr PRN for breakthrough pain; # 120 tablets; 2 refills.  reviewed without discrepancies.   - UDS collected today, the patient last had pain medication this morning.   - refilled Gabapentin   - continue Celebrex   - RTC in 3 months for follow-up, medication refill  All  medication management performed by Dr. Keene.   Katerin Ledezma, NP

## 2024-03-01 LAB
6MAM UR QL: NOT DETECTED
7AMINOCLONAZEPAM UR QL: NOT DETECTED
A-OH ALPRAZ UR QL: NOT DETECTED
ALPHA-OH-MIDAZOLAM: NOT DETECTED
ALPRAZ UR QL: NOT DETECTED
AMPHET UR QL SCN: NOT DETECTED
ANNOTATION COMMENT IMP: NORMAL
BARBITURATES UR QL: NEGATIVE
BUPRENORPHINE UR QL: NOT DETECTED
BZE UR QL: NEGATIVE
CARBOXYTHC UR QL: NEGATIVE
CARISOPRODOL UR QL: NEGATIVE
CLONAZEPAM UR QL: NOT DETECTED
CODEINE UR QL: NOT DETECTED
CREAT UR-MCNC: 139.9 MG/DL (ref 20–400)
DIAZEPAM UR QL: NOT DETECTED
ETHYL GLUCURONIDE UR QL: NEGATIVE
FENTANYL UR QL: NOT DETECTED
GABAPENTIN: PRESENT
HYDROCODONE UR QL: PRESENT
HYDROMORPHONE UR QL: PRESENT
LORAZEPAM UR QL: NOT DETECTED
MDA UR QL: NOT DETECTED
MDEA UR QL: NOT DETECTED
MDMA UR QL: NOT DETECTED
ME-PHENIDATE UR QL: NOT DETECTED
METHADONE UR QL: NEGATIVE
METHAMPHET UR QL: NOT DETECTED
MIDAZOLAM UR QL SCN: NOT DETECTED
MORPHINE UR QL: NOT DETECTED
NALOXONE: NOT DETECTED
NORBUPRENORPHINE UR QL CFM: NOT DETECTED
NORDIAZEPAM UR QL: NOT DETECTED
NORFENTANYL UR QL: NOT DETECTED
NORHYDROCODONE UR QL CFM: PRESENT
NORMEPERIDINE UR QL CFM: NOT DETECTED
NOROXYCODONE UR QL CFM: NOT DETECTED
NOROXYMORPHONE UR QL SCN: NOT DETECTED
OXAZEPAM UR QL: NOT DETECTED
OXYCODONE UR QL: NOT DETECTED
OXYMORPHONE UR QL: NOT DETECTED
PATHOLOGY STUDY: NORMAL
PCP UR QL: NEGATIVE
PHENTERMINE UR QL: NOT DETECTED
PREGABALIN: NOT DETECTED
SERVICE CMNT-IMP: NORMAL
TAPENTADOL UR QL SCN: NOT DETECTED
TAPENTADOL UR QL SCN: NOT DETECTED
TEMAZEPAM UR QL: NOT DETECTED
TRAMADOL UR QL: NEGATIVE
ZOLPIDEM METABOLITE: NOT DETECTED
ZOLPIDEM UR QL: NOT DETECTED

## 2024-04-01 DIAGNOSIS — G89.4 CHRONIC PAIN SYNDROME: Primary | ICD-10-CM

## 2024-04-01 DIAGNOSIS — M51.36 DDD (DEGENERATIVE DISC DISEASE), LUMBAR: ICD-10-CM

## 2024-04-01 RX ORDER — HYDROCODONE BITARTRATE AND ACETAMINOPHEN 10; 325 MG/1; MG/1
1 TABLET ORAL EVERY 6 HOURS PRN
Qty: 28 TABLET | Refills: 0 | Status: SHIPPED | OUTPATIENT
Start: 2024-04-01 | End: 2024-04-08

## 2024-04-01 RX ORDER — HYDROCODONE BITARTRATE AND ACETAMINOPHEN 10; 325 MG/1; MG/1
1 TABLET ORAL EVERY 6 HOURS PRN
Qty: 120 TABLET | Refills: 0 | Status: SHIPPED | OUTPATIENT
Start: 2024-04-30 | End: 2024-04-01

## 2024-04-01 RX ORDER — HYDROCODONE BITARTRATE AND ACETAMINOPHEN 10; 325 MG/1; MG/1
1 TABLET ORAL EVERY 6 HOURS PRN
Qty: 120 TABLET | Refills: 0 | Status: SHIPPED | OUTPATIENT
Start: 2024-04-01 | End: 2024-04-01

## 2024-05-29 ENCOUNTER — HOSPITAL ENCOUNTER (OUTPATIENT)
Dept: RADIOLOGY | Facility: HOSPITAL | Age: 64
Discharge: HOME | End: 2024-05-29
Attending: ANESTHESIOLOGY
Payer: MEDICARE

## 2024-05-29 DIAGNOSIS — M54.89 OTHER DORSALGIA: ICD-10-CM

## 2024-05-29 PROCEDURE — 72148 MRI LUMBAR SPINE W/O DYE: CPT | Mod: TC,PO

## 2024-05-29 PROCEDURE — 72148 MRI LUMBAR SPINE W/O DYE: CPT | Mod: 26,,, | Performed by: RADIOLOGY

## 2024-08-06 ENCOUNTER — OFFICE VISIT (OUTPATIENT)
Dept: URGENT CARE | Facility: CLINIC | Age: 64
End: 2024-08-06
Payer: MEDICARE

## 2024-08-06 VITALS
WEIGHT: 183.06 LBS | DIASTOLIC BLOOD PRESSURE: 83 MMHG | OXYGEN SATURATION: 98 % | HEIGHT: 66 IN | TEMPERATURE: 98 F | RESPIRATION RATE: 16 BRPM | SYSTOLIC BLOOD PRESSURE: 150 MMHG | BODY MASS INDEX: 29.42 KG/M2 | HEART RATE: 78 BPM

## 2024-08-06 DIAGNOSIS — T78.40XA ALLERGIC REACTION, INITIAL ENCOUNTER: Primary | ICD-10-CM

## 2024-08-06 DIAGNOSIS — L25.5 CONTACT DERMATITIS DUE TO PLANTS, EXCEPT FOOD, UNSPECIFIED CONTACT DERMATITIS TYPE: ICD-10-CM

## 2024-08-06 DIAGNOSIS — L29.9 ITCHING: ICD-10-CM

## 2024-08-06 PROCEDURE — 96372 THER/PROPH/DIAG INJ SC/IM: CPT | Mod: ,,, | Performed by: NURSE PRACTITIONER

## 2024-08-06 PROCEDURE — 99213 OFFICE O/P EST LOW 20 MIN: CPT | Mod: 25,,, | Performed by: NURSE PRACTITIONER

## 2024-08-06 RX ORDER — METHYLPREDNISOLONE SOD SUCC 125 MG
125 VIAL (EA) INJECTION
Status: COMPLETED | OUTPATIENT
Start: 2024-08-06 | End: 2024-08-06

## 2024-08-06 RX ORDER — HYDROCODONE BITARTRATE AND ACETAMINOPHEN 10; 325 MG/1; MG/1
1 TABLET ORAL EVERY 6 HOURS PRN
COMMUNITY

## 2024-08-06 RX ORDER — HYDROXYZINE HYDROCHLORIDE 25 MG/1
25 TABLET, FILM COATED ORAL EVERY 8 HOURS PRN
Qty: 14 TABLET | Refills: 0 | Status: SHIPPED | OUTPATIENT
Start: 2024-08-06

## 2024-08-06 RX ORDER — PREDNISONE 20 MG/1
TABLET ORAL
Qty: 5 TABLET | Refills: 0 | Status: SHIPPED | OUTPATIENT
Start: 2024-08-06 | End: 2024-08-10

## 2024-08-06 RX ADMIN — Medication 125 MG: at 10:08

## 2024-11-06 ENCOUNTER — HOSPITAL ENCOUNTER (OUTPATIENT)
Dept: RADIOLOGY | Facility: HOSPITAL | Age: 64
Discharge: HOME | End: 2024-11-06
Attending: INTERNAL MEDICINE
Payer: MEDICARE

## 2024-11-06 DIAGNOSIS — N95.9 UNSPECIFIED MENOPAUSAL AND PERIMENOPAUSAL DISORDER: ICD-10-CM

## 2024-11-06 PROCEDURE — 77080 DXA BONE DENSITY AXIAL: CPT | Mod: TC

## 2024-11-06 PROCEDURE — 77080 DXA BONE DENSITY AXIAL: CPT | Mod: 26,,, | Performed by: RADIOLOGY

## 2024-11-07 ENCOUNTER — HOSPITAL ENCOUNTER (OUTPATIENT)
Dept: RADIOLOGY | Facility: HOSPITAL | Age: 64
Discharge: HOME | End: 2024-11-07
Attending: INTERNAL MEDICINE
Payer: MEDICARE

## 2024-11-07 DIAGNOSIS — Z12.31 ENCOUNTER FOR SCREENING MAMMOGRAM FOR MALIGNANT NEOPLASM OF BREAST: ICD-10-CM

## 2024-11-07 PROCEDURE — 77067 SCR MAMMO BI INCL CAD: CPT | Mod: TC

## 2024-11-07 PROCEDURE — 77067 SCR MAMMO BI INCL CAD: CPT | Mod: 26,,, | Performed by: RADIOLOGY

## 2024-11-07 PROCEDURE — 77063 BREAST TOMOSYNTHESIS BI: CPT | Mod: 26,,, | Performed by: RADIOLOGY

## 2025-02-04 ENCOUNTER — HOSPITAL ENCOUNTER (OUTPATIENT)
Facility: HOSPITAL | Age: 65
Discharge: HOME | End: 2025-02-04
Attending: ANESTHESIOLOGY | Admitting: ANESTHESIOLOGY
Payer: MEDICARE

## 2025-02-04 VITALS
WEIGHT: 184 LBS | BODY MASS INDEX: 29.57 KG/M2 | SYSTOLIC BLOOD PRESSURE: 180 MMHG | RESPIRATION RATE: 16 BRPM | TEMPERATURE: 99 F | DIASTOLIC BLOOD PRESSURE: 85 MMHG | HEART RATE: 80 BPM | HEIGHT: 66 IN | OXYGEN SATURATION: 95 %

## 2025-02-04 DIAGNOSIS — M53.3 SACROILIAC JOINT DYSFUNCTION: Primary | ICD-10-CM

## 2025-02-04 PROCEDURE — 25500020 PHARM REV CODE 255: Performed by: ANESTHESIOLOGY

## 2025-02-04 PROCEDURE — 27096 INJECT SACROILIAC JOINT: CPT | Mod: RT | Performed by: ANESTHESIOLOGY

## 2025-02-04 PROCEDURE — 63600175 PHARM REV CODE 636 W HCPCS: Performed by: ANESTHESIOLOGY

## 2025-02-04 RX ORDER — BUPIVACAINE HYDROCHLORIDE 5 MG/ML
INJECTION, SOLUTION EPIDURAL; INTRACAUDAL
Status: DISCONTINUED | OUTPATIENT
Start: 2025-02-04 | End: 2025-02-04 | Stop reason: HOSPADM

## 2025-02-04 RX ORDER — METHYLPREDNISOLONE ACETATE 80 MG/ML
INJECTION, SUSPENSION INTRA-ARTICULAR; INTRALESIONAL; INTRAMUSCULAR; SOFT TISSUE
Status: DISCONTINUED | OUTPATIENT
Start: 2025-02-04 | End: 2025-02-04 | Stop reason: HOSPADM

## 2025-02-04 RX ORDER — SODIUM CHLORIDE, SODIUM LACTATE, POTASSIUM CHLORIDE, CALCIUM CHLORIDE 600; 310; 30; 20 MG/100ML; MG/100ML; MG/100ML; MG/100ML
INJECTION, SOLUTION INTRAVENOUS CONTINUOUS
Status: DISCONTINUED | OUTPATIENT
Start: 2025-02-04 | End: 2025-02-04 | Stop reason: HOSPADM

## 2025-02-04 RX ORDER — LIDOCAINE HYDROCHLORIDE 10 MG/ML
INJECTION, SOLUTION INFILTRATION; PERINEURAL
Status: DISCONTINUED | OUTPATIENT
Start: 2025-02-04 | End: 2025-02-04 | Stop reason: HOSPADM

## 2025-02-04 NOTE — DISCHARGE SUMMARY
[facility], operated by [facility]  Discharge Note  Short Stay    Procedure(s) (LRB):  SI JOINT INJECTION RIGHT (Right)      OUTCOME: Patient tolerated treatment/procedure well without complication and is now ready for discharge.    DISPOSITION: Home or Self Care    FINAL DIAGNOSIS: Sacroiliac joint dysfunction    FOLLOWUP: In clinic    DISCHARGE INSTRUCTIONS:    Discharge Procedure Orders   Diet general     Call MD for:  temperature >100.4     Call MD for:  persistent nausea and vomiting     Call MD for:  severe uncontrolled pain     Call MD for:  difficulty breathing, headache or visual disturbances     Call MD for:  redness, tenderness, or signs of infection (pain, swelling, redness, odor or green/yellow discharge around incision site)     Call MD for:  hives     Call MD for:  persistent dizziness or light-headedness     Call MD for:  extreme fatigue        TIME SPENT ON DISCHARGE: 15 minutes

## 2025-02-04 NOTE — PLAN OF CARE
Discharge instructions given to patient , she voiced no questions or concerns at this time, patient changing for discharge.

## 2025-02-04 NOTE — PLAN OF CARE
0953 Received patient from procedure room.  Report received, placed patient on monitors, VSS.  Will continue to monitor patient per protocol until discharge.

## 2025-02-04 NOTE — DISCHARGE INSTRUCTIONS
[facility] [phone]  [facility], [place], operated by [facility] [phone]

## 2025-02-04 NOTE — H&P
"FOLLOW UP NOTE:     CHIEF COMPLAINT: back pain    INTERVAL HISTORY OF PRESENT ILLNESS: Patirca Donato is a 64 y.o. female who presents for SI JOINT INJECTION RIGHT . The patient denies of any significant changes in her health since her last appointment. The patient also denies of any changes in the character of her pain since her last appointment.     ROS:  Review of Systems   Constitutional:  Negative for chills and fever.   HENT:  Negative for sore throat.    Eyes:  Negative for visual disturbance.   Respiratory:  Negative for shortness of breath.    Cardiovascular:  Negative for chest pain.   Gastrointestinal:  Negative for nausea and vomiting.   Genitourinary:  Negative for difficulty urinating.   Musculoskeletal:  Positive for arthralgias and back pain.   Skin:  Negative for rash.   Allergic/Immunologic: Negative for immunocompromised state.   Neurological:  Negative for syncope.   Hematological:  Does not bruise/bleed easily.   Psychiatric/Behavioral:  Negative for suicidal ideas.         MEDICAL, SURGICAL, FAMILY, SOCIAL HX: reviewed    MEDICATIONS/ALLERGIES: reviewed    PHYSICAL EXAM:    VITALS: Vitals reviewed.   Vitals:    02/04/25 0827   BP: (!) 172/86   Pulse: 64   Resp: 14   Temp: 98.6 °F (37 °C)   TempSrc: Oral   SpO2: 96%   Weight: 83.5 kg (184 lb)   Height: 5' 6" (1.676 m)       Physical Exam  Vitals and nursing note reviewed.   Constitutional:       Appearance: Normal appearance. She is not toxic-appearing or diaphoretic.   HENT:      Head: Normocephalic and atraumatic.   Eyes:      General:         Right eye: No discharge.         Left eye: No discharge.      Extraocular Movements: Extraocular movements intact.      Conjunctiva/sclera: Conjunctivae normal.   Cardiovascular:      Rate and Rhythm: Normal rate.   Pulmonary:      Effort: Pulmonary effort is normal. No respiratory distress.      Breath sounds: Normal breath sounds.   Abdominal:      Palpations: Abdomen is soft.   Skin:     General: " Skin is warm and dry.      Findings: No rash.   Neurological:      Mental Status: She is alert and oriented to person, place, and time.   Psychiatric:         Mood and Affect: Mood and affect normal.         Cognition and Memory: Memory normal.         Judgment: Judgment normal.          EXTREMITIES:    Gen: No cyanosis, edema, varicosities, or tenderness to palpation BLE   Skin: Warm, pink, dry, no rashes, no lesions BLE   Strength: 5/5 motor strength BLE   ROM: hips, knees and ankles without pain or instability.     NEUROLOGICAL:    Gen: No clonus or spasticity.   Gait: Normal without antalgic lean   DTR's: 2+ in bilateral patellar, and ankle   BABINSKI: Absent bilaterally  Sensory: Intact to light touch and proprioception BLE    ASSESSMENT: Patrica Donato is a 64 y.o. female who presents for SI JOINT INJECTION RIGHT .     PLAN:  Proceed with SI JOINT INJECTION RIGHT  as previously discussed.    This patient has been cleared for surgery in an ambulatory surgical facility    ASA 3,  Mallampatti Score 3  No history of anesthetic complications  Plan for RN IV sedation    Guy Israel MD  Pain Management

## 2025-02-04 NOTE — OP NOTE
Procedure Date: 2/4/2025    PROCEDURE:  Right sacroiliac joint injection utilizing fluoroscopy.    DIAGNOSIS: Right sided sacroiliitis.  Post op diagnosis: same    PHYSICIAN: Guy Israel MD    MEDICATIONS INJECTED:  Methylprednisone 80 mg and 2 ml Bupivacaine 0.5%.    LOCAL ANESTHETIC USED: Lidocaine 1%,5 ml total.     SEDATION MEDICATIONS: N/A    ESTIMATED BLOOD LOSS: none    COMPLICATIONS: none    TECHNIQUE:   Time-out taken to identify patient and procedure side prior to starting the procedure. After placing the patient in the prone position, the patient was prepped and draped in the usual sterile fashion using ChloraPrep and sterile towels.  The area was determined under fluoroscopy in the AP view.  Lidocaine was injected by raising a wheal and going down to the periosteum using a 25-gauge 1.5 inch needle.  The 3.5 inch 22-gauge spinal needle was introduced into inferior opening of the right sacroiliac joint.  Negative pressure applied to confirm no intravascular placement.  3 ml of contrast dye was injected to confirm placement and to confirm that there was no vascular uptake. The medication was then injected slowly. The patient tolerated the procedure well.    The patient was monitored after the procedure.  The patient was given post procedure and discharge instructions to follow at home. The patient was discharged in a stable condition

## 2025-07-14 ENCOUNTER — TELEPHONE (OUTPATIENT)
Dept: ORTHOPEDICS | Facility: CLINIC | Age: 65
End: 2025-07-14
Payer: MEDICARE

## 2025-08-01 ENCOUNTER — HOSPITAL ENCOUNTER (OUTPATIENT)
Dept: RADIOLOGY | Facility: HOSPITAL | Age: 65
Discharge: HOME | End: 2025-08-01
Payer: MEDICARE

## 2025-08-01 DIAGNOSIS — M48.061 SPINAL STENOSIS, LUMBAR REGION, WITHOUT NEUROGENIC CLAUDICATION: ICD-10-CM

## 2025-08-01 PROCEDURE — 72148 MRI LUMBAR SPINE W/O DYE: CPT | Mod: TC

## 2025-08-01 PROCEDURE — 72148 MRI LUMBAR SPINE W/O DYE: CPT | Mod: 26,,, | Performed by: RADIOLOGY

## (undated) DEVICE — SUT 0 VICRYL / UR6 (J603)

## (undated) DEVICE — NDL SPINAL 22GX5

## (undated) DEVICE — GLOVE PI ULTRA TOUCH G SURGEON

## (undated) DEVICE — SUT CTD VICRYL 4-0 P-3 18IN

## (undated) DEVICE — FORCEP BIOSY RJ 4 HOT 2.2X240

## (undated) DEVICE — SYR 10CC LUER LOCK

## (undated) DEVICE — KIT ENDO CARRY-ON PROC 100310

## (undated) DEVICE — STRAP OR TABLE 5IN X 72IN

## (undated) DEVICE — BAG TISS RETRV MONARCH 10MM

## (undated) DEVICE — KIT NERVE BLOCK PREP BAPTIST

## (undated) DEVICE — NDL TUOHY EPIDURAL 20G X 3.5

## (undated) DEVICE — TROCAR KII BLLN 12MM 10CM

## (undated) DEVICE — SUT CTD VICRYL 4-0 P-2

## (undated) DEVICE — APPLICATOR CHLORAPREP CLR 10.5

## (undated) DEVICE — GLOVE SURG ULTRA TOUCH 7.5

## (undated) DEVICE — CONTAINER SPECIMEN STRL 4OZ

## (undated) DEVICE — MARKER SKIN RULER AND LABEL

## (undated) DEVICE — NDL SPINAL 22GA 3.5 IN QUINCKE

## (undated) DEVICE — PAD PREPS ALCOHOL 2-PLY LARGE

## (undated) DEVICE — SLEEVE SCD EXPRESS CALF MEDIUM

## (undated) DEVICE — MARKER SKIN STND TIP BLUE BARR

## (undated) DEVICE — CHLORAPREP 3ML APPLICATOR TINT

## (undated) DEVICE — SYR SLIP TIP 5CC

## (undated) DEVICE — SUT 3-0 VICRYL / SH (J416)

## (undated) DEVICE — SYR LUER SLIP GLASS 5ML

## (undated) DEVICE — TUBING INSUFFLATION HEATED

## (undated) DEVICE — SCISSOR TIP ENDOCUT DISPOSABLE

## (undated) DEVICE — BANDAID GARFIELD

## (undated) DEVICE — KIT DEFENDO VLV  AIR WATER SUC

## (undated) DEVICE — SOL WATER STRL IRR 1000ML

## (undated) DEVICE — CANISTER SUCTION 3000CC

## (undated) DEVICE — GAUZE SPONGE 4X4 12PLY

## (undated) DEVICE — GLOVE SENSICARE PI SURG 7.5

## (undated) DEVICE — TROCAR ENDO Z THREAD KII 5X100

## (undated) DEVICE — BITE BLOCK ADULT LATEX FREE

## (undated) DEVICE — ADHESIVE DERMABOND ADVANCED

## (undated) DEVICE — GLOVE SURGEONS ULTRA TOUCH 6.5

## (undated) DEVICE — SEE MEDLINE ITEM 156964

## (undated) DEVICE — PAD ELECTROSURGICAL SPL W/CORD

## (undated) DEVICE — DRESSING LEUKOPLAST FLEX 1X3IN

## (undated) DEVICE — SEE MEDLINE ITEM 146416

## (undated) DEVICE — SOL CLEARIFY VISUALIZATION LAP

## (undated) DEVICE — SHEET DRAPE FAN-FOLDED 3/4

## (undated) DEVICE — CANNULA LAP SEAL Z THRD 5X100

## (undated) DEVICE — DRAPE ABDOMINAL TIBURON 14X11

## (undated) DEVICE — BANDAGE ADHESIVE

## (undated) DEVICE — Device

## (undated) DEVICE — ELECTRODE REM PLYHSV RETURN 9

## (undated) DEVICE — SOL NACL IRR 3000ML

## (undated) DEVICE — MARKER SKIN RULER FINE TIP

## (undated) DEVICE — SNARE CAPTIVATOR II 25MM ROUND

## (undated) DEVICE — IRRIGATOR SUCTION W/TIP

## (undated) DEVICE — COUNTER BDL BLADEGUARD LI DBL

## (undated) DEVICE — FILTER LAPAROSCOPIC SMOKE EVAC

## (undated) DEVICE — APPLIER CLIP ENDO LIGAMAX 5MM